# Patient Record
Sex: FEMALE | Race: WHITE | NOT HISPANIC OR LATINO | URBAN - METROPOLITAN AREA
[De-identification: names, ages, dates, MRNs, and addresses within clinical notes are randomized per-mention and may not be internally consistent; named-entity substitution may affect disease eponyms.]

---

## 2017-01-03 ENCOUNTER — INPATIENT (INPATIENT)
Facility: HOSPITAL | Age: 27
LOS: 1 days | Discharge: HOME | DRG: 897 | End: 2017-01-05
Attending: HOSPITALIST | Admitting: INTERNAL MEDICINE
Payer: COMMERCIAL

## 2017-01-03 VITALS
SYSTOLIC BLOOD PRESSURE: 156 MMHG | OXYGEN SATURATION: 100 % | HEIGHT: 67 IN | WEIGHT: 139.99 LBS | RESPIRATION RATE: 18 BRPM | DIASTOLIC BLOOD PRESSURE: 98 MMHG | TEMPERATURE: 98 F | HEART RATE: 111 BPM

## 2017-01-03 DIAGNOSIS — F10.239 ALCOHOL DEPENDENCE WITH WITHDRAWAL, UNSPECIFIED: ICD-10-CM

## 2017-01-03 LAB
ALBUMIN SERPL ELPH-MCNC: 4.8 G/DL — SIGNIFICANT CHANGE UP (ref 3.3–5)
ALP SERPL-CCNC: 87 U/L — SIGNIFICANT CHANGE UP (ref 40–120)
ALT FLD-CCNC: 88 U/L RC — HIGH (ref 10–45)
ANION GAP SERPL CALC-SCNC: 20 MMOL/L — HIGH (ref 5–17)
APPEARANCE UR: ABNORMAL
APTT BLD: 32.4 SEC — SIGNIFICANT CHANGE UP (ref 27.5–37.4)
AST SERPL-CCNC: 122 U/L — HIGH (ref 10–40)
BASOPHILS # BLD AUTO: 0 K/UL — SIGNIFICANT CHANGE UP (ref 0–0.2)
BASOPHILS NFR BLD AUTO: 0.7 % — SIGNIFICANT CHANGE UP (ref 0–2)
BILIRUB SERPL-MCNC: 0.5 MG/DL — SIGNIFICANT CHANGE UP (ref 0.2–1.2)
BILIRUB UR-MCNC: NEGATIVE — SIGNIFICANT CHANGE UP
BUN SERPL-MCNC: 7 MG/DL — SIGNIFICANT CHANGE UP (ref 7–23)
CALCIUM SERPL-MCNC: 9.6 MG/DL — SIGNIFICANT CHANGE UP (ref 8.4–10.5)
CHLORIDE SERPL-SCNC: 99 MMOL/L — SIGNIFICANT CHANGE UP (ref 96–108)
CO2 SERPL-SCNC: 21 MMOL/L — LOW (ref 22–31)
COLOR SPEC: YELLOW — SIGNIFICANT CHANGE UP
CREAT SERPL-MCNC: 0.67 MG/DL — SIGNIFICANT CHANGE UP (ref 0.5–1.3)
DIFF PNL FLD: ABNORMAL
EOSINOPHIL # BLD AUTO: 0.3 K/UL — SIGNIFICANT CHANGE UP (ref 0–0.5)
EOSINOPHIL NFR BLD AUTO: 4.7 % — SIGNIFICANT CHANGE UP (ref 0–6)
EPI CELLS # UR: SIGNIFICANT CHANGE UP /HPF
GAS PNL BLDV: SIGNIFICANT CHANGE UP
GLUCOSE SERPL-MCNC: 82 MG/DL — SIGNIFICANT CHANGE UP (ref 70–99)
GLUCOSE UR QL: NEGATIVE — SIGNIFICANT CHANGE UP
HCG UR QL: NEGATIVE — SIGNIFICANT CHANGE UP
HCT VFR BLD CALC: 38.2 % — SIGNIFICANT CHANGE UP (ref 34.5–45)
HGB BLD-MCNC: 13.4 G/DL — SIGNIFICANT CHANGE UP (ref 11.5–15.5)
INR BLD: 0.99 RATIO — SIGNIFICANT CHANGE UP (ref 0.88–1.16)
KETONES UR-MCNC: ABNORMAL
LEUKOCYTE ESTERASE UR-ACNC: NEGATIVE — SIGNIFICANT CHANGE UP
LYMPHOCYTES # BLD AUTO: 1 K/UL — SIGNIFICANT CHANGE UP (ref 1–3.3)
LYMPHOCYTES # BLD AUTO: 14.3 % — SIGNIFICANT CHANGE UP (ref 13–44)
MCHC RBC-ENTMCNC: 34.5 PG — HIGH (ref 27–34)
MCHC RBC-ENTMCNC: 35.1 GM/DL — SIGNIFICANT CHANGE UP (ref 32–36)
MCV RBC AUTO: 98.2 FL — SIGNIFICANT CHANGE UP (ref 80–100)
MONOCYTES # BLD AUTO: 0.7 K/UL — SIGNIFICANT CHANGE UP (ref 0–0.9)
MONOCYTES NFR BLD AUTO: 10.2 % — SIGNIFICANT CHANGE UP (ref 2–14)
NEUTROPHILS # BLD AUTO: 4.7 K/UL — SIGNIFICANT CHANGE UP (ref 1.8–7.4)
NEUTROPHILS NFR BLD AUTO: 70.1 % — SIGNIFICANT CHANGE UP (ref 43–77)
NITRITE UR-MCNC: NEGATIVE — SIGNIFICANT CHANGE UP
PH UR: 7 — SIGNIFICANT CHANGE UP (ref 4.8–8)
PLATELET # BLD AUTO: 272 K/UL — SIGNIFICANT CHANGE UP (ref 150–400)
POTASSIUM SERPL-MCNC: 3.8 MMOL/L — SIGNIFICANT CHANGE UP (ref 3.5–5.3)
POTASSIUM SERPL-SCNC: 3.8 MMOL/L — SIGNIFICANT CHANGE UP (ref 3.5–5.3)
PROT SERPL-MCNC: 7.6 G/DL — SIGNIFICANT CHANGE UP (ref 6–8.3)
PROT UR-MCNC: 30 MG/DL
PROTHROM AB SERPL-ACNC: 10.7 SEC — SIGNIFICANT CHANGE UP (ref 10–13.1)
RBC # BLD: 3.89 M/UL — SIGNIFICANT CHANGE UP (ref 3.8–5.2)
RBC # FLD: 11.5 % — SIGNIFICANT CHANGE UP (ref 10.3–14.5)
RBC CASTS # UR COMP ASSIST: ABNORMAL /HPF (ref 0–2)
SODIUM SERPL-SCNC: 140 MMOL/L — SIGNIFICANT CHANGE UP (ref 135–145)
SP GR SPEC: >1.03 — HIGH (ref 1.01–1.02)
UROBILINOGEN FLD QL: NEGATIVE — SIGNIFICANT CHANGE UP
WBC # BLD: 6.7 K/UL — SIGNIFICANT CHANGE UP (ref 3.8–10.5)
WBC # FLD AUTO: 6.7 K/UL — SIGNIFICANT CHANGE UP (ref 3.8–10.5)
WBC UR QL: SIGNIFICANT CHANGE UP /HPF (ref 0–5)

## 2017-01-03 PROCEDURE — 99223 1ST HOSP IP/OBS HIGH 75: CPT

## 2017-01-03 PROCEDURE — 93010 ELECTROCARDIOGRAM REPORT: CPT

## 2017-01-03 PROCEDURE — 70450 CT HEAD/BRAIN W/O DYE: CPT | Mod: 26

## 2017-01-03 PROCEDURE — 99285 EMERGENCY DEPT VISIT HI MDM: CPT | Mod: 25

## 2017-01-03 PROCEDURE — 70487 CT MAXILLOFACIAL W/DYE: CPT | Mod: 26

## 2017-01-03 RX ORDER — SODIUM CHLORIDE 9 MG/ML
2000 INJECTION INTRAMUSCULAR; INTRAVENOUS; SUBCUTANEOUS ONCE
Qty: 0 | Refills: 0 | Status: COMPLETED | OUTPATIENT
Start: 2017-01-03 | End: 2017-01-03

## 2017-01-03 RX ADMIN — SODIUM CHLORIDE 1000 MILLILITER(S): 9 INJECTION INTRAMUSCULAR; INTRAVENOUS; SUBCUTANEOUS at 18:41

## 2017-01-03 RX ADMIN — Medication 50 MILLIGRAM(S): at 22:38

## 2017-01-03 RX ADMIN — Medication 1 MILLIGRAM(S): at 18:40
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
## 2017-01-03 NOTE — ED PROVIDER NOTE - HEAD, MLM
Bilaterally periorbital ecchymosis with mild maxillary TTP. Head shape is symmetrical. Bilaterally periorbital ecchymosis with mild maxillary TTP. No facial crepitus or obvious deformity. Head shape is symmetrical.

## 2017-01-03 NOTE — ED PROVIDER NOTE - NEUROLOGICAL, MLM
Alert and oriented, no focal deficits, no motor or sensory deficits. + Resting tremors with hands extended.

## 2017-01-03 NOTE — ED PROVIDER NOTE - PROGRESS NOTE DETAILS
Patient with DECLAN, will admit to prevent seizures, CT Head / Neck unremarkable to need interventions.  - Tab Castillo MD

## 2017-01-03 NOTE — ED PROVIDER NOTE - INTERPRETATION
normal sinus rhythm, Normal axis, Normal NM interval and QRS complex. There are no acute ischemic ST or T-wave changes.

## 2017-01-03 NOTE — ED PROVIDER NOTE - PSYCHIATRIC, MLM
Alert and oriented to person, place, time/situation. Mildly anxious. no apparent risk to self or others.
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
## 2017-01-03 NOTE — ED PROVIDER NOTE - SKIN, MLM
Bilaterally periorbital ecchymosis, otherwise Skin normal color for race, warm, dry and intact. No evidence of rash.

## 2017-01-04 DIAGNOSIS — F32.9 MAJOR DEPRESSIVE DISORDER, SINGLE EPISODE, UNSPECIFIED: ICD-10-CM

## 2017-01-04 DIAGNOSIS — E87.2 ACIDOSIS: ICD-10-CM

## 2017-01-04 DIAGNOSIS — Z29.9 ENCOUNTER FOR PROPHYLACTIC MEASURES, UNSPECIFIED: ICD-10-CM

## 2017-01-04 DIAGNOSIS — R63.8 OTHER SYMPTOMS AND SIGNS CONCERNING FOOD AND FLUID INTAKE: ICD-10-CM

## 2017-01-04 DIAGNOSIS — F10.239 ALCOHOL DEPENDENCE WITH WITHDRAWAL, UNSPECIFIED: ICD-10-CM

## 2017-01-04 DIAGNOSIS — F19.10 OTHER PSYCHOACTIVE SUBSTANCE ABUSE, UNCOMPLICATED: ICD-10-CM

## 2017-01-04 DIAGNOSIS — Z72.0 TOBACCO USE: ICD-10-CM

## 2017-01-04 LAB
ANION GAP SERPL CALC-SCNC: 12 MMOL/L — SIGNIFICANT CHANGE UP (ref 5–17)
APPEARANCE UR: CLEAR — SIGNIFICANT CHANGE UP
BILIRUB UR-MCNC: NEGATIVE — SIGNIFICANT CHANGE UP
BUN SERPL-MCNC: 5 MG/DL — LOW (ref 7–23)
CALCIUM SERPL-MCNC: 8.1 MG/DL — LOW (ref 8.4–10.5)
CHLORIDE SERPL-SCNC: 104 MMOL/L — SIGNIFICANT CHANGE UP (ref 96–108)
CO2 SERPL-SCNC: 22 MMOL/L — SIGNIFICANT CHANGE UP (ref 22–31)
COLOR SPEC: YELLOW — SIGNIFICANT CHANGE UP
CREAT SERPL-MCNC: 0.53 MG/DL — SIGNIFICANT CHANGE UP (ref 0.5–1.3)
DIFF PNL FLD: ABNORMAL
ETHANOL SERPL-MCNC: SIGNIFICANT CHANGE UP MG/DL (ref 0–10)
FOLATE SERPL-MCNC: 19.2 NG/ML — SIGNIFICANT CHANGE UP (ref 4.8–24.2)
GLUCOSE SERPL-MCNC: 82 MG/DL — SIGNIFICANT CHANGE UP (ref 70–99)
GLUCOSE UR QL: NEGATIVE MG/DL — SIGNIFICANT CHANGE UP
HCT VFR BLD CALC: 39.9 % — SIGNIFICANT CHANGE UP (ref 34.5–45)
HGB BLD-MCNC: 13.1 G/DL — SIGNIFICANT CHANGE UP (ref 11.5–15.5)
KETONES UR-MCNC: ABNORMAL
LEUKOCYTE ESTERASE UR-ACNC: NEGATIVE — SIGNIFICANT CHANGE UP
MAGNESIUM SERPL-MCNC: 1.7 MG/DL — SIGNIFICANT CHANGE UP (ref 1.6–2.6)
MCHC RBC-ENTMCNC: 32.5 PG — SIGNIFICANT CHANGE UP (ref 27–34)
MCHC RBC-ENTMCNC: 32.8 GM/DL — SIGNIFICANT CHANGE UP (ref 32–36)
MCV RBC AUTO: 99 FL — SIGNIFICANT CHANGE UP (ref 80–100)
NITRITE UR-MCNC: NEGATIVE — SIGNIFICANT CHANGE UP
PH UR: 6.5 — SIGNIFICANT CHANGE UP (ref 5–8)
PHOSPHATE SERPL-MCNC: 2.1 MG/DL — LOW (ref 2.5–4.5)
PLATELET # BLD AUTO: 264 K/UL — SIGNIFICANT CHANGE UP (ref 150–400)
POTASSIUM SERPL-MCNC: 3.8 MMOL/L — SIGNIFICANT CHANGE UP (ref 3.5–5.3)
POTASSIUM SERPL-SCNC: 3.8 MMOL/L — SIGNIFICANT CHANGE UP (ref 3.5–5.3)
PROT UR-MCNC: NEGATIVE MG/DL — SIGNIFICANT CHANGE UP
RBC # BLD: 4.03 M/UL — SIGNIFICANT CHANGE UP (ref 3.8–5.2)
RBC # FLD: 11.8 % — SIGNIFICANT CHANGE UP (ref 10.3–14.5)
SODIUM SERPL-SCNC: 138 MMOL/L — SIGNIFICANT CHANGE UP (ref 135–145)
SP GR SPEC: 1.01 — LOW (ref 1.01–1.02)
T PALLIDUM AB TITR SER: NEGATIVE — SIGNIFICANT CHANGE UP
TSH SERPL-MCNC: 1.22 UU/ML — SIGNIFICANT CHANGE UP (ref 0.27–4.2)
UROBILINOGEN FLD QL: NEGATIVE MG/DL — SIGNIFICANT CHANGE UP
VIT B12 SERPL-MCNC: 606 PG/ML — SIGNIFICANT CHANGE UP (ref 243–894)
WBC # BLD: 6 K/UL — SIGNIFICANT CHANGE UP (ref 3.8–10.5)
WBC # FLD AUTO: 6 K/UL — SIGNIFICANT CHANGE UP (ref 3.8–10.5)

## 2017-01-04 PROCEDURE — 99233 SBSQ HOSP IP/OBS HIGH 50: CPT

## 2017-01-04 PROCEDURE — 99222 1ST HOSP IP/OBS MODERATE 55: CPT | Mod: GC

## 2017-01-04 RX ORDER — SODIUM CHLORIDE 9 MG/ML
1000 INJECTION INTRAMUSCULAR; INTRAVENOUS; SUBCUTANEOUS
Qty: 0 | Refills: 0 | Status: DISCONTINUED | OUTPATIENT
Start: 2017-01-04 | End: 2017-01-05

## 2017-01-04 RX ORDER — TRAZODONE HCL 50 MG
50 TABLET ORAL AT BEDTIME
Qty: 0 | Refills: 0 | Status: DISCONTINUED | OUTPATIENT
Start: 2017-01-04 | End: 2017-01-05

## 2017-01-04 RX ORDER — HEPARIN SODIUM 5000 [USP'U]/ML
5000 INJECTION INTRAVENOUS; SUBCUTANEOUS EVERY 12 HOURS
Qty: 0 | Refills: 0 | Status: DISCONTINUED | OUTPATIENT
Start: 2017-01-04 | End: 2017-01-05

## 2017-01-04 RX ORDER — SODIUM,POTASSIUM PHOSPHATES 278-250MG
1 POWDER IN PACKET (EA) ORAL
Qty: 0 | Refills: 0 | Status: DISCONTINUED | OUTPATIENT
Start: 2017-01-04 | End: 2017-01-05

## 2017-01-04 RX ORDER — FOLIC ACID 0.8 MG
1 TABLET ORAL DAILY
Qty: 0 | Refills: 0 | Status: DISCONTINUED | OUTPATIENT
Start: 2017-01-04 | End: 2017-01-05

## 2017-01-04 RX ORDER — NICOTINE POLACRILEX 2 MG
1 GUM BUCCAL DAILY
Qty: 0 | Refills: 0 | Status: DISCONTINUED | OUTPATIENT
Start: 2017-01-04 | End: 2017-01-05

## 2017-01-04 RX ORDER — SERTRALINE 25 MG/1
50 TABLET, FILM COATED ORAL DAILY
Qty: 0 | Refills: 0 | Status: DISCONTINUED | OUTPATIENT
Start: 2017-01-04 | End: 2017-01-05

## 2017-01-04 RX ORDER — THIAMINE MONONITRATE (VIT B1) 100 MG
100 TABLET ORAL DAILY
Qty: 0 | Refills: 0 | Status: DISCONTINUED | OUTPATIENT
Start: 2017-01-04 | End: 2017-01-05

## 2017-01-04 RX ADMIN — SODIUM CHLORIDE 150 MILLILITER(S): 9 INJECTION INTRAMUSCULAR; INTRAVENOUS; SUBCUTANEOUS at 12:43

## 2017-01-04 RX ADMIN — Medication 1 TABLET(S): at 11:57

## 2017-01-04 RX ADMIN — Medication 1 PATCH: at 11:58

## 2017-01-04 RX ADMIN — Medication 1 TABLET(S): at 21:30

## 2017-01-04 RX ADMIN — Medication 1 TABLET(S): at 11:58

## 2017-01-04 RX ADMIN — SERTRALINE 50 MILLIGRAM(S): 25 TABLET, FILM COATED ORAL at 12:02

## 2017-01-04 RX ADMIN — Medication 1 TABLET(S): at 19:15

## 2017-01-04 RX ADMIN — Medication 1 MILLIGRAM(S): at 11:57

## 2017-01-04 RX ADMIN — SODIUM CHLORIDE 150 MILLILITER(S): 9 INJECTION INTRAMUSCULAR; INTRAVENOUS; SUBCUTANEOUS at 04:11

## 2017-01-04 RX ADMIN — Medication 100 MILLIGRAM(S): at 11:57

## 2017-01-04 RX ADMIN — Medication 50 MILLIGRAM(S): at 21:30

## 2017-01-04 RX ADMIN — Medication 1 MILLIGRAM(S): at 21:20

## 2017-01-04 NOTE — ED ADULT NURSE REASSESSMENT NOTE - TREMOR
4=moderate with patient's arms extended

## 2017-01-04 NOTE — ED ADULT NURSE REASSESSMENT NOTE - NAUSEA/VOMITING
0=no nausea with no vomiting

## 2017-01-04 NOTE — ED ADULT NURSE REASSESSMENT NOTE - ORIENTATION
0=oriented and can do serial additions

## 2017-01-04 NOTE — H&P ADULT. - ASSESSMENT
This is a 26 year old woman with history of depression, anxiety, EtOH abuse and polysubstance abuse (cocaine, benzos) presenting with EtOH withdrawal symptoms
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
## 2017-01-04 NOTE — H&P ADULT. - PROBLEM SELECTOR PLAN 1
--initiate low risk CIWA protocol with symptoms triggered IV ativan  --currently 72 hours since last use, nearing end of window for concern  --patient endorses a desire to enter rehab, though currently prefers to go home first and arrange rehab from there. Addressed benefits of going straight to rehab from hospital  --continue IV hydration  --SW c/s

## 2017-01-04 NOTE — ED ADULT NURSE REASSESSMENT NOTE - NS ED NURSE REASSESS COMMENT FT1
Pts VS stable and she is in no acute distress. Pt denies pain just reports trouble sleeping in the halls, she was given warm blankets and a pillow
Pts VS stable and she is in no acute distress. Pt is resting comfortably
Pts VS stable and she is in no acute distress, Pt given food and she is tolerating PO

## 2017-01-04 NOTE — H&P ADULT. - FAMILY HISTORY
Mother  Still living? Unknown  Family history of MS (multiple sclerosis), Age at diagnosis: Age Unknown     Sibling  Still living? Unknown  Family history of MS (multiple sclerosis), Age at diagnosis: Age Unknown

## 2017-01-05 ENCOUNTER — TRANSCRIPTION ENCOUNTER (OUTPATIENT)
Age: 27
End: 2017-01-05

## 2017-01-05 VITALS
HEART RATE: 76 BPM | TEMPERATURE: 99 F | OXYGEN SATURATION: 98 % | SYSTOLIC BLOOD PRESSURE: 126 MMHG | DIASTOLIC BLOOD PRESSURE: 88 MMHG | RESPIRATION RATE: 16 BRPM

## 2017-01-05 LAB
ALBUMIN SERPL ELPH-MCNC: 3.5 G/DL — SIGNIFICANT CHANGE UP (ref 3.3–5)
ALP SERPL-CCNC: 61 U/L — SIGNIFICANT CHANGE UP (ref 40–120)
ALT FLD-CCNC: 70 U/L — HIGH (ref 10–45)
ANION GAP SERPL CALC-SCNC: 14 MMOL/L — SIGNIFICANT CHANGE UP (ref 5–17)
AST SERPL-CCNC: 87 U/L — HIGH (ref 10–40)
BILIRUB SERPL-MCNC: 0.4 MG/DL — SIGNIFICANT CHANGE UP (ref 0.2–1.2)
BUN SERPL-MCNC: 4 MG/DL — LOW (ref 7–23)
CALCIUM SERPL-MCNC: 8.2 MG/DL — LOW (ref 8.4–10.5)
CHLORIDE SERPL-SCNC: 105 MMOL/L — SIGNIFICANT CHANGE UP (ref 96–108)
CO2 SERPL-SCNC: 19 MMOL/L — LOW (ref 22–31)
CREAT SERPL-MCNC: 0.48 MG/DL — LOW (ref 0.5–1.3)
CULTURE RESULTS: SIGNIFICANT CHANGE UP
GLUCOSE SERPL-MCNC: 80 MG/DL — SIGNIFICANT CHANGE UP (ref 70–99)
HCT VFR BLD CALC: 34.6 % — SIGNIFICANT CHANGE UP (ref 34.5–45)
HGB BLD-MCNC: 11.2 G/DL — LOW (ref 11.5–15.5)
MAGNESIUM SERPL-MCNC: 1.6 MG/DL — SIGNIFICANT CHANGE UP (ref 1.6–2.6)
MCHC RBC-ENTMCNC: 31.8 PG — SIGNIFICANT CHANGE UP (ref 27–34)
MCHC RBC-ENTMCNC: 32.4 GM/DL — SIGNIFICANT CHANGE UP (ref 32–36)
MCV RBC AUTO: 98.3 FL — SIGNIFICANT CHANGE UP (ref 80–100)
PHOSPHATE SERPL-MCNC: 3.2 MG/DL — SIGNIFICANT CHANGE UP (ref 2.5–4.5)
PLATELET # BLD AUTO: 246 K/UL — SIGNIFICANT CHANGE UP (ref 150–400)
POTASSIUM SERPL-MCNC: 3.5 MMOL/L — SIGNIFICANT CHANGE UP (ref 3.5–5.3)
POTASSIUM SERPL-SCNC: 3.5 MMOL/L — SIGNIFICANT CHANGE UP (ref 3.5–5.3)
PROT SERPL-MCNC: 5.7 G/DL — LOW (ref 6–8.3)
RBC # BLD: 3.52 M/UL — LOW (ref 3.8–5.2)
RBC # FLD: 12.8 % — SIGNIFICANT CHANGE UP (ref 10.3–14.5)
SODIUM SERPL-SCNC: 138 MMOL/L — SIGNIFICANT CHANGE UP (ref 135–145)
SPECIMEN SOURCE: SIGNIFICANT CHANGE UP
WBC # BLD: 6.58 K/UL — SIGNIFICANT CHANGE UP (ref 3.8–10.5)
WBC # FLD AUTO: 6.58 K/UL — SIGNIFICANT CHANGE UP (ref 3.8–10.5)

## 2017-01-05 PROCEDURE — 83605 ASSAY OF LACTIC ACID: CPT

## 2017-01-05 PROCEDURE — 81025 URINE PREGNANCY TEST: CPT

## 2017-01-05 PROCEDURE — 85730 THROMBOPLASTIN TIME PARTIAL: CPT

## 2017-01-05 PROCEDURE — 80053 COMPREHEN METABOLIC PANEL: CPT

## 2017-01-05 PROCEDURE — 86780 TREPONEMA PALLIDUM: CPT

## 2017-01-05 PROCEDURE — 84132 ASSAY OF SERUM POTASSIUM: CPT

## 2017-01-05 PROCEDURE — 83735 ASSAY OF MAGNESIUM: CPT

## 2017-01-05 PROCEDURE — 70487 CT MAXILLOFACIAL W/DYE: CPT

## 2017-01-05 PROCEDURE — 99239 HOSP IP/OBS DSCHRG MGMT >30: CPT

## 2017-01-05 PROCEDURE — 96374 THER/PROPH/DIAG INJ IV PUSH: CPT | Mod: XU

## 2017-01-05 PROCEDURE — 85610 PROTHROMBIN TIME: CPT

## 2017-01-05 PROCEDURE — 99285 EMERGENCY DEPT VISIT HI MDM: CPT | Mod: 25

## 2017-01-05 PROCEDURE — 82607 VITAMIN B-12: CPT

## 2017-01-05 PROCEDURE — 70450 CT HEAD/BRAIN W/O DYE: CPT

## 2017-01-05 PROCEDURE — 84100 ASSAY OF PHOSPHORUS: CPT

## 2017-01-05 PROCEDURE — 81001 URINALYSIS AUTO W/SCOPE: CPT

## 2017-01-05 PROCEDURE — 99232 SBSQ HOSP IP/OBS MODERATE 35: CPT | Mod: GC

## 2017-01-05 PROCEDURE — 80307 DRUG TEST PRSMV CHEM ANLYZR: CPT

## 2017-01-05 PROCEDURE — 82803 BLOOD GASES ANY COMBINATION: CPT

## 2017-01-05 PROCEDURE — 82947 ASSAY GLUCOSE BLOOD QUANT: CPT

## 2017-01-05 PROCEDURE — 90686 IIV4 VACC NO PRSV 0.5 ML IM: CPT

## 2017-01-05 PROCEDURE — 82330 ASSAY OF CALCIUM: CPT

## 2017-01-05 PROCEDURE — 87086 URINE CULTURE/COLONY COUNT: CPT

## 2017-01-05 PROCEDURE — 85014 HEMATOCRIT: CPT

## 2017-01-05 PROCEDURE — 84295 ASSAY OF SERUM SODIUM: CPT

## 2017-01-05 PROCEDURE — 82746 ASSAY OF FOLIC ACID SERUM: CPT

## 2017-01-05 PROCEDURE — 93005 ELECTROCARDIOGRAM TRACING: CPT

## 2017-01-05 PROCEDURE — 82435 ASSAY OF BLOOD CHLORIDE: CPT

## 2017-01-05 PROCEDURE — 85027 COMPLETE CBC AUTOMATED: CPT

## 2017-01-05 PROCEDURE — 84443 ASSAY THYROID STIM HORMONE: CPT

## 2017-01-05 PROCEDURE — 80048 BASIC METABOLIC PNL TOTAL CA: CPT

## 2017-01-05 RX ORDER — FOLIC ACID 0.8 MG
1 TABLET ORAL
Qty: 0 | Refills: 0 | COMMUNITY
Start: 2017-01-05

## 2017-01-05 RX ORDER — TRAZODONE HCL 50 MG
1 TABLET ORAL
Qty: 30 | Refills: 0 | OUTPATIENT
Start: 2017-01-05 | End: 2017-02-04

## 2017-01-05 RX ORDER — MAGNESIUM SULFATE 500 MG/ML
2 VIAL (ML) INJECTION ONCE
Qty: 0 | Refills: 0 | Status: COMPLETED | OUTPATIENT
Start: 2017-01-05 | End: 2017-01-05

## 2017-01-05 RX ORDER — NICOTINE POLACRILEX 2 MG
1 GUM BUCCAL
Qty: 30 | Refills: 0 | OUTPATIENT
Start: 2017-01-05 | End: 2017-02-04

## 2017-01-05 RX ORDER — THIAMINE MONONITRATE (VIT B1) 100 MG
1 TABLET ORAL
Qty: 0 | Refills: 0 | COMMUNITY
Start: 2017-01-05

## 2017-01-05 RX ORDER — INFLUENZA VIRUS VACCINE 15; 15; 15; 15 UG/.5ML; UG/.5ML; UG/.5ML; UG/.5ML
0.5 SUSPENSION INTRAMUSCULAR ONCE
Qty: 0 | Refills: 0 | Status: COMPLETED | OUTPATIENT
Start: 2017-01-05 | End: 2017-01-05

## 2017-01-05 RX ADMIN — SODIUM CHLORIDE 150 MILLILITER(S): 9 INJECTION INTRAMUSCULAR; INTRAVENOUS; SUBCUTANEOUS at 06:57

## 2017-01-05 RX ADMIN — Medication 1 PATCH: at 12:17

## 2017-01-05 RX ADMIN — SODIUM CHLORIDE 150 MILLILITER(S): 9 INJECTION INTRAMUSCULAR; INTRAVENOUS; SUBCUTANEOUS at 08:03

## 2017-01-05 RX ADMIN — SERTRALINE 50 MILLIGRAM(S): 25 TABLET, FILM COATED ORAL at 13:52

## 2017-01-05 RX ADMIN — Medication 100 MILLIGRAM(S): at 12:12

## 2017-01-05 RX ADMIN — Medication 1 PATCH: at 12:14

## 2017-01-05 RX ADMIN — Medication 1 TABLET(S): at 16:35

## 2017-01-05 RX ADMIN — HEPARIN SODIUM 5000 UNIT(S): 5000 INJECTION INTRAVENOUS; SUBCUTANEOUS at 06:12

## 2017-01-05 RX ADMIN — INFLUENZA VIRUS VACCINE 0.5 MILLILITER(S): 15; 15; 15; 15 SUSPENSION INTRAMUSCULAR at 16:32

## 2017-01-05 RX ADMIN — Medication 1 MILLIGRAM(S): at 12:12

## 2017-01-05 RX ADMIN — Medication 1 TABLET(S): at 12:12

## 2017-01-05 RX ADMIN — Medication 1 TABLET(S): at 12:11

## 2017-01-05 RX ADMIN — Medication 1 TABLET(S): at 09:03

## 2017-01-05 RX ADMIN — Medication 50 GRAM(S): at 13:52

## 2017-01-05 NOTE — DISCHARGE NOTE ADULT - PLAN OF CARE
symptoms are resolving You were seen by social work and given a handout with inpatient and outpatient rehabilitation facilities.  Please make an attempt to call and set up an appointment with any of the appropriate facilities of your choice. Follow up with outpatient psychiatry clinic and outpatient medicine clinic within a week of being discharged from the hospital.   SEEK IMMEDIATE CARE IF  You have thoughts about hurting yourself or others.  You lose touch with reality (have psychotic symptoms).  You are taking medicine for depression and have a serious side effect
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
## 2017-01-05 NOTE — DISCHARGE NOTE ADULT - MEDICATION SUMMARY - MEDICATIONS TO TAKE
I will START or STAY ON the medications listed below when I get home from the hospital:    traZODone 50 mg oral tablet  -- 1 tab(s) by mouth once a day (at bedtime)  -- Indication: For Depression    Zoloft 50 mg oral tablet  -- 1 tab(s) by mouth once a day  -- Indication: For Depression    nicotine 7 mg/24 hr transdermal film, extended release  -- 1 patch by transdermal patch once a day  -- Indication: For smoking cessation     Multiple Vitamins oral tablet  -- 1 tab(s) by mouth once a day  -- Indication: For supplement     folic acid 1 mg oral tablet  -- 1 tab(s) by mouth once a day  -- Indication: For  Alcohol withdrawal supplement    thiamine 100 mg oral tablet  -- 1 tab(s) by mouth once a day  -- Indication: For Alcohol withdrawal supplement     Vitamin D3 2000 intl units oral tablet  -- 1 tab(s) by mouth once a day  -- Indication: For supplement

## 2017-01-05 NOTE — DISCHARGE NOTE ADULT - CARE PLAN
Principal Discharge DX:	Alcohol withdrawal syndrome, with unspecified complication  Goal:	symptoms are resolving  Instructions for follow-up, activity and diet:	You were seen by social work and given a handout with inpatient and outpatient rehabilitation facilities.  Please make an attempt to call and set up an appointment with any of the appropriate facilities of your choice.  Secondary Diagnosis:	Depression  Instructions for follow-up, activity and diet:	Follow up with outpatient psychiatry clinic and outpatient medicine clinic within a week of being discharged from the hospital.   SEEK IMMEDIATE CARE IF  You have thoughts about hurting yourself or others.  You lose touch with reality (have psychotic symptoms).  You are taking medicine for depression and have a serious side effect  Secondary Diagnosis:	Polysubstance abuse  Instructions for follow-up, activity and diet:	You were seen by social work and given a handout with inpatient and outpatient rehabilitation facilities.  Please make an attempt to call and set up an appointment with any of the appropriate facilities of your choice.

## 2017-01-05 NOTE — DISCHARGE NOTE ADULT - CARE PROVIDER_API CALL
Christiane Blake; MPH), Internal Medicine  [address] [place] [id number]  Phone: [phone]  Fax: [phone]    Abrahan Wilson (MD; [facility]), Psychiatry; Psychosomatic Medicine  [address] [place] [id number]  Phone: [phone]  Fax: [phone]

## 2017-01-05 NOTE — DISCHARGE NOTE ADULT - PATIENT PORTAL LINK FT
“You can access the FollowHealth Patient Portal, offered by [facility], by registering with the following website: [email or web address]”

## 2017-01-09 DIAGNOSIS — F41.8 OTHER SPECIFIED ANXIETY DISORDERS: ICD-10-CM

## 2017-01-09 DIAGNOSIS — F10.239 ALCOHOL DEPENDENCE WITH WITHDRAWAL, UNSPECIFIED: ICD-10-CM

## 2017-01-09 DIAGNOSIS — F19.10 OTHER PSYCHOACTIVE SUBSTANCE ABUSE, UNCOMPLICATED: ICD-10-CM

## 2017-03-03 ENCOUNTER — INPATIENT (INPATIENT)
Facility: HOSPITAL | Age: 27
LOS: 0 days | Discharge: HOME | DRG: 897 | End: 2017-03-04
Attending: INTERNAL MEDICINE | Admitting: INTERNAL MEDICINE
Payer: COMMERCIAL

## 2017-03-03 VITALS
RESPIRATION RATE: 22 BRPM | SYSTOLIC BLOOD PRESSURE: 143 MMHG | DIASTOLIC BLOOD PRESSURE: 107 MMHG | HEART RATE: 98 BPM | OXYGEN SATURATION: 99 % | TEMPERATURE: 99 F

## 2017-03-03 DIAGNOSIS — Z41.8 ENCOUNTER FOR OTHER PROCEDURES FOR PURPOSES OTHER THAN REMEDYING HEALTH STATE: ICD-10-CM

## 2017-03-03 DIAGNOSIS — F32.9 MAJOR DEPRESSIVE DISORDER, SINGLE EPISODE, UNSPECIFIED: ICD-10-CM

## 2017-03-03 DIAGNOSIS — F10.239 ALCOHOL DEPENDENCE WITH WITHDRAWAL, UNSPECIFIED: ICD-10-CM

## 2017-03-03 DIAGNOSIS — G47.00 INSOMNIA, UNSPECIFIED: ICD-10-CM

## 2017-03-03 PROBLEM — F41.9 ANXIETY DISORDER, UNSPECIFIED: Chronic | Status: ACTIVE | Noted: 2017-01-03

## 2017-03-03 LAB
ALBUMIN SERPL ELPH-MCNC: 4.8 G/DL — SIGNIFICANT CHANGE UP (ref 3.3–5)
ALP SERPL-CCNC: 81 U/L — SIGNIFICANT CHANGE UP (ref 40–120)
ALT FLD-CCNC: 108 U/L RC — HIGH (ref 10–45)
ANION GAP SERPL CALC-SCNC: 16 MMOL/L — SIGNIFICANT CHANGE UP (ref 5–17)
APPEARANCE UR: ABNORMAL
AST SERPL-CCNC: 148 U/L — HIGH (ref 10–40)
BASE EXCESS BLDV CALC-SCNC: 2.6 MMOL/L — HIGH (ref -2–2)
BASOPHILS # BLD AUTO: 0 K/UL — SIGNIFICANT CHANGE UP (ref 0–0.2)
BASOPHILS NFR BLD AUTO: 0.7 % — SIGNIFICANT CHANGE UP (ref 0–2)
BILIRUB SERPL-MCNC: 0.5 MG/DL — SIGNIFICANT CHANGE UP (ref 0.2–1.2)
BILIRUB UR-MCNC: NEGATIVE — SIGNIFICANT CHANGE UP
BUN SERPL-MCNC: 8 MG/DL — SIGNIFICANT CHANGE UP (ref 7–23)
CALCIUM SERPL-MCNC: 9.5 MG/DL — SIGNIFICANT CHANGE UP (ref 8.4–10.5)
CHLORIDE SERPL-SCNC: 97 MMOL/L — SIGNIFICANT CHANGE UP (ref 96–108)
CO2 BLDV-SCNC: 29 MMOL/L — SIGNIFICANT CHANGE UP (ref 22–30)
CO2 SERPL-SCNC: 24 MMOL/L — SIGNIFICANT CHANGE UP (ref 22–31)
COLOR SPEC: SIGNIFICANT CHANGE UP
CREAT SERPL-MCNC: 0.54 MG/DL — SIGNIFICANT CHANGE UP (ref 0.5–1.3)
DIFF PNL FLD: ABNORMAL
EOSINOPHIL # BLD AUTO: 0.1 K/UL — SIGNIFICANT CHANGE UP (ref 0–0.5)
EOSINOPHIL NFR BLD AUTO: 2.6 % — SIGNIFICANT CHANGE UP (ref 0–6)
ETHANOL SERPL-MCNC: 178 MG/DL — HIGH (ref 0–10)
GLUCOSE SERPL-MCNC: 86 MG/DL — SIGNIFICANT CHANGE UP (ref 70–99)
GLUCOSE UR QL: NEGATIVE — SIGNIFICANT CHANGE UP
HCO3 BLDV-SCNC: 28 MMOL/L — SIGNIFICANT CHANGE UP (ref 21–29)
HCT VFR BLD CALC: 38.8 % — SIGNIFICANT CHANGE UP (ref 34.5–45)
HGB BLD-MCNC: 13.4 G/DL — SIGNIFICANT CHANGE UP (ref 11.5–15.5)
KETONES UR-MCNC: NEGATIVE — SIGNIFICANT CHANGE UP
LEUKOCYTE ESTERASE UR-ACNC: NEGATIVE — SIGNIFICANT CHANGE UP
LYMPHOCYTES # BLD AUTO: 0.8 K/UL — LOW (ref 1–3.3)
LYMPHOCYTES # BLD AUTO: 16.6 % — SIGNIFICANT CHANGE UP (ref 13–44)
MCHC RBC-ENTMCNC: 33.1 PG — SIGNIFICANT CHANGE UP (ref 27–34)
MCHC RBC-ENTMCNC: 34.7 GM/DL — SIGNIFICANT CHANGE UP (ref 32–36)
MCV RBC AUTO: 95.6 FL — SIGNIFICANT CHANGE UP (ref 80–100)
MONOCYTES # BLD AUTO: 0.5 K/UL — SIGNIFICANT CHANGE UP (ref 0–0.9)
MONOCYTES NFR BLD AUTO: 9.3 % — SIGNIFICANT CHANGE UP (ref 2–14)
NEUTROPHILS # BLD AUTO: 3.5 K/UL — SIGNIFICANT CHANGE UP (ref 1.8–7.4)
NEUTROPHILS NFR BLD AUTO: 70.9 % — SIGNIFICANT CHANGE UP (ref 43–77)
NITRITE UR-MCNC: NEGATIVE — SIGNIFICANT CHANGE UP
PCO2 BLDV: 46 MMHG — SIGNIFICANT CHANGE UP (ref 35–50)
PH BLDV: 7.39 — SIGNIFICANT CHANGE UP (ref 7.35–7.45)
PH UR: 7 — SIGNIFICANT CHANGE UP (ref 4.8–8)
PLATELET # BLD AUTO: 225 K/UL — SIGNIFICANT CHANGE UP (ref 150–400)
PO2 BLDV: 30 MMHG — SIGNIFICANT CHANGE UP (ref 25–45)
POTASSIUM SERPL-MCNC: 5.2 MMOL/L — SIGNIFICANT CHANGE UP (ref 3.5–5.3)
POTASSIUM SERPL-SCNC: 5.2 MMOL/L — SIGNIFICANT CHANGE UP (ref 3.5–5.3)
PROT SERPL-MCNC: 7.8 G/DL — SIGNIFICANT CHANGE UP (ref 6–8.3)
PROT UR-MCNC: SIGNIFICANT CHANGE UP
RBC # BLD: 4.05 M/UL — SIGNIFICANT CHANGE UP (ref 3.8–5.2)
RBC # FLD: 12.2 % — SIGNIFICANT CHANGE UP (ref 10.3–14.5)
SALICYLATES SERPL-MCNC: <2 MG/DL — LOW (ref 15–30)
SAO2 % BLDV: 51 % — LOW (ref 67–88)
SODIUM SERPL-SCNC: 137 MMOL/L — SIGNIFICANT CHANGE UP (ref 135–145)
SP GR SPEC: 1.01 — LOW (ref 1.01–1.02)
UROBILINOGEN FLD QL: NEGATIVE — SIGNIFICANT CHANGE UP
WBC # BLD: 4.9 K/UL — SIGNIFICANT CHANGE UP (ref 3.8–10.5)
WBC # FLD AUTO: 4.9 K/UL — SIGNIFICANT CHANGE UP (ref 3.8–10.5)

## 2017-03-03 PROCEDURE — 99285 EMERGENCY DEPT VISIT HI MDM: CPT | Mod: 25

## 2017-03-03 PROCEDURE — 93010 ELECTROCARDIOGRAM REPORT: CPT

## 2017-03-03 PROCEDURE — 71020: CPT | Mod: 26

## 2017-03-03 PROCEDURE — 99223 1ST HOSP IP/OBS HIGH 75: CPT | Mod: GC

## 2017-03-03 RX ORDER — TRAZODONE HCL 50 MG
50 TABLET ORAL AT BEDTIME
Qty: 0 | Refills: 0 | Status: DISCONTINUED | OUTPATIENT
Start: 2017-03-03 | End: 2017-03-04

## 2017-03-03 RX ORDER — SODIUM CHLORIDE 9 MG/ML
3 INJECTION INTRAMUSCULAR; INTRAVENOUS; SUBCUTANEOUS ONCE
Qty: 0 | Refills: 0 | Status: COMPLETED | OUTPATIENT
Start: 2017-03-03 | End: 2017-03-03

## 2017-03-03 RX ORDER — SODIUM CHLORIDE 9 MG/ML
1000 INJECTION, SOLUTION INTRAVENOUS
Qty: 0 | Refills: 0 | Status: COMPLETED | OUTPATIENT
Start: 2017-03-03 | End: 2017-03-03

## 2017-03-03 RX ORDER — SERTRALINE 25 MG/1
100 TABLET, FILM COATED ORAL DAILY
Qty: 0 | Refills: 0 | Status: DISCONTINUED | OUTPATIENT
Start: 2017-03-03 | End: 2017-03-04

## 2017-03-03 RX ORDER — SERTRALINE 25 MG/1
1 TABLET, FILM COATED ORAL
Qty: 0 | Refills: 0 | COMMUNITY

## 2017-03-03 RX ORDER — NICOTINE POLACRILEX 2 MG
1 GUM BUCCAL DAILY
Qty: 0 | Refills: 0 | Status: DISCONTINUED | OUTPATIENT
Start: 2017-03-03 | End: 2017-03-04

## 2017-03-03 RX ORDER — CHOLECALCIFEROL (VITAMIN D3) 125 MCG
1 CAPSULE ORAL
Qty: 0 | Refills: 0 | COMMUNITY

## 2017-03-03 RX ORDER — SODIUM CHLORIDE 9 MG/ML
1000 INJECTION INTRAMUSCULAR; INTRAVENOUS; SUBCUTANEOUS ONCE
Qty: 0 | Refills: 0 | Status: COMPLETED | OUTPATIENT
Start: 2017-03-03 | End: 2017-03-03

## 2017-03-03 RX ADMIN — SODIUM CHLORIDE 1000 MILLILITER(S): 9 INJECTION INTRAMUSCULAR; INTRAVENOUS; SUBCUTANEOUS at 11:13

## 2017-03-03 RX ADMIN — Medication 1 MILLIGRAM(S): at 11:24

## 2017-03-03 RX ADMIN — Medication 50 MILLIGRAM(S): at 21:15

## 2017-03-03 RX ADMIN — Medication 2 MILLIGRAM(S): at 19:44

## 2017-03-03 RX ADMIN — Medication 2 MILLIGRAM(S): at 16:20

## 2017-03-03 RX ADMIN — SODIUM CHLORIDE 3 MILLILITER(S): 9 INJECTION INTRAMUSCULAR; INTRAVENOUS; SUBCUTANEOUS at 10:47

## 2017-03-03 RX ADMIN — SODIUM CHLORIDE 100 MILLILITER(S): 9 INJECTION, SOLUTION INTRAVENOUS at 21:15

## 2017-03-03 NOTE — ED PROVIDER NOTE - MEDICAL DECISION MAKING DETAILS
27yo F with PMH drug and alcohol abuse. Last admission to [facility] in january for heroine and alcohol intoxication. Her maternal grandfather  from alcohol abuse and her father is now in process of  mother due to drinking problem. She is an , but laid off from job because was unable to perform her tasks. Presented today with alcohol withdrawal after drinking all night until 4AM c/o shaking, nausea. No fever/chills, no hallucination. Feels very depressed but denies SI. No drugs since 2017. Will obtain blood work, IV hydration, psych consult, CIWA protocal , and admission to hospital. ZR
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
## 2017-03-03 NOTE — ED PROVIDER NOTE - PROGRESS NOTE DETAILS
psych called case discussed Pt was seen by psychiatrist. Rec trazodone 50mg at night PRN for insomnia and zoloft 50mg PO daily. Admission to medicine. ZR
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
## 2017-03-03 NOTE — ED PROVIDER NOTE - CARE PLAN
Principal Discharge DX:	Alcohol withdrawal Principal Discharge DX:	Alcohol withdrawal  Secondary Diagnosis:	Tachycardia

## 2017-03-03 NOTE — ED ADULT NURSE NOTE - OBJECTIVE STATEMENT
Patient  is  alert  and  oriented x3.  Color  is  good  and  skin warm to touch. She  is  c/o  hallucination and tremors.  She  appears  anxious.

## 2017-03-03 NOTE — ED ADULT NURSE REASSESSMENT NOTE - COMFORT CARE
plan of care explained/meal provided/wait time explained/side rails up/warm blanket provided/assisted to bathroom

## 2017-03-03 NOTE — H&P ADULT. - ASSESSMENT
Ms. Humphrey is a 25YO F PMHx of anxiety, depression, alcohol abuse, polysubstance abuse (cocaine, xanax) presents with alcohol withdrawal with last drink at 4AM.

## 2017-03-03 NOTE — H&P ADULT. - RS GEN PE MLT RESP DETAILS PC
airway patent/good air movement/clear to auscultation bilaterally/respirations non-labored/breath sounds equal/normal

## 2017-03-03 NOTE — H&P ADULT. - PROBLEM SELECTOR PLAN 1
GENEVIEVE with symptom trigger ativan   banana bag  multivitamin  c/s SW for substance abuse resources

## 2017-03-04 ENCOUNTER — TRANSCRIPTION ENCOUNTER (OUTPATIENT)
Age: 27
End: 2017-03-04

## 2017-03-04 VITALS
SYSTOLIC BLOOD PRESSURE: 140 MMHG | RESPIRATION RATE: 16 BRPM | DIASTOLIC BLOOD PRESSURE: 96 MMHG | TEMPERATURE: 98 F | OXYGEN SATURATION: 95 % | HEART RATE: 88 BPM

## 2017-03-04 LAB
ALBUMIN SERPL ELPH-MCNC: 4.1 G/DL — SIGNIFICANT CHANGE UP (ref 3.3–5)
ALP SERPL-CCNC: 68 U/L — SIGNIFICANT CHANGE UP (ref 40–120)
ALT FLD-CCNC: 79 U/L RC — HIGH (ref 10–45)
ANION GAP SERPL CALC-SCNC: 15 MMOL/L — SIGNIFICANT CHANGE UP (ref 5–17)
AST SERPL-CCNC: 102 U/L — HIGH (ref 10–40)
BILIRUB SERPL-MCNC: 1 MG/DL — SIGNIFICANT CHANGE UP (ref 0.2–1.2)
BUN SERPL-MCNC: 9 MG/DL — SIGNIFICANT CHANGE UP (ref 7–23)
CALCIUM SERPL-MCNC: 8.6 MG/DL — SIGNIFICANT CHANGE UP (ref 8.4–10.5)
CHLORIDE SERPL-SCNC: 101 MMOL/L — SIGNIFICANT CHANGE UP (ref 96–108)
CO2 SERPL-SCNC: 22 MMOL/L — SIGNIFICANT CHANGE UP (ref 22–31)
CREAT SERPL-MCNC: 0.56 MG/DL — SIGNIFICANT CHANGE UP (ref 0.5–1.3)
GLUCOSE SERPL-MCNC: 85 MG/DL — SIGNIFICANT CHANGE UP (ref 70–99)
HCT VFR BLD CALC: 35.5 % — SIGNIFICANT CHANGE UP (ref 34.5–45)
HGB BLD-MCNC: 12.2 G/DL — SIGNIFICANT CHANGE UP (ref 11.5–15.5)
MAGNESIUM SERPL-MCNC: 1.7 MG/DL — SIGNIFICANT CHANGE UP (ref 1.6–2.6)
MCHC RBC-ENTMCNC: 33.1 PG — SIGNIFICANT CHANGE UP (ref 27–34)
MCHC RBC-ENTMCNC: 34.3 GM/DL — SIGNIFICANT CHANGE UP (ref 32–36)
MCV RBC AUTO: 96.6 FL — SIGNIFICANT CHANGE UP (ref 80–100)
PHOSPHATE SERPL-MCNC: 4 MG/DL — SIGNIFICANT CHANGE UP (ref 2.5–4.5)
PLATELET # BLD AUTO: 204 K/UL — SIGNIFICANT CHANGE UP (ref 150–400)
POTASSIUM SERPL-MCNC: 3.7 MMOL/L — SIGNIFICANT CHANGE UP (ref 3.5–5.3)
POTASSIUM SERPL-SCNC: 3.7 MMOL/L — SIGNIFICANT CHANGE UP (ref 3.5–5.3)
PROT SERPL-MCNC: 6.4 G/DL — SIGNIFICANT CHANGE UP (ref 6–8.3)
RBC # BLD: 3.67 M/UL — LOW (ref 3.8–5.2)
RBC # FLD: 11.6 % — SIGNIFICANT CHANGE UP (ref 10.3–14.5)
SODIUM SERPL-SCNC: 138 MMOL/L — SIGNIFICANT CHANGE UP (ref 135–145)
WBC # BLD: 4.5 K/UL — SIGNIFICANT CHANGE UP (ref 3.8–10.5)
WBC # FLD AUTO: 4.5 K/UL — SIGNIFICANT CHANGE UP (ref 3.8–10.5)

## 2017-03-04 PROCEDURE — 99232 SBSQ HOSP IP/OBS MODERATE 35: CPT | Mod: GC

## 2017-03-04 PROCEDURE — 99239 HOSP IP/OBS DSCHRG MGMT >30: CPT

## 2017-03-04 RX ORDER — SERTRALINE 25 MG/1
1 TABLET, FILM COATED ORAL
Qty: 0 | Refills: 0 | COMMUNITY

## 2017-03-04 RX ORDER — TRAZODONE HCL 50 MG
1 TABLET ORAL
Qty: 0 | Refills: 0 | COMMUNITY
Start: 2017-03-04

## 2017-03-04 RX ORDER — TRAZODONE HCL 50 MG
1 TABLET ORAL
Qty: 14 | Refills: 0 | OUTPATIENT
Start: 2017-03-04 | End: 2017-03-18

## 2017-03-04 RX ORDER — SERTRALINE 25 MG/1
1 TABLET, FILM COATED ORAL
Qty: 14 | Refills: 0 | OUTPATIENT
Start: 2017-03-04 | End: 2017-03-18

## 2017-03-04 RX ADMIN — Medication 1 TABLET(S): at 12:50

## 2017-03-04 RX ADMIN — Medication 2 MILLIGRAM(S): at 12:49

## 2017-03-04 RX ADMIN — SERTRALINE 100 MILLIGRAM(S): 25 TABLET, FILM COATED ORAL at 12:50

## 2017-03-04 NOTE — DISCHARGE NOTE ADULT - HOSPITAL COURSE
Ms. Humphrey is a 27YO F PMHx of anxiety, depression, alcohol abuse, polysubstance abuse (cocaine, xanax) presents with alcohol withdrawal with last drink at 4AM. Patient was placed on CIWA with ativan symptom trigger. Psych was consulted and recommended zoloft and trazodone for sleep. Patient was monitored overnight and CIWA scores were 1. Patient was dc'd with prescriptions for zoloft and trazodone.

## 2017-03-04 NOTE — DISCHARGE NOTE ADULT - PLAN OF CARE
abstinence from alcohol You came in with alcohol withdrawal. You were placed on medications to prevent complications of alcohol withdrawal. You were given alcohol rehab resources. Please abstain from alcohol. Please take zoloft 100mg once a day. Please follow up with your PCP in 1 week. Please take trazodone 50mg once a day. Please follow up with your PCP in 1 week.

## 2017-03-04 NOTE — DISCHARGE NOTE ADULT - CARE PLAN
Principal Discharge DX:	Alcohol withdrawal  Goal:	abstinence from alcohol  Instructions for follow-up, activity and diet:	You came in with alcohol withdrawal. You were placed on medications to prevent complications of alcohol withdrawal. You were given alcohol rehab resources. Please abstain from alcohol.  Secondary Diagnosis:	Depression  Instructions for follow-up, activity and diet:	Please take zoloft 100mg once a day. Please follow up with your PCP in 1 week.  Secondary Diagnosis:	Insomnia  Instructions for follow-up, activity and diet:	Please take trazodone 50mg once a day. Please follow up with your PCP in 1 week.

## 2017-03-04 NOTE — DISCHARGE NOTE ADULT - MEDICATION SUMMARY - MEDICATIONS TO TAKE
I will START or STAY ON the medications listed below when I get home from the hospital:    sertraline 50 mg oral tablet  -- 1 tab(s) by mouth once a day  -- It is very important that you take or use this exactly as directed.  Do not skip doses or discontinue unless directed by your doctor.  May cause drowsiness.  Alcohol may intensify this effect.  Use care when operating dangerous machinery.  Obtain medical advice before taking any non-prescription drugs as some may affect the action of this medication.    -- Indication: For Depression    traZODone 50 mg oral tablet  -- 1 tab(s) by mouth once a day (at bedtime)  -- Indication: For Insomnia

## 2017-03-04 NOTE — DISCHARGE NOTE ADULT - PATIENT PORTAL LINK FT
“You can access the FollowHealth Patient Portal, offered by [facility], by registering with the following website: [email or web address]”

## 2017-03-07 LAB
AMPHETAMINES BLD QL SCN: NEGATIVE — SIGNIFICANT CHANGE UP
BARBITURATES SERPLBLD QL: NEGATIVE — SIGNIFICANT CHANGE UP
BENZODIAZAPINES, SERUM: NEGATIVE — SIGNIFICANT CHANGE UP
CANNABINOIDS SERPLBLD QL SCN: NEGATIVE — SIGNIFICANT CHANGE UP
COCAINE+BZE SERPLBLD QL SCN: NEGATIVE — SIGNIFICANT CHANGE UP
METHADONE SERPL-MCNC: NEGATIVE — SIGNIFICANT CHANGE UP
OPIATES SERPL QL: NEGATIVE — SIGNIFICANT CHANGE UP
PCP BLD QL SCN: NEGATIVE — SIGNIFICANT CHANGE UP

## 2017-03-12 PROCEDURE — 83735 ASSAY OF MAGNESIUM: CPT

## 2017-03-12 PROCEDURE — 81001 URINALYSIS AUTO W/SCOPE: CPT

## 2017-03-12 PROCEDURE — 99285 EMERGENCY DEPT VISIT HI MDM: CPT | Mod: 25

## 2017-03-12 PROCEDURE — 82803 BLOOD GASES ANY COMBINATION: CPT

## 2017-03-12 PROCEDURE — 85027 COMPLETE CBC AUTOMATED: CPT

## 2017-03-12 PROCEDURE — 84100 ASSAY OF PHOSPHORUS: CPT

## 2017-03-12 PROCEDURE — 96374 THER/PROPH/DIAG INJ IV PUSH: CPT

## 2017-03-12 PROCEDURE — 80307 DRUG TEST PRSMV CHEM ANLYZR: CPT

## 2017-03-12 PROCEDURE — 93005 ELECTROCARDIOGRAM TRACING: CPT

## 2017-03-12 PROCEDURE — 80053 COMPREHEN METABOLIC PANEL: CPT

## 2017-03-12 PROCEDURE — 71046 X-RAY EXAM CHEST 2 VIEWS: CPT

## 2017-08-21 ENCOUNTER — INPATIENT (INPATIENT)
Facility: HOSPITAL | Age: 27
LOS: 2 days | Discharge: HOME | DRG: 897 | End: 2017-08-24
Attending: HOSPITALIST | Admitting: INTERNAL MEDICINE
Payer: COMMERCIAL

## 2017-08-21 VITALS
DIASTOLIC BLOOD PRESSURE: 99 MMHG | SYSTOLIC BLOOD PRESSURE: 146 MMHG | RESPIRATION RATE: 20 BRPM | TEMPERATURE: 99 F | OXYGEN SATURATION: 98 % | HEART RATE: 114 BPM

## 2017-08-21 DIAGNOSIS — F10.10 ALCOHOL ABUSE, UNCOMPLICATED: ICD-10-CM

## 2017-08-21 DIAGNOSIS — F10.230 ALCOHOL DEPENDENCE WITH WITHDRAWAL, UNCOMPLICATED: ICD-10-CM

## 2017-08-21 DIAGNOSIS — F34.1 DYSTHYMIC DISORDER: ICD-10-CM

## 2017-08-21 DIAGNOSIS — F17.200 NICOTINE DEPENDENCE, UNSPECIFIED, UNCOMPLICATED: ICD-10-CM

## 2017-08-21 DIAGNOSIS — F33.9 MAJOR DEPRESSIVE DISORDER, RECURRENT, UNSPECIFIED: ICD-10-CM

## 2017-08-21 LAB
ALBUMIN SERPL ELPH-MCNC: 5.2 G/DL — HIGH (ref 3.3–5)
ALP SERPL-CCNC: 126 U/L — HIGH (ref 40–120)
ALT FLD-CCNC: 228 U/L RC — HIGH (ref 10–45)
ANION GAP SERPL CALC-SCNC: 19 MMOL/L — HIGH (ref 5–17)
APPEARANCE UR: CLEAR — SIGNIFICANT CHANGE UP
APTT BLD: 30.9 SEC — SIGNIFICANT CHANGE UP (ref 27.5–37.4)
AST SERPL-CCNC: 337 U/L — HIGH (ref 10–40)
BACTERIA # UR AUTO: ABNORMAL /HPF
BASOPHILS # BLD AUTO: 0.1 K/UL — SIGNIFICANT CHANGE UP (ref 0–0.2)
BASOPHILS NFR BLD AUTO: 1.6 % — SIGNIFICANT CHANGE UP (ref 0–2)
BILIRUB SERPL-MCNC: 0.5 MG/DL — SIGNIFICANT CHANGE UP (ref 0.2–1.2)
BILIRUB UR-MCNC: NEGATIVE — SIGNIFICANT CHANGE UP
BUN SERPL-MCNC: 4 MG/DL — LOW (ref 7–23)
CALCIUM SERPL-MCNC: 9.7 MG/DL — SIGNIFICANT CHANGE UP (ref 8.4–10.5)
CHLORIDE SERPL-SCNC: 100 MMOL/L — SIGNIFICANT CHANGE UP (ref 96–108)
CO2 SERPL-SCNC: 21 MMOL/L — LOW (ref 22–31)
COLOR SPEC: SIGNIFICANT CHANGE UP
CREAT SERPL-MCNC: 0.58 MG/DL — SIGNIFICANT CHANGE UP (ref 0.5–1.3)
DIFF PNL FLD: ABNORMAL
EOSINOPHIL # BLD AUTO: 0.3 K/UL — SIGNIFICANT CHANGE UP (ref 0–0.5)
EOSINOPHIL NFR BLD AUTO: 5.2 % — SIGNIFICANT CHANGE UP (ref 0–6)
EPI CELLS # UR: SIGNIFICANT CHANGE UP /HPF
ETHANOL SERPL-MCNC: 274 MG/DL — HIGH (ref 0–10)
GLUCOSE SERPL-MCNC: 106 MG/DL — HIGH (ref 70–99)
GLUCOSE UR QL: NEGATIVE — SIGNIFICANT CHANGE UP
HCT VFR BLD CALC: 42.1 % — SIGNIFICANT CHANGE UP (ref 34.5–45)
HGB BLD-MCNC: 14.6 G/DL — SIGNIFICANT CHANGE UP (ref 11.5–15.5)
INR BLD: 0.92 RATIO — SIGNIFICANT CHANGE UP (ref 0.88–1.16)
KETONES UR-MCNC: NEGATIVE — SIGNIFICANT CHANGE UP
LEUKOCYTE ESTERASE UR-ACNC: NEGATIVE — SIGNIFICANT CHANGE UP
LYMPHOCYTES # BLD AUTO: 1.6 K/UL — SIGNIFICANT CHANGE UP (ref 1–3.3)
LYMPHOCYTES # BLD AUTO: 28.5 % — SIGNIFICANT CHANGE UP (ref 13–44)
MAGNESIUM SERPL-MCNC: 2 MG/DL — SIGNIFICANT CHANGE UP (ref 1.6–2.6)
MCHC RBC-ENTMCNC: 34.6 GM/DL — SIGNIFICANT CHANGE UP (ref 32–36)
MCHC RBC-ENTMCNC: 34.6 PG — HIGH (ref 27–34)
MCV RBC AUTO: 100 FL — SIGNIFICANT CHANGE UP (ref 80–100)
MONOCYTES # BLD AUTO: 0.7 K/UL — SIGNIFICANT CHANGE UP (ref 0–0.9)
MONOCYTES NFR BLD AUTO: 11.5 % — SIGNIFICANT CHANGE UP (ref 2–14)
NEUTROPHILS # BLD AUTO: 3.1 K/UL — SIGNIFICANT CHANGE UP (ref 1.8–7.4)
NEUTROPHILS NFR BLD AUTO: 53.2 % — SIGNIFICANT CHANGE UP (ref 43–77)
NITRITE UR-MCNC: NEGATIVE — SIGNIFICANT CHANGE UP
PH UR: 6 — SIGNIFICANT CHANGE UP (ref 5–8)
PHOSPHATE SERPL-MCNC: 3.5 MG/DL — SIGNIFICANT CHANGE UP (ref 2.5–4.5)
PLATELET # BLD AUTO: 250 K/UL — SIGNIFICANT CHANGE UP (ref 150–400)
POTASSIUM SERPL-MCNC: 4.2 MMOL/L — SIGNIFICANT CHANGE UP (ref 3.5–5.3)
POTASSIUM SERPL-SCNC: 4.2 MMOL/L — SIGNIFICANT CHANGE UP (ref 3.5–5.3)
PROT SERPL-MCNC: 8.4 G/DL — HIGH (ref 6–8.3)
PROT UR-MCNC: NEGATIVE — SIGNIFICANT CHANGE UP
PROTHROM AB SERPL-ACNC: 9.9 SEC — SIGNIFICANT CHANGE UP (ref 9.8–12.7)
RBC # BLD: 4.2 M/UL — SIGNIFICANT CHANGE UP (ref 3.8–5.2)
RBC # FLD: 10.7 % — SIGNIFICANT CHANGE UP (ref 10.3–14.5)
RBC CASTS # UR COMP ASSIST: SIGNIFICANT CHANGE UP /HPF (ref 0–2)
SODIUM SERPL-SCNC: 140 MMOL/L — SIGNIFICANT CHANGE UP (ref 135–145)
SP GR SPEC: 1.01 — LOW (ref 1.01–1.02)
UROBILINOGEN FLD QL: NEGATIVE — SIGNIFICANT CHANGE UP
WBC # BLD: 5.8 K/UL — SIGNIFICANT CHANGE UP (ref 3.8–10.5)
WBC # FLD AUTO: 5.8 K/UL — SIGNIFICANT CHANGE UP (ref 3.8–10.5)
WBC UR QL: SIGNIFICANT CHANGE UP /HPF (ref 0–5)

## 2017-08-21 PROCEDURE — 99285 EMERGENCY DEPT VISIT HI MDM: CPT | Mod: 25

## 2017-08-21 PROCEDURE — 93010 ELECTROCARDIOGRAM REPORT: CPT

## 2017-08-21 PROCEDURE — 99222 1ST HOSP IP/OBS MODERATE 55: CPT

## 2017-08-21 RX ORDER — SODIUM CHLORIDE 9 MG/ML
1000 INJECTION INTRAMUSCULAR; INTRAVENOUS; SUBCUTANEOUS ONCE
Qty: 0 | Refills: 0 | Status: COMPLETED | OUTPATIENT
Start: 2017-08-21 | End: 2017-08-21

## 2017-08-21 RX ORDER — THIAMINE MONONITRATE (VIT B1) 100 MG
100 TABLET ORAL ONCE
Qty: 0 | Refills: 0 | Status: COMPLETED | OUTPATIENT
Start: 2017-08-21 | End: 2017-08-21

## 2017-08-21 RX ADMIN — Medication 1 TABLET(S): at 21:51

## 2017-08-21 RX ADMIN — SODIUM CHLORIDE 1000 MILLILITER(S): 9 INJECTION INTRAMUSCULAR; INTRAVENOUS; SUBCUTANEOUS at 21:52

## 2017-08-21 RX ADMIN — Medication 100 MILLIGRAM(S): at 23:56

## 2017-08-21 RX ADMIN — Medication 1 MILLIGRAM(S): at 21:51

## 2017-08-21 NOTE — ED PROVIDER NOTE - OBJECTIVE STATEMENT
25YO F PMHx of anxiety, depression, alcohol abuse, polysubstance abuse (cocaine, xanax) presents with alcohol withdrawal. Pt has recent hx of withdrawl, last was in march. 27YO F PMHx of anxiety, depression, alcohol abuse, polysubstance abuse (cocaine, xanax) presents with alcohol withdrawal. Pt has recent hx of withdrawal, last was in march. Seen by psych during last admission, started on trazadone and zoloft, states that she took it for a month and then when meds finished she did not refill it. Pt presents asking for help for detox, complaining of nausea, tremors and poor sleep for past 2-3 days. Likely drink 8hrs ago, beer and wine.
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
## 2017-08-21 NOTE — ED BEHAVIORAL HEALTH ASSESSMENT NOTE - DIFFERENTIAL
Major Depressive Disorder, dysthymia, Substance-induced depression, bipolar disorder, Alcohol dependence, alcohol withdrawal, tobacco use  disorder

## 2017-08-21 NOTE — ED PROVIDER NOTE - PHYSICAL EXAMINATION
Gen: Well appearing, anxious, crying during exam, AOx3  Head: NCAT  HEENT: PERRL, MMM, normal conjunctiva  Lung: CTAB, no rales, rhonchi or wheezing  CV: tachy, S1/S2, no murmurs, rubs or gallops  Abd: soft, NTND, no rebound or guarding  MSK: No CVA tenderness. No edema, no visible deformities  Neuro: No focal neurologic deficits. CN II-XII intact. Normal strength and sensation x 4. Tremors in extremities and tongue fasciculations.   Skin: Warm and dry, no evidence of rash  Psych: Anxious and depressed affect

## 2017-08-21 NOTE — ED BEHAVIORAL HEALTH ASSESSMENT NOTE - SUBSTANCE ISSUES AND PLAN (INCLUDE STANDING AND PRN MEDICATION)
CIWA Monitoring with Ativan taper CIWA Monitoring with Ativan taper, MVI, folate 1mg PO Q day, thiamine 100mg PO q day; SW assistance with finding outpatient substance resources that accept her insurance would be helpful

## 2017-08-21 NOTE — ED PROVIDER NOTE - NS ED ROS FT
ROS: denies HA, weakness, dizziness, fevers/chills, vomiting, chest pain, SOB, diaphoresis, abdominal pain, back/neck pain, dysuria/hematuria, or rash  +depression, tremors, nausea, crying
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
## 2017-08-21 NOTE — ED BEHAVIORAL HEALTH ASSESSMENT NOTE - PSYCHIATRIC ISSUES AND PLAN (INCLUDE STANDING AND PRN MEDICATION)
Haldol 5mg/Ativan 2mg/Benadryl 50mg PO/IM/IV Q 6 hours PRN agitation not due to withdrawal Increasing Zoloft to 75mg, Haldol 5mg/Ativan 2mg/Benadryl 50mg PO/IM/IV Q 6 hours PRN agitation not due to withdrawal

## 2017-08-21 NOTE — ED ADULT TRIAGE NOTE - CHIEF COMPLAINT QUOTE
pt c/o "withdrawing from alcohol- feeling shaky/headache. last drank earlier today and am a daily drinker" + tremors noted in triage

## 2017-08-21 NOTE — ED ADULT NURSE NOTE - OBJECTIVE STATEMENT
28 y/o female pmhx depression and polysubstance abuse states she came in today for "alcohol withdrawal." Pt presents with anxiety, chest pain non-radiating and SOB. Pt states last drink was "beer, 8 hours ago." Upon assessment, Pt. A&Ox3, airway patent and intact, ABD soft nontender, complaint of n/v this morning x1 episode. Denies blood in urine and/or stool. Skin intact. Peripheral pulses strong and normal baseline sensation present x4, mild tremors present. Safety and comfort measures maintained.
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
## 2017-08-22 DIAGNOSIS — G47.00 INSOMNIA, UNSPECIFIED: ICD-10-CM

## 2017-08-22 DIAGNOSIS — Z29.9 ENCOUNTER FOR PROPHYLACTIC MEASURES, UNSPECIFIED: ICD-10-CM

## 2017-08-22 DIAGNOSIS — F32.9 MAJOR DEPRESSIVE DISORDER, SINGLE EPISODE, UNSPECIFIED: ICD-10-CM

## 2017-08-22 DIAGNOSIS — F33.1 MAJOR DEPRESSIVE DISORDER, RECURRENT, MODERATE: ICD-10-CM

## 2017-08-22 DIAGNOSIS — F10.230 ALCOHOL DEPENDENCE WITH WITHDRAWAL, UNCOMPLICATED: ICD-10-CM

## 2017-08-22 DIAGNOSIS — F10.239 ALCOHOL DEPENDENCE WITH WITHDRAWAL, UNSPECIFIED: ICD-10-CM

## 2017-08-22 DIAGNOSIS — R74.8 ABNORMAL LEVELS OF OTHER SERUM ENZYMES: ICD-10-CM

## 2017-08-22 LAB
ALBUMIN SERPL ELPH-MCNC: 4.4 G/DL — SIGNIFICANT CHANGE UP (ref 3.3–5)
ALP SERPL-CCNC: 102 U/L — SIGNIFICANT CHANGE UP (ref 40–120)
ALT FLD-CCNC: 184 U/L — HIGH (ref 10–45)
ANION GAP SERPL CALC-SCNC: 18 MMOL/L — HIGH (ref 5–17)
AST SERPL-CCNC: 272 U/L — HIGH (ref 10–40)
BILIRUB SERPL-MCNC: 0.6 MG/DL — SIGNIFICANT CHANGE UP (ref 0.2–1.2)
BUN SERPL-MCNC: 4 MG/DL — LOW (ref 7–23)
CALCIUM SERPL-MCNC: 8.8 MG/DL — SIGNIFICANT CHANGE UP (ref 8.4–10.5)
CHLORIDE SERPL-SCNC: 100 MMOL/L — SIGNIFICANT CHANGE UP (ref 96–108)
CO2 SERPL-SCNC: 21 MMOL/L — LOW (ref 22–31)
CREAT SERPL-MCNC: 0.52 MG/DL — SIGNIFICANT CHANGE UP (ref 0.5–1.3)
GLUCOSE SERPL-MCNC: 80 MG/DL — SIGNIFICANT CHANGE UP (ref 70–99)
HCT VFR BLD CALC: 35.8 % — SIGNIFICANT CHANGE UP (ref 34.5–45)
HGB BLD-MCNC: 12.3 G/DL — SIGNIFICANT CHANGE UP (ref 11.5–15.5)
MAGNESIUM SERPL-MCNC: 1.8 MG/DL — SIGNIFICANT CHANGE UP (ref 1.6–2.6)
MCHC RBC-ENTMCNC: 32.9 PG — SIGNIFICANT CHANGE UP (ref 27–34)
MCHC RBC-ENTMCNC: 34.4 GM/DL — SIGNIFICANT CHANGE UP (ref 32–36)
MCV RBC AUTO: 95.7 FL — SIGNIFICANT CHANGE UP (ref 80–100)
PHOSPHATE SERPL-MCNC: 3.5 MG/DL — SIGNIFICANT CHANGE UP (ref 2.5–4.5)
PLATELET # BLD AUTO: 239 K/UL — SIGNIFICANT CHANGE UP (ref 150–400)
POTASSIUM SERPL-MCNC: 4 MMOL/L — SIGNIFICANT CHANGE UP (ref 3.5–5.3)
POTASSIUM SERPL-SCNC: 4 MMOL/L — SIGNIFICANT CHANGE UP (ref 3.5–5.3)
PROT SERPL-MCNC: 7.3 G/DL — SIGNIFICANT CHANGE UP (ref 6–8.3)
RBC # BLD: 3.74 M/UL — LOW (ref 3.8–5.2)
RBC # FLD: 12.3 % — SIGNIFICANT CHANGE UP (ref 10.3–14.5)
SODIUM SERPL-SCNC: 139 MMOL/L — SIGNIFICANT CHANGE UP (ref 135–145)
WBC # BLD: 5.11 K/UL — SIGNIFICANT CHANGE UP (ref 3.8–10.5)
WBC # FLD AUTO: 5.11 K/UL — SIGNIFICANT CHANGE UP (ref 3.8–10.5)

## 2017-08-22 PROCEDURE — 99223 1ST HOSP IP/OBS HIGH 75: CPT | Mod: AI

## 2017-08-22 PROCEDURE — 76700 US EXAM ABDOM COMPLETE: CPT | Mod: 26

## 2017-08-22 PROCEDURE — 99232 SBSQ HOSP IP/OBS MODERATE 35: CPT

## 2017-08-22 PROCEDURE — 12345: CPT | Mod: NC

## 2017-08-22 RX ORDER — THIAMINE MONONITRATE (VIT B1) 100 MG
100 TABLET ORAL DAILY
Qty: 0 | Refills: 0 | Status: DISCONTINUED | OUTPATIENT
Start: 2017-08-22 | End: 2017-08-24

## 2017-08-22 RX ORDER — FOLIC ACID 0.8 MG
1 TABLET ORAL DAILY
Qty: 0 | Refills: 0 | Status: DISCONTINUED | OUTPATIENT
Start: 2017-08-22 | End: 2017-08-24

## 2017-08-22 RX ORDER — ENOXAPARIN SODIUM 100 MG/ML
40 INJECTION SUBCUTANEOUS DAILY
Qty: 0 | Refills: 0 | Status: DISCONTINUED | OUTPATIENT
Start: 2017-08-22 | End: 2017-08-24

## 2017-08-22 RX ORDER — SERTRALINE 25 MG/1
75 TABLET, FILM COATED ORAL DAILY
Qty: 0 | Refills: 0 | Status: DISCONTINUED | OUTPATIENT
Start: 2017-08-22 | End: 2017-08-24

## 2017-08-22 RX ORDER — TRAZODONE HCL 50 MG
50 TABLET ORAL AT BEDTIME
Qty: 0 | Refills: 0 | Status: DISCONTINUED | OUTPATIENT
Start: 2017-08-22 | End: 2017-08-24

## 2017-08-22 RX ORDER — NICOTINE POLACRILEX 2 MG
1 GUM BUCCAL DAILY
Qty: 0 | Refills: 0 | Status: DISCONTINUED | OUTPATIENT
Start: 2017-08-22 | End: 2017-08-24

## 2017-08-22 RX ADMIN — Medication 100 MILLIGRAM(S): at 11:24

## 2017-08-22 RX ADMIN — Medication 2 MILLIGRAM(S): at 22:21

## 2017-08-22 RX ADMIN — Medication 1 PATCH: at 11:24

## 2017-08-22 RX ADMIN — Medication 2 MILLIGRAM(S): at 16:12

## 2017-08-22 RX ADMIN — Medication 2 MILLIGRAM(S): at 06:30

## 2017-08-22 RX ADMIN — SERTRALINE 75 MILLIGRAM(S): 25 TABLET, FILM COATED ORAL at 11:24

## 2017-08-22 RX ADMIN — Medication 50 MILLIGRAM(S): at 23:54

## 2017-08-22 RX ADMIN — Medication 1 MILLIGRAM(S): at 11:24

## 2017-08-22 NOTE — H&P ADULT - NSHPSOCIALHISTORY_GEN_ALL_CORE
Social History:    Marital Status:  (   )    ( x  ) Single    (   )    (  )   Occupation:   Lives with: (  ) alone  (  ) children   (  ) spouse   ( x ) parents  (  ) other    Substance Use (street drugs): ( x )  used in past   (  ) other:  Tobacco Usage:  (   ) never smoked   (   ) former smoker   ( x  ) current smoker  (     ) pack year  (        ) last cigarette date  Alcohol Usage: See HPI
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
## 2017-08-22 NOTE — PROGRESS NOTE BEHAVIORAL HEALTH - NSBHFUPINTERVALCCFT_PSY_A_CORE
Pt seen, chart reviewed. She reports some abdominal cramps, but no nausea or vomiting. Reports headache. But no hallucinations, palpitations, anxiety. Received Ativan PRN some hours ago. Feels overall comfortable. Not interested in inpt rehab. Is looking for outpt psychiatrist who takes her insurance ([employer]), lives in [place] and has no objection to follow at [facility].

## 2017-08-22 NOTE — H&P ADULT - ASSESSMENT
27 F w/ pmh anxiety, depression, alcohol abuse, polysubstance abuse (cocaine, xanax) p/w  alcohol withdrawal , Blood alcohol level 274, elevated liver tests.

## 2017-08-22 NOTE — PROGRESS NOTE ADULT - PROBLEM SELECTOR PLAN 1
CIWA 2-5 will treat symptom triggered prn doses, has only required 2 doses since 5am  - will give extra 2mg dose now as feeling anxious  - ativan 2mg PRN for CIWA > 8 or >2 increase in CIWA  - if any worsening in ativan, requirements overnight can change to ativan taper but at this point is fairly stable  -c/w thiamine and folate  -fall risk  -electrolytes wnl
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
## 2017-08-22 NOTE — PROGRESS NOTE ADULT - SUBJECTIVE AND OBJECTIVE BOX
Patient is a 27y old  Female who presents with a chief complaint of alcohol withdrawal (22 Aug 2017 00:55)      SUBJECTIVE / OVERNIGHT EVENTS: No o/v events. Admits to some anxiety and tremors. Denies hallucinations, agitation. Feels well otherwise. trying to detox off alcohol. Denies hx of seizure, intubations, DT's, MICU stay. Usually stays in hospital 2-3 days for detox. Wants trazadone for sleep as uses it at home    MEDICATIONS  (STANDING):  nicotine - 21 mG/24Hr(s) Patch 1 patch Transdermal daily  thiamine 100 milliGRAM(s) Oral daily  folic acid 1 milliGRAM(s) Oral daily  sertraline 75 milliGRAM(s) Oral daily    MEDICATIONS  (PRN):  LORazepam     Tablet 2 milliGRAM(s) Oral every 2 hours PRN Symptom-triggered 2 point increase in CIWA-Ar  LORazepam   Injectable 2 milliGRAM(s) IV Push every 1 hour PRN Symptom-triggered: each CIWA -Ar score 8 or GREATER  traZODone 50 milliGRAM(s) Oral at bedtime PRN insomnia      Vital Signs Last 24 Hrs  T(C): 37.8 (22 Aug 2017 13:50), Max: 37.8 (22 Aug 2017 13:50)  T(F): 100.1 (22 Aug 2017 13:50), Max: 100.1 (22 Aug 2017 13:50)  HR: 98 (22 Aug 2017 13:50) (93 - 114)  BP: 127/75 (22 Aug 2017 13:50) (107/72 - 146/99)  BP(mean): --  RR: 18 (22 Aug 2017 13:50) (18 - 20)  SpO2: 98% (22 Aug 2017 13:50) (95% - 99%)  CAPILLARY BLOOD GLUCOSE        I&O's Summary      PHYSICAL EXAM:  GENERAL: NAD, well-developed  HEAD:  Atraumatic, Normocephalic  EYES: EOMI, PERRLA, conjunctiva and sclera clear, minimal tounge fasiculations  NECK: Supple, No JVD  CHEST/LUNG: Clear to auscultation bilaterally; No wheeze  HEART: Regular rate and rhythm; No murmurs, rubs, or gallops  ABDOMEN: Soft, Nontender, Nondistended; Bowel sounds present  EXTREMITIES:  mild b/l UE tremors,   PSYCH: AAOx3  NEUROLOGY: non-focal  SKIN: No rashes or lesions    LABS:                        12.3   5.11  )-----------( 239      ( 22 Aug 2017 07:47 )             35.8         139  |  100  |  4<L>  ----------------------------<  80  4.0   |  21<L>  |  0.52    Ca    8.8      22 Aug 2017 07:47  Phos  3.5       Mg     1.8         TPro  7.3  /  Alb  4.4  /  TBili  0.6  /  DBili  x   /  AST  272<H>  /  ALT  184<H>  /  AlkPhos  102      PT/INR - ( 21 Aug 2017 21:24 )   PT: 9.9 sec;   INR: 0.92 ratio         PTT - ( 21 Aug 2017 21:24 )  PTT:30.9 sec      Urinalysis Basic - ( 21 Aug 2017 23:01 )    Color: x / Appearance: Clear / S.008 / pH: x  Gluc: x / Ketone: Negative  / Bili: Negative / Urobili: Negative   Blood: x / Protein: Negative / Nitrite: Negative   Leuk Esterase: Negative / RBC: 0-2 /HPF / WBC 3-5 /HPF   Sq Epi: x / Non Sq Epi: OCC /HPF / Bacteria: Few /HPF        RADIOLOGY & ADDITIONAL TESTS:    RUQ sono:   IMPRESSION:     Prominent liver. Hepatic steatosis. Otherwise, normal examination    Imaging Personally Reviewed:    Consultant(s) Notes Reviewed:  psych    Care Discussed with Consultants/Other Providers:

## 2017-08-22 NOTE — H&P ADULT - PROBLEM SELECTOR PLAN 2
likely 2/2 to etoh , no other signs of chronic liver dz,  nl Bili   - f/u US abdomen   - monitor liver tests

## 2017-08-22 NOTE — H&P ADULT - NSHPREVIEWOFSYSTEMS_GEN_ALL_CORE
CONSTITUTIONAL: No weakness, fevers or chills  EYES/ENT: No visual changes;  No dysphagia  NECK: No pain or stiffness  RESPIRATORY: No cough, wheezing, hemoptysis; No shortness of breath  CARDIOVASCULAR: No chest pain or palpitations; No lower extremity edema  EXTREMITIES: no le edema, cyanosis, clubbing  GASTROINTESTINAL: No abdominal or epigastric pain. No nausea, vomiting, or hematemesis; No diarrhea or constipation. No melena or hematochezia.  BACK: No back pain  GENITOURINARY: No dysuria, frequency or hematuria  NEUROLOGICAL: No numbness or weakness  MSK: no joint swelling or pain  SKIN: No itching, burning, rashes, or lesions   PSYCH: no agitation  All other review of systems is negative unless indicated above.

## 2017-08-22 NOTE — PROGRESS NOTE ADULT - ASSESSMENT
27 F w/ pmh anxiety, depression, alcohol abuse, polysubstance abuse (cocaine, xanax) p/w  alcohol withdrawal , Blood alcohol level 274, elevated liver tests with hepatic steatosis

## 2017-08-22 NOTE — H&P ADULT - PROBLEM SELECTOR PLAN 3
active depressive symptoms , case discussed with psychiatry no active suicidal ideation recommended increase in zoloft   - Psychiatry recommendations appreciated   - zoloft 75 mg daily

## 2017-08-22 NOTE — PROGRESS NOTE ADULT - PROBLEM SELECTOR PLAN 3
active depressive symptoms, but no SI/HI  -per psych zoloft increased to 75mg daily  - Psychiatry recommendations appreciated,

## 2017-08-22 NOTE — H&P ADULT - HISTORY OF PRESENT ILLNESS
Patient is a 27 year old female w/ pmh anxiety, depression, alcohol abuse, polysubstance abuse (cocaine, xanax) presents with alcohol withdrawal.   Patient reports last drink was about 8 hours prior to admission, she drinks beer and wine , about 12 pack daily for about three months , Has had w/d hallucinations in past , no seizures. Never required ICU level care.  She currently reports nausea, abdominal pain in the epigastric area non radiating, no relieving or exacerbating factors , b/l  tremors, No hallucinations.   She was last admitted in March 2017 and treated with zoloft and trazadone with a good response , but relapsed once her medication ran out. She reports feeling depressed with poor sleep and appetites, psychomotor retardation, passive suicidal ideation, no active suicidal ideation. She underwent detox twice in past with temporary response. She is currently interested in detox.

## 2017-08-22 NOTE — H&P ADULT - FAMILY HISTORY
Sibling  Still living? Unknown  Family history of MS (multiple sclerosis), Age at diagnosis: Age Unknown

## 2017-08-22 NOTE — H&P ADULT - NSHPLABSRESULTS_GEN_ALL_CORE
Labs personally reviewed:                          14.6   5.8   )-----------( 250      ( 21 Aug 2017 21:24 )             42.1         140  |  100  |  4<L>  ----------------------------<  106<H>  4.2   |  21<L>  |  0.58    Ca    9.7      21 Aug 2017 21:24  Phos  3.5       Mg     2.0         TPro  8.4<H>  /  Alb  5.2<H>  /  TBili  0.5  /  DBili  x   /  AST  337<H>  /  ALT  228<H>  /  AlkPhos  126<H>          LIVER FUNCTIONS - ( 21 Aug 2017 21:24 )  Alb: 5.2 g/dL / Pro: 8.4 g/dL / ALK PHOS: 126 U/L / ALT: 228 U/L RC / AST: 337 U/L / GGT: x           PT/INR - ( 21 Aug 2017 21:24 )   PT: 9.9 sec;   INR: 0.92 ratio         PTT - ( 21 Aug 2017 21:24 )  PTT:30.9 sec  Urinalysis Basic - ( 21 Aug 2017 23:01 )    Color: x / Appearance: Clear / S.008 / pH: x  Gluc: x / Ketone: Negative  / Bili: Negative / Urobili: Negative   Blood: x / Protein: Negative / Nitrite: Negative   Leuk Esterase: Negative / RBC: 0-2 /HPF / WBC 3-5 /HPF   Sq Epi: x / Non Sq Epi: OCC /HPF / Bacteria: Few /HPF      CAPILLARY BLOOD GLUCOSE          Imaging:    EKG personally reviewed: nsr, no acute st changes

## 2017-08-22 NOTE — H&P ADULT - PROBLEM SELECTOR PLAN 1
Current CIWA <7 , will treat symptom triggered if requires multiple PRN dosing can change to ativan taper  - ativan 2mg PRN for CIWA > 8   - f/u serum chemistry and mag   - thiamine and folate

## 2017-08-22 NOTE — PROGRESS NOTE BEHAVIORAL HEALTH - NSBHCHARTREVIEWVS_PSY_A_CORE FT
Vital Signs Last 24 Hrs  T(C): 36.7 (22 Aug 2017 07:39), Max: 37.2 (21 Aug 2017 19:52)  T(F): 98.1 (22 Aug 2017 07:39), Max: 99 (21 Aug 2017 19:52)  HR: 93 (22 Aug 2017 07:39) (93 - 114)  BP: 131/92 (22 Aug 2017 07:39) (107/72 - 146/99)  BP(mean): --  RR: 18 (22 Aug 2017 07:39) (18 - 20)  SpO2: 99% (22 Aug 2017 07:39) (95% - 99%)

## 2017-08-22 NOTE — H&P ADULT - NSHPPHYSICALEXAM_GEN_ALL_CORE
Vital Signs Last 24 Hrs  T(C): 37.1 (22 Aug 2017 00:25), Max: 37.2 (21 Aug 2017 19:52)  T(F): 98.7 (22 Aug 2017 00:25), Max: 99 (21 Aug 2017 19:52)  HR: 97 (22 Aug 2017 00:25) (97 - 114)  BP: 107/72 (22 Aug 2017 00:25) (107/72 - 146/99)  BP(mean): --  RR: 18 (22 Aug 2017 00:25) (18 - 20)  SpO2: 95% (22 Aug 2017 00:25) (95% - 98%)    GENERAL: No acute distress, well-developed  HEAD:  Atraumatic, Normocephalic  ENT: EOMI, PERRLA, conjunctiva and sclera clear,  moist mucosa no pharyngeal erythema or exudates   NECK: supple , no JVD   CHEST/LUNG: Clear to auscultation bilaterally; No wheeze, equal breath sounds bilaterally   BACK: No spinal tenderness,  No CVA tenderness   HEART: Regular rate and rhythm; No murmurs, rubs, or gallops  ABDOMEN: Soft, Nontender, Nondistended; Bowel sounds present  EXTREMITIES:  No clubbing, cyanosis, or edema  MSK: No joint swelling or effusions, ROM intact   PSYCH: depressed behavior/affect  NEUROLOGY: AAOx3,  mild tremors b/l   SKIN: Normal color, No rashes or lesions

## 2017-08-23 LAB
ALBUMIN SERPL ELPH-MCNC: 4.3 G/DL — SIGNIFICANT CHANGE UP (ref 3.3–5)
ALP SERPL-CCNC: 109 U/L — SIGNIFICANT CHANGE UP (ref 40–120)
ALT FLD-CCNC: 164 U/L — HIGH (ref 10–45)
ANION GAP SERPL CALC-SCNC: 18 MMOL/L — HIGH (ref 5–17)
AST SERPL-CCNC: 220 U/L — HIGH (ref 10–40)
BILIRUB SERPL-MCNC: 1.2 MG/DL — SIGNIFICANT CHANGE UP (ref 0.2–1.2)
BUN SERPL-MCNC: 5 MG/DL — LOW (ref 7–23)
CALCIUM SERPL-MCNC: 8.9 MG/DL — SIGNIFICANT CHANGE UP (ref 8.4–10.5)
CHLORIDE SERPL-SCNC: 97 MMOL/L — SIGNIFICANT CHANGE UP (ref 96–108)
CO2 SERPL-SCNC: 21 MMOL/L — LOW (ref 22–31)
CREAT SERPL-MCNC: 0.51 MG/DL — SIGNIFICANT CHANGE UP (ref 0.5–1.3)
GLUCOSE SERPL-MCNC: 71 MG/DL — SIGNIFICANT CHANGE UP (ref 70–99)
MAGNESIUM SERPL-MCNC: 1.9 MG/DL — SIGNIFICANT CHANGE UP (ref 1.6–2.6)
PHOSPHATE SERPL-MCNC: 4.1 MG/DL — SIGNIFICANT CHANGE UP (ref 2.5–4.5)
POTASSIUM SERPL-MCNC: 3.7 MMOL/L — SIGNIFICANT CHANGE UP (ref 3.5–5.3)
POTASSIUM SERPL-SCNC: 3.7 MMOL/L — SIGNIFICANT CHANGE UP (ref 3.5–5.3)
PROT SERPL-MCNC: 7.3 G/DL — SIGNIFICANT CHANGE UP (ref 6–8.3)
SODIUM SERPL-SCNC: 136 MMOL/L — SIGNIFICANT CHANGE UP (ref 135–145)

## 2017-08-23 PROCEDURE — 99233 SBSQ HOSP IP/OBS HIGH 50: CPT

## 2017-08-23 RX ADMIN — SERTRALINE 75 MILLIGRAM(S): 25 TABLET, FILM COATED ORAL at 11:54

## 2017-08-23 RX ADMIN — Medication 2 MILLIGRAM(S): at 13:24

## 2017-08-23 RX ADMIN — Medication 1 PATCH: at 06:20

## 2017-08-23 RX ADMIN — Medication 2 MILLIGRAM(S): at 20:54

## 2017-08-23 RX ADMIN — Medication 50 MILLIGRAM(S): at 22:41

## 2017-08-23 RX ADMIN — Medication 2 MILLIGRAM(S): at 06:26

## 2017-08-23 RX ADMIN — Medication 1 MILLIGRAM(S): at 11:54

## 2017-08-23 RX ADMIN — Medication 1 PATCH: at 06:26

## 2017-08-23 RX ADMIN — Medication 100 MILLIGRAM(S): at 13:24

## 2017-08-23 RX ADMIN — ENOXAPARIN SODIUM 40 MILLIGRAM(S): 100 INJECTION SUBCUTANEOUS at 11:54

## 2017-08-23 NOTE — PROGRESS NOTE ADULT - SUBJECTIVE AND OBJECTIVE BOX
Patient is a 27y old  Female who presents with a chief complaint of alcohol withdrawal (22 Aug 2017 00:55)      SUBJECTIVE / OVERNIGHT EVENTS: Trazadone helped her sleep well. Used ativan 2mg x 2 overnight with CIWA 2-5. Feel less anxious overall but not ready to go home as has been 48 hours since last drink. Requesting for naltrexone to be started.     MEDICATIONS  (STANDING):  nicotine - 21 mG/24Hr(s) Patch 1 patch Transdermal daily  thiamine 100 milliGRAM(s) Oral daily  folic acid 1 milliGRAM(s) Oral daily  sertraline 75 milliGRAM(s) Oral daily  enoxaparin Injectable 40 milliGRAM(s) SubCutaneous daily    MEDICATIONS  (PRN):  LORazepam     Tablet 2 milliGRAM(s) Oral every 2 hours PRN Symptom-triggered 2 point increase in CIWA-Ar  LORazepam   Injectable 2 milliGRAM(s) IV Push every 1 hour PRN Symptom-triggered: each CIWA -Ar score 8 or GREATER  traZODone 50 milliGRAM(s) Oral at bedtime PRN insomnia      Vital Signs Last 24 Hrs  T(C): 36.4 (23 Aug 2017 10:44), Max: 37.8 (22 Aug 2017 13:50)  T(F): 97.5 (23 Aug 2017 10:44), Max: 100.1 (22 Aug 2017 13:50)  HR: 106 (23 Aug 2017 10:44) (92 - 106)  BP: 115/78 (23 Aug 2017 10:44) (110/78 - 138/96)  BP(mean): --  RR: 18 (23 Aug 2017 10:44) (18 - 18)  SpO2: 98% (23 Aug 2017 10:44) (98% - 100%)  CAPILLARY BLOOD GLUCOSE        I&O's Summary    22 Aug 2017 07:01  -  23 Aug 2017 07:00  --------------------------------------------------------  IN: 240 mL / OUT: 0 mL / NET: 240 mL    23 Aug 2017 07:01  -  23 Aug 2017 12:08  --------------------------------------------------------  IN: 120 mL / OUT: 1 mL / NET: 119 mL        PHYSICAL EXAM:  GENERAL: NAD, well-developed  HEAD:  Atraumatic, Normocephalic  EYES: EOMI, PERRLA, conjunctiva and sclera clear. no tongue fasiculations  NECK: Supple, No JVD  CHEST/LUNG: Clear to auscultation bilaterally; No wheeze  HEART: Regular rate and rhythm; No murmurs, rubs, or gallops  ABDOMEN: Soft, Nontender, Nondistended; Bowel sounds present  EXTREMITIES:  Much improved b/l UE tremor   PSYCH: AAOx3  NEUROLOGY: non-focal  SKIN: No rashes or lesions    LABS:                        12.3   5.11  )-----------( 239      ( 22 Aug 2017 07:47 )             35.8     08-23    136  |  97  |  5<L>  ----------------------------<  71  3.7   |  21<L>  |  0.51    Ca    8.9      23 Aug 2017 08:46  Phos  4.1     08-23  Mg     1.9     08-23    TPro  7.3  /  Alb  4.3  /  TBili  1.2  /  DBili  x   /  AST  220<H>  /  ALT  164<H>  /  AlkPhos  109  08-23 (downtrending)    PT/INR - ( 21 Aug 2017 21:24 )   PT: 9.9 sec;   INR: 0.92 ratio         PTT - ( 21 Aug 2017 21:24 )  PTT:30.9 sec      Urinalysis Basic - ( 21 Aug 2017 23:01 )    Color: x / Appearance: Clear / S.008 / pH: x  Gluc: x / Ketone: Negative  / Bili: Negative / Urobili: Negative   Blood: x / Protein: Negative / Nitrite: Negative   Leuk Esterase: Negative / RBC: 0-2 /HPF / WBC 3-5 /HPF   Sq Epi: x / Non Sq Epi: OCC /HPF / Bacteria: Few /HPF        Consultant(s) Notes Reviewed:  Psych    Care Discussed with Consultants/Other Providers:

## 2017-08-23 NOTE — PROVIDER CONTACT NOTE (OTHER) - ACTION/TREATMENT ORDERED:
2 CIWA order. 1 general CIWA, 1 high CIWA. no intervention at time. per provider, replace nicotine patch with a new patch. Pt needs continuous patch. no orders at time

## 2017-08-23 NOTE — PROVIDER CONTACT NOTE (OTHER) - BACKGROUND
pt admitted for ETOH abuse.  hx of insomnia and depression. pt admitted for ETOH abuse.  hx of insomnia and depression. hx of smoking.

## 2017-08-23 NOTE — PROVIDER CONTACT NOTE (OTHER) - ASSESSMENT
pt axox4. HR 99 apically. pt states she is moderately anxious. RN gave ativan po per orders as 2 point increase.

## 2017-08-23 NOTE — PROGRESS NOTE ADULT - PROBLEM SELECTOR PLAN 1
feels better overall, CIWA 2-5, requiring 2 doses of ativan overnight  - c/w symptom trigger ativan 2mg PRN for CIWA > 8 or >2 increase in CIWA  -will discuss with psych if can start naltrexone  -c/w thiamine and folate  -fall risk  -electrolytes wnl

## 2017-08-24 ENCOUNTER — TRANSCRIPTION ENCOUNTER (OUTPATIENT)
Age: 27
End: 2017-08-24

## 2017-08-24 VITALS
HEART RATE: 105 BPM | OXYGEN SATURATION: 97 % | DIASTOLIC BLOOD PRESSURE: 91 MMHG | SYSTOLIC BLOOD PRESSURE: 124 MMHG | RESPIRATION RATE: 18 BRPM | TEMPERATURE: 98 F

## 2017-08-24 PROCEDURE — 99285 EMERGENCY DEPT VISIT HI MDM: CPT | Mod: 25

## 2017-08-24 PROCEDURE — 93005 ELECTROCARDIOGRAM TRACING: CPT

## 2017-08-24 PROCEDURE — 80307 DRUG TEST PRSMV CHEM ANLYZR: CPT

## 2017-08-24 PROCEDURE — 80053 COMPREHEN METABOLIC PANEL: CPT

## 2017-08-24 PROCEDURE — 85610 PROTHROMBIN TIME: CPT

## 2017-08-24 PROCEDURE — 76700 US EXAM ABDOM COMPLETE: CPT

## 2017-08-24 PROCEDURE — 83735 ASSAY OF MAGNESIUM: CPT

## 2017-08-24 PROCEDURE — 85027 COMPLETE CBC AUTOMATED: CPT

## 2017-08-24 PROCEDURE — 96374 THER/PROPH/DIAG INJ IV PUSH: CPT

## 2017-08-24 PROCEDURE — 99239 HOSP IP/OBS DSCHRG MGMT >30: CPT

## 2017-08-24 PROCEDURE — 85730 THROMBOPLASTIN TIME PARTIAL: CPT

## 2017-08-24 PROCEDURE — 81001 URINALYSIS AUTO W/SCOPE: CPT

## 2017-08-24 PROCEDURE — 84100 ASSAY OF PHOSPHORUS: CPT

## 2017-08-24 RX ORDER — TRAZODONE HCL 50 MG
1 TABLET ORAL
Qty: 15 | Refills: 0 | OUTPATIENT
Start: 2017-08-24

## 2017-08-24 RX ORDER — NICOTINE POLACRILEX 2 MG
1 GUM BUCCAL
Qty: 30 | Refills: 0 | OUTPATIENT
Start: 2017-08-24 | End: 2017-09-23

## 2017-08-24 RX ORDER — FOLIC ACID 0.8 MG
1 TABLET ORAL
Qty: 0 | Refills: 0 | COMMUNITY
Start: 2017-08-24

## 2017-08-24 RX ORDER — TRAZODONE HCL 50 MG
1 TABLET ORAL
Qty: 0 | Refills: 0 | COMMUNITY
Start: 2017-08-24

## 2017-08-24 RX ORDER — SERTRALINE 25 MG/1
3 TABLET, FILM COATED ORAL
Qty: 0 | Refills: 0 | COMMUNITY
Start: 2017-08-24

## 2017-08-24 RX ORDER — SERTRALINE 25 MG/1
1 TABLET, FILM COATED ORAL
Qty: 0 | Refills: 0 | COMMUNITY

## 2017-08-24 RX ORDER — NICOTINE POLACRILEX 2 MG
1 GUM BUCCAL
Qty: 0 | Refills: 0 | COMMUNITY
Start: 2017-08-24

## 2017-08-24 RX ORDER — THIAMINE MONONITRATE (VIT B1) 100 MG
1 TABLET ORAL
Qty: 0 | Refills: 0 | COMMUNITY
Start: 2017-08-24

## 2017-08-24 RX ORDER — SERTRALINE 25 MG/1
3 TABLET, FILM COATED ORAL
Qty: 90 | Refills: 0 | OUTPATIENT
Start: 2017-08-24 | End: 2017-09-23

## 2017-08-24 RX ADMIN — Medication 2 MILLIGRAM(S): at 13:11

## 2017-08-24 RX ADMIN — Medication 2 MILLIGRAM(S): at 00:19

## 2017-08-24 RX ADMIN — Medication 1 PATCH: at 06:00

## 2017-08-24 RX ADMIN — Medication 1 MILLIGRAM(S): at 11:59

## 2017-08-24 RX ADMIN — SERTRALINE 75 MILLIGRAM(S): 25 TABLET, FILM COATED ORAL at 11:58

## 2017-08-24 RX ADMIN — ENOXAPARIN SODIUM 40 MILLIGRAM(S): 100 INJECTION SUBCUTANEOUS at 11:59

## 2017-08-24 RX ADMIN — Medication 100 MILLIGRAM(S): at 11:58

## 2017-08-24 NOTE — PROGRESS NOTE ADULT - ATTENDING COMMENTS
Discharge home today with mariela f/u at [facility] if patient wishes  discharge time 40 minutes

## 2017-08-24 NOTE — CHART NOTE - NSCHARTNOTEFT_GEN_A_CORE
Asked by attending MD to discharge pt home; discussed discharge medication and follow up; discussed with ; f/u outpatient psych and outpatient alcohol rehab;  Isabel Lima(NP)  [address], [phone] /[phone]

## 2017-08-24 NOTE — PROGRESS NOTE ADULT - PROBLEM SELECTOR PLAN 5
c/w home med trazadone 50mg qhs prn

## 2017-08-24 NOTE — DISCHARGE NOTE ADULT - CARE PLAN
Principal Discharge DX:	Alcohol abuse  Secondary Diagnosis:	Depression  Secondary Diagnosis:	Elevated liver enzymes  Secondary Diagnosis:	Insomnia, unspecified type Principal Discharge DX:	Alcohol withdrawal  Goal:	improved symptoms  Instructions for follow-up, activity and diet:	outpatient alcohol rehab  Secondary Diagnosis:	Depression  Instructions for follow-up, activity and diet:	continue current medication  follow up with outpatient psychiatry  Secondary Diagnosis:	Elevated liver enzymes  Instructions for follow-up, activity and diet:	improved  Secondary Diagnosis:	Insomnia, unspecified type  Instructions for follow-up, activity and diet:	continue medication as prescribed  Secondary Diagnosis:	Tobacco use disorder  Instructions for follow-up, activity and diet:	continue nicotine patch as prescribed

## 2017-08-24 NOTE — PROGRESS NOTE ADULT - SUBJECTIVE AND OBJECTIVE BOX
Patient is a 27y old  Female who presents with a chief complaint of alcohol withdrawal (22 Aug 2017 00:55)      SUBJECTIVE / OVERNIGHT EVENTS:  Felt anxious last night but improved with trazadone. This morning no anxiety want to go home. Denies tremors and hallucitations. Wants to go home with methylnaltrexone.     MEDICATIONS  (STANDING):  nicotine - 21 mG/24Hr(s) Patch 1 patch Transdermal daily  thiamine 100 milliGRAM(s) Oral daily  folic acid 1 milliGRAM(s) Oral daily  sertraline 75 milliGRAM(s) Oral daily  enoxaparin Injectable 40 milliGRAM(s) SubCutaneous daily    MEDICATIONS  (PRN):  LORazepam     Tablet 2 milliGRAM(s) Oral every 2 hours PRN Symptom-triggered 2 point increase in CIWA-Ar  LORazepam   Injectable 2 milliGRAM(s) IV Push every 1 hour PRN Symptom-triggered: each CIWA -Ar score 8 or GREATER  traZODone 50 milliGRAM(s) Oral at bedtime PRN insomnia      Vital Signs Last 24 Hrs  T(C): 36.7 (24 Aug 2017 09:49), Max: 36.9 (23 Aug 2017 17:30)  T(F): 98.1 (24 Aug 2017 09:49), Max: 98.5 (23 Aug 2017 17:30)  HR: 102 (24 Aug 2017 09:49) (74 - 102)  BP: 124/86 (24 Aug 2017 09:49) (119/88 - 136/97)  BP(mean): --  RR: 18 (24 Aug 2017 05:51) (18 - 18)  SpO2: 98% (24 Aug 2017 05:51) (97% - 99%)  CAPILLARY BLOOD GLUCOSE        I&O's Summary    23 Aug 2017 07:01  -  24 Aug 2017 07:00  --------------------------------------------------------  IN: 1600 mL / OUT: 1 mL / NET: 1599 mL        PHYSICAL EXAM:  GENERAL: NAD, well-developed  HEAD:  Atraumatic, Normocephalic  EYES: EOMI, PERRLA, conjunctiva and sclera clear  NECK: Supple, No JVD  CHEST/LUNG: Clear to auscultation bilaterally; No wheeze  HEART: Regular rate and rhythm; No murmurs, rubs, or gallops  ABDOMEN: Soft, Nontender, Nondistended; Bowel sounds present  EXTREMITIES:  2+ Peripheral Pulses, No clubbing, cyanosis, or edema. No tremor  PSYCH: AAOx3  NEUROLOGY: non-focal  SKIN: No rashes or lesions    LABS:    08-23    136  |  97  |  5<L>  ----------------------------<  71  3.7   |  21<L>  |  0.51    Ca    8.9      23 Aug 2017 08:46  Phos  4.1     08-23  Mg     1.9     08-23    TPro  7.3  /  Alb  4.3  /  TBili  1.2  /  DBili  x   /  AST  220<H>  /  ALT  164<H>  /  AlkPhos  109  08-23              RADIOLOGY & ADDITIONAL TESTS:    Imaging Personally Reviewed:    Consultant(s) Notes Reviewed:      Care Discussed with Consultants/Other Providers: Patient is a 27y old  Female who presents with a chief complaint of alcohol withdrawal (22 Aug 2017 00:55)      SUBJECTIVE / OVERNIGHT EVENTS:  Felt anxious last night but improved with trazadone. This morning no anxiety want to go home. Denies tremors and hallucitations. Wants to go home with naltrexone.     MEDICATIONS  (STANDING):  nicotine - 21 mG/24Hr(s) Patch 1 patch Transdermal daily  thiamine 100 milliGRAM(s) Oral daily  folic acid 1 milliGRAM(s) Oral daily  sertraline 75 milliGRAM(s) Oral daily  enoxaparin Injectable 40 milliGRAM(s) SubCutaneous daily    MEDICATIONS  (PRN):  LORazepam     Tablet 2 milliGRAM(s) Oral every 2 hours PRN Symptom-triggered 2 point increase in CIWA-Ar  LORazepam   Injectable 2 milliGRAM(s) IV Push every 1 hour PRN Symptom-triggered: each CIWA -Ar score 8 or GREATER  traZODone 50 milliGRAM(s) Oral at bedtime PRN insomnia      Vital Signs Last 24 Hrs  T(C): 36.7 (24 Aug 2017 09:49), Max: 36.9 (23 Aug 2017 17:30)  T(F): 98.1 (24 Aug 2017 09:49), Max: 98.5 (23 Aug 2017 17:30)  HR: 102 (24 Aug 2017 09:49) (74 - 102)  BP: 124/86 (24 Aug 2017 09:49) (119/88 - 136/97)  BP(mean): --  RR: 18 (24 Aug 2017 05:51) (18 - 18)  SpO2: 98% (24 Aug 2017 05:51) (97% - 99%)  CAPILLARY BLOOD GLUCOSE        I&O's Summary    23 Aug 2017 07:01  -  24 Aug 2017 07:00  --------------------------------------------------------  IN: 1600 mL / OUT: 1 mL / NET: 1599 mL        PHYSICAL EXAM:  GENERAL: NAD, well-developed  HEAD:  Atraumatic, Normocephalic  EYES: EOMI, PERRLA, conjunctiva and sclera clear  NECK: Supple, No JVD  CHEST/LUNG: Clear to auscultation bilaterally; No wheeze  HEART: Regular rate and rhythm; No murmurs, rubs, or gallops  ABDOMEN: Soft, Nontender, Nondistended; Bowel sounds present  EXTREMITIES:  2+ Peripheral Pulses, No clubbing, cyanosis, or edema. No tremor  PSYCH: AAOx3  NEUROLOGY: non-focal  SKIN: No rashes or lesions    LABS:    08-23    136  |  97  |  5<L>  ----------------------------<  71  3.7   |  21<L>  |  0.51    Ca    8.9      23 Aug 2017 08:46  Phos  4.1     08-23  Mg     1.9     08-23    TPro  7.3  /  Alb  4.3  /  TBili  1.2  /  DBili  x   /  AST  220<H>  /  ALT  164<H>  /  AlkPhos  109  08-23                Care Discussed with Consultants/Other Providers: Dr Vilchis psych, ok to discharge given minimal ativan requirements, can c/w zoloft 75mg and trazadone 50mg qhs prn, cannot start naltrexone now, must follow up as outpatient first.

## 2017-08-24 NOTE — DISCHARGE NOTE ADULT - MEDICATION SUMMARY - MEDICATIONS TO CHANGE
I will SWITCH the dose or number of times a day I take the medications listed below when I get home from the hospital:    sertraline 50 mg oral tablet  -- 1 tab(s) by mouth once a day  -- It is very important that you take or use this exactly as directed.  Do not skip doses or discontinue unless directed by your doctor.  May cause drowsiness.  Alcohol may intensify this effect.  Use care when operating dangerous machinery.  Obtain medical advice before taking any non-prescription drugs as some may affect the action of this medication.    sertraline 50 mg oral tablet  -- 1 tab(s) by mouth once a day

## 2017-08-24 NOTE — DISCHARGE NOTE ADULT - MEDICATION SUMMARY - MEDICATIONS TO TAKE
I will START or STAY ON the medications listed below when I get home from the hospital:    traZODone 50 mg oral tablet  -- 1 tab(s) by mouth once a day (at bedtime), As needed, insomnia  -- Indication: For Insomnia, unspecified type    sertraline 25 mg oral tablet  -- 3 tab(s) by mouth once a day  -- Indication: For Depression    nicotine 21 mg/24 hr transdermal film, extended release  -- 1 patch by transdermal patch once a day  -- Indication: For Tobacco use disorder    multivitamin  -- 1 tab(s) by mouth once a day  -- Indication: For vitamin    folic acid 1 mg oral tablet  -- 1 tab(s) by mouth once a day  -- Indication: For vitamin    thiamine 100 mg oral tablet  -- 1 tab(s) by mouth once a day  -- Indication: For vitamin

## 2017-08-24 NOTE — PROGRESS NOTE ADULT - PROBLEM SELECTOR PLAN 1
improved, CIWA  0-3, most recently 0-1, has not required ativan since midnight  - c/w symptom trigger ativan 2mg PRN for CIWA > 8 or >2 increase in CIWA  -will discuss with psych if can start naltrexone on discharge, awaiting recs  -c/w thiamine and folate  -fall risk  -electrolytes wnl improved, CIWA  0-3, most recently 0-1, has not required ativan since midnight  - c/w symptom trigger ativan 2mg PRN for CIWA > 8 or >2 increase in CIWA  -per discussion with Psych, Dr Vilchis can discharge today as minimal ativan requirements with CIWA 0-1, cannot give naltrexone on discharge, must follow up as outpatinet   c/w thiamine and folate  -fall risk  -electrolytes wnl

## 2017-08-24 NOTE — DISCHARGE NOTE ADULT - PATIENT PORTAL LINK FT
“You can access the [facility] Patient Portal, offered by [facility], by registering with the following website: [email or web address]”

## 2017-08-24 NOTE — DISCHARGE NOTE ADULT - HOSPITAL COURSE
to be completed by md per HPI: "Patient is a 27 year old female w/ pmh anxiety, depression, alcohol abuse, polysubstance abuse (cocaine, xanax) presents with alcohol withdrawal. Patient reports last drink was about 8 hours prior to admission, she drinks beer and wine , about 12 pack daily for about three months , Has had w/d hallucinations in past , no seizures. Never required ICU level care.  She currently reports nausea, abdominal pain in the epigastric area non radiating, no relievingor eacerbating factors , b/l  tremors, No hallucinations. She was last admitted in March 2017 and treated with zoloft and trazadone with a good response , but relapsed once her medication ran out. She reports feeling depressed with poor sleep and appetites, psychomotor retardation, passive suicidal ideation, no active suicidal ideation. She underwent detox twice in past with temporary response. She is currently interested in detox. "  Hospital Course:  Patient was admitted for alcohol withdrawal Psychiatry was consulted and rec, ativan prn, and increasing her zoloft and restarting her trazadone for sleep. She was monitored on CIWA protocol and given ativan prn. Her CIWA score remained low and she became asymptomatic asking to go home after several days.  She remained HD stable. LFT were elevated on admission but downtrended likely due to her alcohol intake. She used nicotine patch while in hospital to curb her smoking. Her asked for naltrexone on discharge but her psych this must be started as outpatient. She did not want to go to rehab and will follow up psych as outpatient    Discharge Diagnosis:  Alcohol Withdrawal  Polysubstance abuse  Depression  Elevated liver enzymes  Insomnia  Tobacco use disorder

## 2017-08-24 NOTE — DISCHARGE NOTE ADULT - PLAN OF CARE
improved symptoms continue current medication  follow up with outpatient psychiatry outpatient alcohol rehab improved continue medication as prescribed continue nicotine patch as prescribed

## 2018-05-20 ENCOUNTER — INPATIENT (INPATIENT)
Facility: HOSPITAL | Age: 28
LOS: 3 days | Discharge: HOME | DRG: 896 | End: 2018-05-24
Attending: HOSPITALIST | Admitting: HOSPITALIST
Payer: MEDICAID

## 2018-05-20 VITALS
DIASTOLIC BLOOD PRESSURE: 107 MMHG | SYSTOLIC BLOOD PRESSURE: 142 MMHG | HEART RATE: 105 BPM | TEMPERATURE: 98 F | OXYGEN SATURATION: 98 % | RESPIRATION RATE: 20 BRPM

## 2018-05-20 DIAGNOSIS — F10.239 ALCOHOL DEPENDENCE WITH WITHDRAWAL, UNSPECIFIED: ICD-10-CM

## 2018-05-20 DIAGNOSIS — Z29.9 ENCOUNTER FOR PROPHYLACTIC MEASURES, UNSPECIFIED: ICD-10-CM

## 2018-05-20 DIAGNOSIS — F32.9 MAJOR DEPRESSIVE DISORDER, SINGLE EPISODE, UNSPECIFIED: ICD-10-CM

## 2018-05-20 DIAGNOSIS — F10.10 ALCOHOL ABUSE, UNCOMPLICATED: ICD-10-CM

## 2018-05-20 DIAGNOSIS — K92.0 HEMATEMESIS: ICD-10-CM

## 2018-05-20 LAB
ALBUMIN SERPL ELPH-MCNC: 5.4 G/DL — HIGH (ref 3.3–5)
ALP SERPL-CCNC: 84 U/L — SIGNIFICANT CHANGE UP (ref 40–120)
ALT FLD-CCNC: 47 U/L — HIGH (ref 10–45)
ANION GAP SERPL CALC-SCNC: 21 MMOL/L — HIGH (ref 5–17)
APAP SERPL-MCNC: <15 UG/ML — SIGNIFICANT CHANGE UP (ref 10–30)
APTT BLD: 29.6 SEC — SIGNIFICANT CHANGE UP (ref 27.5–37.4)
AST SERPL-CCNC: 77 U/L — HIGH (ref 10–40)
BASOPHILS # BLD AUTO: 0 K/UL — SIGNIFICANT CHANGE UP (ref 0–0.2)
BASOPHILS NFR BLD AUTO: 0.3 % — SIGNIFICANT CHANGE UP (ref 0–2)
BILIRUB SERPL-MCNC: 0.3 MG/DL — SIGNIFICANT CHANGE UP (ref 0.2–1.2)
BLD GP AB SCN SERPL QL: NEGATIVE — SIGNIFICANT CHANGE UP
BUN SERPL-MCNC: 8 MG/DL — SIGNIFICANT CHANGE UP (ref 7–23)
CALCIUM SERPL-MCNC: 9.2 MG/DL — SIGNIFICANT CHANGE UP (ref 8.4–10.5)
CHLORIDE SERPL-SCNC: 98 MMOL/L — SIGNIFICANT CHANGE UP (ref 96–108)
CO2 SERPL-SCNC: 23 MMOL/L — SIGNIFICANT CHANGE UP (ref 22–31)
CREAT SERPL-MCNC: 0.55 MG/DL — SIGNIFICANT CHANGE UP (ref 0.5–1.3)
EOSINOPHIL # BLD AUTO: 0 K/UL — SIGNIFICANT CHANGE UP (ref 0–0.5)
EOSINOPHIL NFR BLD AUTO: 0.8 % — SIGNIFICANT CHANGE UP (ref 0–6)
ETHANOL SERPL-MCNC: 259 MG/DL — HIGH (ref 0–10)
GLUCOSE SERPL-MCNC: 104 MG/DL — HIGH (ref 70–99)
HCG SERPL QL: NEGATIVE — SIGNIFICANT CHANGE UP
HCT VFR BLD CALC: 42.8 % — SIGNIFICANT CHANGE UP (ref 34.5–45)
HGB BLD-MCNC: 14.9 G/DL — SIGNIFICANT CHANGE UP (ref 11.5–15.5)
HIV 1 & 2 AB SERPL IA.RAPID: SIGNIFICANT CHANGE UP
INR BLD: 1.1 RATIO — SIGNIFICANT CHANGE UP (ref 0.88–1.16)
LIDOCAIN IGE QN: 61 U/L — HIGH (ref 7–60)
LYMPHOCYTES # BLD AUTO: 1.2 K/UL — SIGNIFICANT CHANGE UP (ref 1–3.3)
LYMPHOCYTES # BLD AUTO: 21.3 % — SIGNIFICANT CHANGE UP (ref 13–44)
MAGNESIUM SERPL-MCNC: 1.9 MG/DL — SIGNIFICANT CHANGE UP (ref 1.6–2.6)
MCHC RBC-ENTMCNC: 33.8 PG — SIGNIFICANT CHANGE UP (ref 27–34)
MCHC RBC-ENTMCNC: 34.7 GM/DL — SIGNIFICANT CHANGE UP (ref 32–36)
MCV RBC AUTO: 97.2 FL — SIGNIFICANT CHANGE UP (ref 80–100)
MONOCYTES # BLD AUTO: 0.6 K/UL — SIGNIFICANT CHANGE UP (ref 0–0.9)
MONOCYTES NFR BLD AUTO: 10.8 % — SIGNIFICANT CHANGE UP (ref 2–14)
NEUTROPHILS # BLD AUTO: 3.7 K/UL — SIGNIFICANT CHANGE UP (ref 1.8–7.4)
NEUTROPHILS NFR BLD AUTO: 66.9 % — SIGNIFICANT CHANGE UP (ref 43–77)
PHOSPHATE SERPL-MCNC: 3 MG/DL — SIGNIFICANT CHANGE UP (ref 2.5–4.5)
PLATELET # BLD AUTO: 275 K/UL — SIGNIFICANT CHANGE UP (ref 150–400)
POTASSIUM SERPL-MCNC: 3.9 MMOL/L — SIGNIFICANT CHANGE UP (ref 3.5–5.3)
POTASSIUM SERPL-SCNC: 3.9 MMOL/L — SIGNIFICANT CHANGE UP (ref 3.5–5.3)
PROT SERPL-MCNC: 8.6 G/DL — HIGH (ref 6–8.3)
PROTHROM AB SERPL-ACNC: 12 SEC — SIGNIFICANT CHANGE UP (ref 9.8–12.7)
RBC # BLD: 4.4 M/UL — SIGNIFICANT CHANGE UP (ref 3.8–5.2)
RBC # FLD: 11.5 % — SIGNIFICANT CHANGE UP (ref 10.3–14.5)
RH IG SCN BLD-IMP: POSITIVE — SIGNIFICANT CHANGE UP
RH IG SCN BLD-IMP: POSITIVE — SIGNIFICANT CHANGE UP
SALICYLATES SERPL-MCNC: <2 MG/DL — LOW (ref 15–30)
SODIUM SERPL-SCNC: 142 MMOL/L — SIGNIFICANT CHANGE UP (ref 135–145)
WBC # BLD: 5.6 K/UL — SIGNIFICANT CHANGE UP (ref 3.8–10.5)
WBC # FLD AUTO: 5.6 K/UL — SIGNIFICANT CHANGE UP (ref 3.8–10.5)

## 2018-05-20 PROCEDURE — 99284 EMERGENCY DEPT VISIT MOD MDM: CPT

## 2018-05-20 PROCEDURE — 99223 1ST HOSP IP/OBS HIGH 75: CPT | Mod: AI,GC

## 2018-05-20 PROCEDURE — 71046 X-RAY EXAM CHEST 2 VIEWS: CPT | Mod: 26

## 2018-05-20 RX ORDER — NICOTINE POLACRILEX 2 MG
1 GUM BUCCAL DAILY
Qty: 0 | Refills: 0 | Status: DISCONTINUED | OUTPATIENT
Start: 2018-05-20 | End: 2018-05-24

## 2018-05-20 RX ORDER — ONDANSETRON 8 MG/1
4 TABLET, FILM COATED ORAL EVERY 6 HOURS
Qty: 0 | Refills: 0 | Status: DISCONTINUED | OUTPATIENT
Start: 2018-05-20 | End: 2018-05-24

## 2018-05-20 RX ORDER — TRAZODONE HCL 50 MG
50 TABLET ORAL AT BEDTIME
Qty: 0 | Refills: 0 | Status: DISCONTINUED | OUTPATIENT
Start: 2018-05-20 | End: 2018-05-24

## 2018-05-20 RX ORDER — SERTRALINE 25 MG/1
1 TABLET, FILM COATED ORAL
Qty: 0 | Refills: 0 | COMMUNITY

## 2018-05-20 RX ORDER — SODIUM CHLORIDE 9 MG/ML
1000 INJECTION INTRAMUSCULAR; INTRAVENOUS; SUBCUTANEOUS ONCE
Qty: 0 | Refills: 0 | Status: COMPLETED | OUTPATIENT
Start: 2018-05-20 | End: 2018-05-20

## 2018-05-20 RX ORDER — SODIUM CHLORIDE 9 MG/ML
1000 INJECTION, SOLUTION INTRAVENOUS
Qty: 0 | Refills: 0 | Status: COMPLETED | OUTPATIENT
Start: 2018-05-20 | End: 2018-05-20

## 2018-05-20 RX ORDER — FOLIC ACID 0.8 MG
1 TABLET ORAL DAILY
Qty: 0 | Refills: 0 | Status: DISCONTINUED | OUTPATIENT
Start: 2018-05-20 | End: 2018-05-24

## 2018-05-20 RX ORDER — PANTOPRAZOLE SODIUM 20 MG/1
40 TABLET, DELAYED RELEASE ORAL
Qty: 0 | Refills: 0 | Status: DISCONTINUED | OUTPATIENT
Start: 2018-05-20 | End: 2018-05-24

## 2018-05-20 RX ORDER — THIAMINE MONONITRATE (VIT B1) 100 MG
500 TABLET ORAL DAILY
Qty: 0 | Refills: 0 | Status: COMPLETED | OUTPATIENT
Start: 2018-05-21 | End: 2018-05-23

## 2018-05-20 RX ADMIN — PANTOPRAZOLE SODIUM 40 MILLIGRAM(S): 20 TABLET, DELAYED RELEASE ORAL at 21:21

## 2018-05-20 RX ADMIN — SODIUM CHLORIDE 2000 MILLILITER(S): 9 INJECTION INTRAMUSCULAR; INTRAVENOUS; SUBCUTANEOUS at 17:53

## 2018-05-20 RX ADMIN — Medication 2 MILLIGRAM(S): at 21:21

## 2018-05-20 RX ADMIN — Medication 50 MILLIGRAM(S): at 23:01

## 2018-05-20 RX ADMIN — SODIUM CHLORIDE 100 MILLILITER(S): 9 INJECTION, SOLUTION INTRAVENOUS at 23:01

## 2018-05-20 RX ADMIN — Medication 1 PATCH: at 19:01

## 2018-05-20 RX ADMIN — Medication 2 MILLIGRAM(S): at 17:53

## 2018-05-20 NOTE — H&P ADULT - NSHPPHYSICALEXAM_GEN_ALL_CORE
T(C): 37.1 (05-20-18 @ 19:40), Max: 37.1 (05-20-18 @ 19:40)  HR: 94 (05-20-18 @ 19:40) (94 - 110)  BP: 151/75 (05-20-18 @ 19:40) (137/104 - 151/75)  RR: 20 (05-20-18 @ 19:40) (20 - 20)  SpO2: 98% (05-20-18 @ 19:40) (96% - 98%)  Wt(kg): --  GENERAL: NAD, well-developed  HEAD:  Atraumatic, Normocephalic  EYES: EOMI, PERRLA, conjunctiva and sclera clear  NECK: Supple, No JVD  CHEST/LUNG: Clear to auscultation bilaterally; No wheeze  HEART: Regular rate and rhythm; No murmurs, rubs, or gallops  ABDOMEN: Soft, Nontender, Nondistended; Bowel sounds present  EXTREMITIES:  2+ Peripheral Pulses, No clubbing, cyanosis, or edema  PSYCH: AAOx3, non-tremulous.  NEUROLOGY: non-focal  SKIN: No rashes or lesions

## 2018-05-20 NOTE — H&P ADULT - PROBLEM SELECTOR PLAN 2
- Chronic hx of alcohol abuse. Currently daily drinker. Alcohol on admission 259 likely remain intoxicated overnight.  - [facility] symptomatic protocol ordred to prevent withdraw sx.   - Tachycardia resolved after 1L bolus. C/w banana bag 1L at 100cc/h overnight.  - Replete thiamine, folate and MV.  - SW consulted for detox resources.

## 2018-05-20 NOTE — H&P ADULT - PROBLEM SELECTOR PLAN 1
- Patient reported vomiting blood. Likely from upper GI source.   - Currently HD stable no acute distress. Will check CBC q12h.  - Make sure patient has two large bore IV.  - No hx of cirrhosis. Low likelihood variceal bleeding.   - Pantoprazole 40mg IVP BID ordered.  - Consult GI in the am.

## 2018-05-20 NOTE — H&P ADULT - PROBLEM SELECTOR PLAN 3
- Hx of depression, currently mood stable without SI/HI. Not on medication and not f/u with psychiatry yet after previous admission.  - Will restart trazodone as patient tolerated well with sx control.   - Monitor QTc in the am (now at 480 on admission).  - Will consult psychiatry in the am for future f/u plan.

## 2018-05-20 NOTE — H&P ADULT - ATTENDING COMMENTS
Attending addendum:  Patient seen and examined, I agree with the above assessment and plan with any changes indicated below.     Maggy Humphrey is a 27 year old female with a history of depression, polysubstance abuse, current alcohol use who presents for evaluation of bloody emesis. Reports drinking ~20 beers/day along with occasional alcohol use, denies any other drugs. Today, had two episodes of bloody emesis reporting about half a cup full, no further emesis since arrival to hospital. Has not had similar issue in the past, drank listerene prior to coming to the hospital. Vitals notable for tachycardia however exam otherwise unremarkable. Labs with stable CBC, normal platelets, BUN 8 with normal BMP, albumin 5.4. Last known liver US 1 year ago with steatosis, no cirrhosis. Admitted for hematemesis and alcohol withdrawal.    Hematemesis unlikely variceal bleed given lab markers without evidence of cirrhosis (normal platelets, albumin), 3 year history of alcohol abuse, normal BUN, and recent US without cirrhosis. Likely gastritis vs flavia magdaleno vs ulcer. Will continue with PPI, check CBC at midnight, and monitor overnight. GI consult in AM or soon if any further hematemesis or vital sign instability. Treatment for alcohol withdrawal with prn Ativan dosing. Check urine tox. Social work consult.    Remainder of plan as above.

## 2018-05-20 NOTE — ED ADULT NURSE REASSESSMENT NOTE - NS ED NURSE REASSESS COMMENT FT1
Pt AAOx4, NAD, resp nonlabored, resting comfortably in bed. Pt reports some improvement in anxiety, now 5/10 chest pressure, nausea. No SOB. Pt denies headache, dizziness, chest pain, palpitations, abd pain, v/d, fevers, chills at this time. Pt awaiting transport to floor. Safety maintained. Pt AAOx4, NAD, resp nonlabored, resting comfortably in bed. Pt reports some improvement in anxiety, now 5/10 chest pressure, nausea. No SOB. Pt denies headache, dizziness, chest pain, palpitations, abd pain, v/d, fevers, chills at this time. No SI/HI/AVH. Pt is calm/cooperative at this time. Pt awaiting transport to floor. Safety maintained.

## 2018-05-20 NOTE — ED PROVIDER NOTE - ATTENDING CONTRIBUTION TO CARE
Private Physician Pinky, N [phone]  27y female pmh etoh, alcohol withdrawal in past with tactile hallucinations, admit 8/2017, Pt employed as [employer], dm,HLd,HTN, Last [place] drug cocaine 2y ago. PT had large amount two days ago none yesterday but drank bottle of Listerene. Vomited today. no coffee grounds. No fever chills. Multiple vomiting. PE NCAT chest clear anterior & posterior abd soft +bs, no mass guarding, neuro gcs 15 speech fluent power 55. all extr Mild tremor, +brisk reflexes  Carlos Alberto Cloud MD, Facep

## 2018-05-20 NOTE — ED PROVIDER NOTE - OBJECTIVE STATEMENT
26 y/o female hx of EtOH abuse presents with hematemesis. Patient states that she drank 20 beers a day, did this on Friday, but yesterday she drank 1/2 bottle of large Listerine. Today, few episodes of emesis of bright red blood, no clots. No abd pain, f/c, CP, SOB, N/V/D. Feels anxious. Denies other drugs, used to do cocaine, but no longer. Interested in quitting drinking. No hx of DT or seizures, but has hallucinated in the past.

## 2018-05-20 NOTE — ED ADULT NURSE NOTE - OBJECTIVE STATEMENT
29 y/o 28 y/o F, PMH depression, anxiety, presents to ED stating "I'm going to withdraw from alcohol." Pt has withdrawn in the past and reports her sx on day 2 have been visual hallucinations, tactile sensations, severe anxiety and insomnia, pt has never had seizures or DTs. Pt last withdrew in August here, the time prior to that she went to a Rehab program but did not complete it. Pt reports this is the first time she wants to quit drinking. Pt last drank 20 beers Friday and 1/1 a family sized bottle of Listerine yesterday, all day. Pt woke up this morning vomiting blood, with diarrhea, anxiety with 10/10 chest pressure and SOB. Pt last used cocaine 2 years ago, smokes 1 pack of cigarettes/day. 26 y/o F, PMH depression, anxiety, presents to ED stating "I'm going to withdraw from alcohol." Pt has withdrawn in the past and reports her sx on day 2 have been visual hallucinations, tactile sensations, severe anxiety and insomnia, pt has never had seizures or DTs. Pt last withdrew in August here, the time prior to that she went to a Rehab program but did not complete it. Pt reports this is the first time she wants to quit drinking. Pt last drank 20 beers Friday and 1/2 a family sized bottle of Listerine yesterday, all day. Pt woke up this morning vomiting blood, with diarrhea, anxiety with 10/10 chest pressure and SOB. Pt last used cocaine 2 years ago, smokes 1 pack of cigarettes/day. Pt currently denies SI/HI/AVH, tactile sensations. Safety maintained.

## 2018-05-20 NOTE — H&P ADULT - HISTORY OF PRESENT ILLNESS
26yo F w/ hx of depression, anxiety, substance abuse(xanax, cocaine), alcohol abuse/withdraw (no ICU, seizure hx) presented with hematemesis. Patient reported daily 20 beer intake. Last drink Friday. However, she had half a bottle of large Listerine yesterday. This morning, patient vomited bright red blood x 2 and presented to ER. Patient otherwise denied sob, cp, abd pain, n/v/d/c, f/c, dysuria or hallucination. With regard to alcohol history, patient has had recurrent hospital admission for withdraw. Underwent detox x 2 and wanting quitting alcohol again at time of interview. No hx of GIB and only steatosis on 08/2017 US.    In the ER, patient was noted to have mild tachy 100s, HD stable and no resp distress with CIWA 5. Given 1L bolus and ativan 2mg x 2.

## 2018-05-20 NOTE — ED PROVIDER NOTE - MEDICAL DECISION MAKING DETAILS
Yakov Cotto (Resident): 26 y/o EtOH abuse presents with hematemesis after drinking Listerine yesterday. Looks well, few episodes of vomit - tachy either 2/2 to the impending EtOH withdrawal - abd soft at this time, no pain, low suspicion for actively bleeding ulcer, likely a gastritis - will check labs, tox, CIWA, and admit

## 2018-05-20 NOTE — H&P ADULT - NSHPREVIEWOFSYSTEMS_GEN_ALL_CORE
REVIEW OF SYSTEMS:    CONSTITUTIONAL: Feels week  EYES/ENT: No visual changes;  No vertigo or throat pain   NECK: No pain or stiffness  RESPIRATORY: No cough, wheezing, hemoptysis; No shortness of breath  CARDIOVASCULAR: No chest pain or palpitations  GASTROINTESTINAL: Vomiting blood x 2 today.  GENITOURINARY: No dysuria, frequency or hematuria  NEUROLOGICAL: No numbness or weakness  SKIN: No itching, burning, rashes, or lesions   PSYCH: Feels depressed, no HI/SI.

## 2018-05-20 NOTE — H&P ADULT - NSHPLABSRESULTS_GEN_ALL_CORE
(05-20 @ 17:27)                      14.9  5.6 )-----------( 275                 42.8    Neutrophils = 3.7 (66.9%)  Lymphocytes = 1.2 (21.3%)  Eosinophils = 0.0 (0.8%)  Basophils = 0.0 (0.3%)  Monocytes = 0.6 (10.8%)  Bands = --%    05-20    142  |  98  |  8   ----------------------------<  104<H>  3.9   |  23  |  0.55    Ca    9.2      20 May 2018 17:27  Phos  3.0     05-20  Mg     1.9     05-20    TPro  8.6<H>  /  Alb  5.4<H>  /  TBili  0.3  /  DBili  x   /  AST  77<H>  /  ALT  47<H>  /  AlkPhos  84  05-20    ( 20 May 2018 17:27 )   PT: 12.0 sec;   INR: 1.10 ratio;    PTT:29.6 sec  Tox:   (05-20 @ 17:27)  Acetaminophen Level, Serum: <15 [10 - 30]  Barbiturates Measurement: --  Benzodiazepines: --  Ethanol, Whole Blood: --  Salicylate Level, Serum: <2.0 [15.0 - 30.0]    EKG: NSR  CXR: clear

## 2018-05-20 NOTE — H&P ADULT - ASSESSMENT
28yo F w/ hx of depression, anxiety, substance abuse(xanax, cocaine), alcohol abuse/withdraw (no ICU, seizure hx) presented with hematemesis, concerning for alcohol intoxication and upper GIB.

## 2018-05-20 NOTE — H&P ADULT - NSHPSOCIALHISTORY_GEN_ALL_CORE
Drinks 20 beers a day. Hx of cocaine, xanax abuse. Currently smoking 1 pack/day x 10 years. Currently use marijuana occasionally.

## 2018-05-21 LAB
ALBUMIN SERPL ELPH-MCNC: 4.1 G/DL — SIGNIFICANT CHANGE UP (ref 3.3–5)
ALP SERPL-CCNC: 71 U/L — SIGNIFICANT CHANGE UP (ref 40–120)
ALT FLD-CCNC: 37 U/L — SIGNIFICANT CHANGE UP (ref 10–45)
ANION GAP SERPL CALC-SCNC: 18 MMOL/L — HIGH (ref 5–17)
AST SERPL-CCNC: 60 U/L — HIGH (ref 10–40)
BASOPHILS # BLD AUTO: 0 K/UL — SIGNIFICANT CHANGE UP (ref 0–0.2)
BASOPHILS # BLD AUTO: 0 K/UL — SIGNIFICANT CHANGE UP (ref 0–0.2)
BASOPHILS NFR BLD AUTO: 0.5 % — SIGNIFICANT CHANGE UP (ref 0–2)
BASOPHILS NFR BLD AUTO: 0.9 % — SIGNIFICANT CHANGE UP (ref 0–2)
BILIRUB SERPL-MCNC: 0.9 MG/DL — SIGNIFICANT CHANGE UP (ref 0.2–1.2)
BUN SERPL-MCNC: 7 MG/DL — SIGNIFICANT CHANGE UP (ref 7–23)
CALCIUM SERPL-MCNC: 8.3 MG/DL — LOW (ref 8.4–10.5)
CHLORIDE SERPL-SCNC: 98 MMOL/L — SIGNIFICANT CHANGE UP (ref 96–108)
CO2 SERPL-SCNC: 22 MMOL/L — SIGNIFICANT CHANGE UP (ref 22–31)
CREAT SERPL-MCNC: 0.58 MG/DL — SIGNIFICANT CHANGE UP (ref 0.5–1.3)
EOSINOPHIL # BLD AUTO: 0 K/UL — SIGNIFICANT CHANGE UP (ref 0–0.5)
EOSINOPHIL # BLD AUTO: 0.1 K/UL — SIGNIFICANT CHANGE UP (ref 0–0.5)
EOSINOPHIL NFR BLD AUTO: 0.5 % — SIGNIFICANT CHANGE UP (ref 0–6)
EOSINOPHIL NFR BLD AUTO: 1 % — SIGNIFICANT CHANGE UP (ref 0–6)
GLUCOSE SERPL-MCNC: 65 MG/DL — LOW (ref 70–99)
HCT VFR BLD CALC: 36.3 % — SIGNIFICANT CHANGE UP (ref 34.5–45)
HCT VFR BLD CALC: 37.1 % — SIGNIFICANT CHANGE UP (ref 34.5–45)
HCT VFR BLD CALC: 37.4 % — SIGNIFICANT CHANGE UP (ref 34.5–45)
HGB BLD-MCNC: 12.4 G/DL — SIGNIFICANT CHANGE UP (ref 11.5–15.5)
HGB BLD-MCNC: 12.4 G/DL — SIGNIFICANT CHANGE UP (ref 11.5–15.5)
HGB BLD-MCNC: 13.1 G/DL — SIGNIFICANT CHANGE UP (ref 11.5–15.5)
LYMPHOCYTES # BLD AUTO: 1 K/UL — SIGNIFICANT CHANGE UP (ref 1–3.3)
LYMPHOCYTES # BLD AUTO: 1.6 K/UL — SIGNIFICANT CHANGE UP (ref 1–3.3)
LYMPHOCYTES # BLD AUTO: 19.6 % — SIGNIFICANT CHANGE UP (ref 13–44)
LYMPHOCYTES # BLD AUTO: 28.2 % — SIGNIFICANT CHANGE UP (ref 13–44)
MAGNESIUM SERPL-MCNC: 1.6 MG/DL — SIGNIFICANT CHANGE UP (ref 1.6–2.6)
MCHC RBC-ENTMCNC: 32.3 PG — SIGNIFICANT CHANGE UP (ref 27–34)
MCHC RBC-ENTMCNC: 33.1 GM/DL — SIGNIFICANT CHANGE UP (ref 32–36)
MCHC RBC-ENTMCNC: 33.1 PG — SIGNIFICANT CHANGE UP (ref 27–34)
MCHC RBC-ENTMCNC: 34 GM/DL — SIGNIFICANT CHANGE UP (ref 32–36)
MCHC RBC-ENTMCNC: 34.6 PG — HIGH (ref 27–34)
MCHC RBC-ENTMCNC: 35.3 GM/DL — SIGNIFICANT CHANGE UP (ref 32–36)
MCV RBC AUTO: 97.2 FL — SIGNIFICANT CHANGE UP (ref 80–100)
MCV RBC AUTO: 97.7 FL — SIGNIFICANT CHANGE UP (ref 80–100)
MCV RBC AUTO: 97.9 FL — SIGNIFICANT CHANGE UP (ref 80–100)
MONOCYTES # BLD AUTO: 0.4 K/UL — SIGNIFICANT CHANGE UP (ref 0–0.9)
MONOCYTES # BLD AUTO: 0.4 K/UL — SIGNIFICANT CHANGE UP (ref 0–0.9)
MONOCYTES NFR BLD AUTO: 7.4 % — SIGNIFICANT CHANGE UP (ref 2–14)
MONOCYTES NFR BLD AUTO: 7.9 % — SIGNIFICANT CHANGE UP (ref 2–14)
NEUTROPHILS # BLD AUTO: 3.6 K/UL — SIGNIFICANT CHANGE UP (ref 1.8–7.4)
NEUTROPHILS # BLD AUTO: 3.8 K/UL — SIGNIFICANT CHANGE UP (ref 1.8–7.4)
NEUTROPHILS NFR BLD AUTO: 62.9 % — SIGNIFICANT CHANGE UP (ref 43–77)
NEUTROPHILS NFR BLD AUTO: 71.1 % — SIGNIFICANT CHANGE UP (ref 43–77)
PHOSPHATE SERPL-MCNC: 2.7 MG/DL — SIGNIFICANT CHANGE UP (ref 2.5–4.5)
PLATELET # BLD AUTO: 188 K/UL — SIGNIFICANT CHANGE UP (ref 150–400)
PLATELET # BLD AUTO: 211 K/UL — SIGNIFICANT CHANGE UP (ref 150–400)
PLATELET # BLD AUTO: 224 K/UL — SIGNIFICANT CHANGE UP (ref 150–400)
POTASSIUM SERPL-MCNC: 3.4 MMOL/L — LOW (ref 3.5–5.3)
POTASSIUM SERPL-SCNC: 3.4 MMOL/L — LOW (ref 3.5–5.3)
PROT SERPL-MCNC: 6.6 G/DL — SIGNIFICANT CHANGE UP (ref 6–8.3)
RBC # BLD: 3.73 M/UL — LOW (ref 3.8–5.2)
RBC # BLD: 3.78 M/UL — LOW (ref 3.8–5.2)
RBC # BLD: 3.83 M/UL — SIGNIFICANT CHANGE UP (ref 3.8–5.2)
RBC # FLD: 11.2 % — SIGNIFICANT CHANGE UP (ref 10.3–14.5)
RBC # FLD: 11.4 % — SIGNIFICANT CHANGE UP (ref 10.3–14.5)
RBC # FLD: 11.5 % — SIGNIFICANT CHANGE UP (ref 10.3–14.5)
SODIUM SERPL-SCNC: 138 MMOL/L — SIGNIFICANT CHANGE UP (ref 135–145)
WBC # BLD: 5.3 K/UL — SIGNIFICANT CHANGE UP (ref 3.8–10.5)
WBC # BLD: 5.6 K/UL — SIGNIFICANT CHANGE UP (ref 3.8–10.5)
WBC # BLD: 5.7 K/UL — SIGNIFICANT CHANGE UP (ref 3.8–10.5)
WBC # FLD AUTO: 5.3 K/UL — SIGNIFICANT CHANGE UP (ref 3.8–10.5)
WBC # FLD AUTO: 5.6 K/UL — SIGNIFICANT CHANGE UP (ref 3.8–10.5)
WBC # FLD AUTO: 5.7 K/UL — SIGNIFICANT CHANGE UP (ref 3.8–10.5)

## 2018-05-21 PROCEDURE — 99233 SBSQ HOSP IP/OBS HIGH 50: CPT | Mod: GC

## 2018-05-21 PROCEDURE — 99222 1ST HOSP IP/OBS MODERATE 55: CPT

## 2018-05-21 RX ORDER — BENZOCAINE AND MENTHOL 5; 1 G/100ML; G/100ML
1 LIQUID ORAL
Qty: 0 | Refills: 0 | Status: DISCONTINUED | OUTPATIENT
Start: 2018-05-21 | End: 2018-05-24

## 2018-05-21 RX ORDER — POTASSIUM CHLORIDE 20 MEQ
40 PACKET (EA) ORAL ONCE
Qty: 0 | Refills: 0 | Status: COMPLETED | OUTPATIENT
Start: 2018-05-21 | End: 2018-05-21

## 2018-05-21 RX ADMIN — Medication 50 MILLIGRAM(S): at 20:09

## 2018-05-21 RX ADMIN — Medication 4 MILLIGRAM(S): at 15:50

## 2018-05-21 RX ADMIN — Medication 1 PATCH: at 17:15

## 2018-05-21 RX ADMIN — Medication 2 MILLIGRAM(S): at 06:38

## 2018-05-21 RX ADMIN — Medication 2 MILLIGRAM(S): at 08:27

## 2018-05-21 RX ADMIN — Medication 4 MILLIGRAM(S): at 17:27

## 2018-05-21 RX ADMIN — Medication 1 TABLET(S): at 12:05

## 2018-05-21 RX ADMIN — Medication 105 MILLIGRAM(S): at 12:43

## 2018-05-21 RX ADMIN — PANTOPRAZOLE SODIUM 40 MILLIGRAM(S): 20 TABLET, DELAYED RELEASE ORAL at 06:38

## 2018-05-21 RX ADMIN — Medication 50 MILLIGRAM(S): at 22:08

## 2018-05-21 RX ADMIN — ONDANSETRON 4 MILLIGRAM(S): 8 TABLET, FILM COATED ORAL at 22:07

## 2018-05-21 RX ADMIN — Medication 2 MILLIGRAM(S): at 20:10

## 2018-05-21 RX ADMIN — Medication 40 MILLIEQUIVALENT(S): at 17:31

## 2018-05-21 RX ADMIN — ONDANSETRON 4 MILLIGRAM(S): 8 TABLET, FILM COATED ORAL at 08:26

## 2018-05-21 RX ADMIN — PANTOPRAZOLE SODIUM 40 MILLIGRAM(S): 20 TABLET, DELAYED RELEASE ORAL at 17:30

## 2018-05-21 RX ADMIN — Medication 50 MILLIGRAM(S): at 23:45

## 2018-05-21 RX ADMIN — Medication 1 MILLIGRAM(S): at 12:05

## 2018-05-21 RX ADMIN — Medication 2 MILLIGRAM(S): at 15:14

## 2018-05-21 RX ADMIN — Medication 2 MILLIGRAM(S): at 12:05

## 2018-05-21 NOTE — CONSULT NOTE ADULT - SUBJECTIVE AND OBJECTIVE BOX
Chief Complaint:  Patient is a 27y old  Female with hematemesis      HPI:  This is a 27 year old female with a history of ethanol abuse, anxiety who presents with 2 days of hematemesis of bright red blood. Happened 5 times on Sat and last time was Sunday morning. This was preceeded by numerous episodes of dry heaving. She also endorses some epigastric abdominal pain and intermittent dysphagia to solids and liquids (no ondynophagie) and unquantified unintentional weight loss. Her last stool this am had some black mixed with red blood. She denies NSAID or antiplatelet/blood thinner use, or PPI use. She drinks ~20 beers per day and wine.   The patient has required several pushes of ativan while hospitalized.   She has never had an EGD or GI bleed previously.   SHe has been tachycardic while inpatient.  Her Hgb was ~14 on admission which was then ~12 which has been stable on serial CBCs.    Allergies:  No Known Allergies      Home Medications:    Hospital Medications:  benzocaine 15 mG/menthol 3.6 mG Lozenge 1 Lozenge Oral four times a day PRN  folic acid 1 milliGRAM(s) Oral daily  LORazepam     Tablet 2 milliGRAM(s) Oral every 2 hours PRN  LORazepam   Injectable 2 milliGRAM(s) IV Push every 1 hour PRN  multivitamin 1 Tablet(s) Oral daily  nicotine - 21 mG/24Hr(s) Patch 1 patch Transdermal daily  ondansetron Injectable 4 milliGRAM(s) IV Push every 6 hours PRN  pantoprazole  Injectable 40 milliGRAM(s) IV Push two times a day  thiamine IVPB 500 milliGRAM(s) IV Intermittent daily  traZODone 50 milliGRAM(s) Oral at bedtime      PMHX/PSHX:  Alcohol abuse  Anxiety  Depression  No significant past surgical history      Family history:  Family history of MS (multiple sclerosis) (Sibling)      Social History:     ROS:       Eyes:  Good vision, no reported pain  ENT:  No sore throat, pain, runny nose, dysphagia  CV:  No pain, palpitations, hypo/hypertension  Resp:  No dyspnea, cough, tachypnea, wheezing  GI:  See HPI  :  No pain, bleeding, incontinence, nocturia  Muscle:  No pain, weakness  Neuro:  No weakness, tingling, memory problems  Psych:  No fatigue, insomnia, mood problems, depression  Endocrine:  No polyuria, polydipsia, cold/heat intolerance  Heme:  No petechiae, ecchymosis, easy bruisability  Skin:  No rash, edema      PHYSICAL EXAM:     GENERAL:  Appears stated age, well-groomed, well-nourished, no distress, well appearing  HEENT:  NC/AT,  conjunctivae clear and pink,  no JVD  CHEST:  Full & symmetric excursion, no increased effort, breath sounds clear  HEART:  Regular rhythm, S1, S2, no murmur/rub/S3/S4, no abdominal bruit, no edema  ABDOMEN:  Soft, mildly diffusely-tender, non-distended, normoactive bowel sounds,  no masses , no hepatosplenomegaly  EXTREMITIES:  no cyanosis,clubbing or edema  SKIN:  No rash/erythema/ecchymoses/petechiae/wounds/abscess/warm/dry, no telangectasia  NEURO:  Alert, oriented    Vital Signs:  Vital Signs Last 24 Hrs  T(C): 37.2 (21 May 2018 14:42), Max: 37.2 (21 May 2018 14:42)  T(F): 99 (21 May 2018 14:42), Max: 99 (21 May 2018 14:42)  HR: 65 (21 May 2018 11:45) (65 - 110)  BP: 148/100 (21 May 2018 11:45) (118/79 - 151/75)  BP(mean): --  RR: 18 (21 May 2018 14:42) (18 - 20)  SpO2: 100% (21 May 2018 11:45) (96% - 100%)  Daily Height in cm: 170.18 (20 May 2018 22:13)    Daily     LABS:                        12.4   5.7   )-----------( 211      ( 21 May 2018 07:17 )             37.4     05-21    138  |  98  |  7   ----------------------------<  65<L>  3.4<L>   |  22  |  0.58    Ca    8.3<L>      21 May 2018 07:17  Phos  2.7     05-21  Mg     1.6     05-21    TPro  6.6  /  Alb  4.1  /  TBili  0.9  /  DBili  x   /  AST  60<H>  /  ALT  37  /  AlkPhos  71  05-21    LIVER FUNCTIONS - ( 21 May 2018 07:17 )  Alb: 4.1 g/dL / Pro: 6.6 g/dL / ALK PHOS: 71 U/L / ALT: 37 U/L / AST: 60 U/L / GGT: x           PT/INR - ( 20 May 2018 17:27 )   PT: 12.0 sec;   INR: 1.10 ratio         PTT - ( 20 May 2018 17:27 )  PTT:29.6 sec          Lipase serum61

## 2018-05-21 NOTE — PROGRESS NOTE ADULT - PROBLEM SELECTOR PLAN 1
- Patient reported vomiting blood. Likely from upper GI source.   - Currently HD stable no acute distress. Will check CBC q12h.  - Make sure patient has two large bore IV.  - No hx of cirrhosis. Low likelihood variceal bleeding.   - Pantoprazole 40mg IVP BID ordered.  - Consult GI in the am. - Patient reported vomiting blood. Likely from upper GI source.   - Hemodynamically stable, hgb stable.   - Pantoprazole 40mg IVP BID ordered.  - GI consulted.

## 2018-05-21 NOTE — PROGRESS NOTE ADULT - PROBLEM SELECTOR PLAN 3
- Hx of depression, currently mood stable without SI/HI. Not on medication and not f/u with psychiatry yet after previous admission.  - Will restart trazodone as patient tolerated well with sx control.   - Monitor QTc in the am (now at 480 on admission).  - Will consult psychiatry in the am for future f/u plan. - Hx of depression, currently mood stable without SI/HI.   - Psych follow up as an outpatient.   - Holding Trazadone while on ciwa protocol. - Hx of depression, currently mood stable without SI/HI.   - Psych follow up as an outpatient.   -  on last ekg.   - c/w Trazadone

## 2018-05-21 NOTE — CONSULT NOTE ADULT - ASSESSMENT
Impression:    1. Upper GI bleeding: Ddx includes Kasey Diaz tear, Esophagitis, cannot exclude peptic ulcer disease, no clinical signs to suggest portal hypertension/varices.    2. Acute alcoholic hepatitis/ethanol abuse/withdrawal: DF<32    3. Dysphagia: ddx includes EoE, dysmotility ([place])    Recommendation:  -PPI Drip  -check another hgb this evening to ensure it is stable, if downtrending, would lean towards performing endoscopy tomorrow  -no steroids indicated given low DF

## 2018-05-21 NOTE — PROGRESS NOTE ADULT - SUBJECTIVE AND OBJECTIVE BOX
Sidney Garcia  PGY-1 Resident Physician   Pager [phone] or [phone] from 7am to 7pm, after 7pm    Patient is a 27y old  Female who presents with a chief complaint of alcohol withdrawal (20 May 2018 20:37)    SUBJECTIVE / OVERNIGHT EVENTS:  No acute overnight events. Patient not sleeping well due to anxiety. Patient denied nausea, vomiting, headaches, dizziness, lightheadedness chest pain, abdominal pain.     MEDICATIONS  (STANDING):  folic acid 1 milliGRAM(s) Oral daily  multivitamin 1 Tablet(s) Oral daily  nicotine - 21 mG/24Hr(s) Patch 1 patch Transdermal daily  pantoprazole  Injectable 40 milliGRAM(s) IV Push two times a day  thiamine IVPB 500 milliGRAM(s) IV Intermittent daily  traZODone 50 milliGRAM(s) Oral at bedtime    MEDICATIONS  (PRN):  LORazepam     Tablet 2 milliGRAM(s) Oral every 2 hours PRN Symptom-triggered 2 point increase in CIWA-Ar  LORazepam   Injectable 2 milliGRAM(s) IV Push every 1 hour PRN Symptom-triggered: each CIWA -Ar score 8 or GREATER  ondansetron Injectable 4 milliGRAM(s) IV Push every 6 hours PRN Nausea    Allergies    No Known Allergies    Intolerances        Vital Signs Last 24 Hrs  T(C): 37 (21 May 2018 08:17), Max: 37.1 (20 May 2018 19:40)  T(F): 98.6 (21 May 2018 08:17), Max: 98.8 (20 May 2018 19:40)  HR: 101 (21 May 2018 08:17) (94 - 110)  BP: 135/91 (21 May 2018 08:17) (118/79 - 151/75)  BP(mean): --  RR: 18 (21 May 2018 08:17) (18 - 20)  SpO2: 99% (21 May 2018 08:17) (96% - 99%)  Daily Height in cm: 170.18 (20 May 2018 22:13)    Daily   CAPILLARY BLOOD GLUCOSE        I&O's Summary    20 May 2018 07:01  -  21 May 2018 07:00  --------------------------------------------------------  IN: 1000 mL / OUT: 1 mL / NET: 999 mL        PHYSICAL EXAM:  GENERAL: NAD, well-developed  HEAD:  Atraumatic, Normocephalic  EYES: EOMI, PERRLA, conjunctiva and sclera clear  NECK: Supple, No JVD  CHEST/LUNG: Clear to auscultation bilaterally; No wheeze  HEART: Regular rate and rhythm; Normal S1 S2, No murmurs, rubs, or gallops  ABDOMEN: Soft, Nontender, Nondistended; Bowel sounds present  EXTREMITIES:  2+ Peripheral Pulses, No clubbing, cyanosis, or edema  PSYCH: AAOx3  NEUROLOGY: non-focal  SKIN: No rashes or lesions    LABS:                        12.4   5.7   )-----------( 211      ( 21 May 2018 07:17 )             37.4     Hgb Trend: 12.4<--, 12.4<--, 14.9<--  05-21    138  |  98  |  7   ----------------------------<  65<L>  3.4<L>   |  22  |  0.58    Ca    8.3<L>      21 May 2018 07:17  Phos  2.7     05-21  Mg     1.6     05-21    TPro  6.6  /  Alb  4.1  /  TBili  0.9  /  DBili  x   /  AST  60<H>  /  ALT  37  /  AlkPhos  71  05-21    Creatinine Trend: 0.58<--, 0.55<--  LIVER FUNCTIONS - ( 21 May 2018 07:17 )  Alb: 4.1 g/dL / Pro: 6.6 g/dL / ALK PHOS: 71 U/L / ALT: 37 U/L / AST: 60 U/L / GGT: x           PT/INR - ( 20 May 2018 17:27 )   PT: 12.0 sec;   INR: 1.10 ratio         PTT - ( 20 May 2018 17:27 )  PTT:29.6 sec          RADIOLOGY & ADDITIONAL TESTS:    Imaging Personally Reviewed:    Consultant(s) Notes Reviewed:      Care Discussed with Consultants/Other Providers: Sidney Garcia  PGY-1 Resident Physician   Pager [phone] or [phone] from 7am to 7pm, after 7pm    Patient is a 27y old  Female who presents with a chief complaint of alcohol withdrawal (20 May 2018 20:37)    SUBJECTIVE / OVERNIGHT EVENTS:  No acute overnight events. Patient not sleeping well due to anxiety. Patient denied nausea, vomiting, headaches, dizziness, lightheadedness chest pain, abdominal pain.     MEDICATIONS  (STANDING):  folic acid 1 milliGRAM(s) Oral daily  multivitamin 1 Tablet(s) Oral daily  nicotine - 21 mG/24Hr(s) Patch 1 patch Transdermal daily  pantoprazole  Injectable 40 milliGRAM(s) IV Push two times a day  thiamine IVPB 500 milliGRAM(s) IV Intermittent daily  traZODone 50 milliGRAM(s) Oral at bedtime    MEDICATIONS  (PRN):  LORazepam     Tablet 2 milliGRAM(s) Oral every 2 hours PRN Symptom-triggered 2 point increase in CIWA-Ar  LORazepam   Injectable 2 milliGRAM(s) IV Push every 1 hour PRN Symptom-triggered: each CIWA -Ar score 8 or GREATER  ondansetron Injectable 4 milliGRAM(s) IV Push every 6 hours PRN Nausea    Allergies  No Known Allergies  Intolerances    Vital Signs Last 24 Hrs  T(C): 37 (21 May 2018 08:17), Max: 37.1 (20 May 2018 19:40)  T(F): 98.6 (21 May 2018 08:17), Max: 98.8 (20 May 2018 19:40)  HR: 101 (21 May 2018 08:17) (94 - 110)  BP: 135/91 (21 May 2018 08:17) (118/79 - 151/75)  RR: 18 (21 May 2018 08:17) (18 - 20)  SpO2: 99% (21 May 2018 08:17) (96% - 99%)  Daily Height in cm: 170.18 (20 May 2018 22:13)      I&O's Summary    20 May 2018 07:01  -  21 May 2018 07:00  --------------------------------------------------------  IN: 1000 mL / OUT: 1 mL / NET: 999 mL    PHYSICAL EXAM:  GENERAL: NAD, well-developed  HEAD:  Atraumatic, Normocephalic  EYES: EOMI, PERRLA, conjunctiva and sclera clear  NECK: Supple, No JVD  CHEST/LUNG: Clear to auscultation bilaterally; No wheeze  HEART: Regular rate and rhythm; Normal S1 S2, No murmurs, rubs, or gallops  ABDOMEN: Soft, Nontender, Nondistended; Bowel sounds present  EXTREMITIES:  2+ Peripheral Pulses, No clubbing, cyanosis, or edema  PSYCH: AAOx3  NEUROLOGY: non-focal  SKIN: No rashes or lesions    LABS:                        12.4   5.7   )-----------( 211      ( 21 May 2018 07:17 )             37.4     Hgb Trend: 12.4<--, 12.4<--, 14.9<--  05-21    138  |  98  |  7   ----------------------------<  65<L>  3.4<L>   |  22  |  0.58    Ca    8.3<L>      21 May 2018 07:17  Phos  2.7     05-21  Mg     1.6     05-21    TPro  6.6  /  Alb  4.1  /  TBili  0.9  /  DBili  x   /  AST  60<H>  /  ALT  37  /  AlkPhos  71  05-21    Creatinine Trend: 0.58<--, 0.55<--  LIVER FUNCTIONS - ( 21 May 2018 07:17 )  Alb: 4.1 g/dL / Pro: 6.6 g/dL / ALK PHOS: 71 U/L / ALT: 37 U/L / AST: 60 U/L / GGT: x           PT/INR - ( 20 May 2018 17:27 )   PT: 12.0 sec;   INR: 1.10 ratio    PTT - ( 20 May 2018 17:27 )  PTT:29.6 sec    RADIOLOGY & ADDITIONAL TESTS:    Imaging Personally Reviewed:    Consultant(s) Notes Reviewed:      Care Discussed with Consultants/Other Providers:

## 2018-05-21 NOTE — PROGRESS NOTE ADULT - PROBLEM SELECTOR PLAN 4
- DVT ppx with SCDs only. No chemo AC as patient bleeding risk.  - NPO except ice and meds. - DVT ppx with SCDs only. No chemo AC as patient bleeding risk.  - DIET- Clear liquids

## 2018-05-21 NOTE — CONSULT NOTE ADULT - ATTENDING COMMENTS
28 yo F pmh etoh abuse presents with hematemesis in setting of vomiting and acute etoh hepatitis.  Last episode of hematemesis 36+ hours ago with last episode of vomiting 24+ hours ago.  She has required benzo during this stay for CIWA+ withdrawal.  Ideally will await until withdrawal has stabalized, however if recurrent bleeding occurs or H&H not stable, will need EGD tomm.    Plan:  - CBC q 12 --> Pending this trend, will decide on EGD for tomm  - clear liquid diet  - IV PPI  - Monitor for withdrawal  - MVI, Folate, Thiamine  - No role for steroids in patient with mild etoh hep  - Agree with referral to etoh cessation

## 2018-05-21 NOTE — PROGRESS NOTE ADULT - PROBLEM SELECTOR PLAN 2
- Chronic hx of alcohol abuse. Currently daily drinker. Alcohol on admission 259 likely remain intoxicated overnight.  - [facility] symptomatic protocol ordred to prevent withdraw sx.   - Tachycardia resolved after 1L bolus. C/w banana bag 1L at 100cc/h overnight.  - Replete thiamine, folate and MV.  - SW consulted for detox resources.

## 2018-05-22 LAB
ANION GAP SERPL CALC-SCNC: 19 MMOL/L — HIGH (ref 5–17)
BASOPHILS # BLD AUTO: 0 K/UL — SIGNIFICANT CHANGE UP (ref 0–0.2)
BASOPHILS NFR BLD AUTO: 0.6 % — SIGNIFICANT CHANGE UP (ref 0–2)
BUN SERPL-MCNC: 5 MG/DL — LOW (ref 7–23)
CALCIUM SERPL-MCNC: 9.2 MG/DL — SIGNIFICANT CHANGE UP (ref 8.4–10.5)
CHLORIDE SERPL-SCNC: 98 MMOL/L — SIGNIFICANT CHANGE UP (ref 96–108)
CO2 SERPL-SCNC: 21 MMOL/L — LOW (ref 22–31)
CREAT SERPL-MCNC: 0.56 MG/DL — SIGNIFICANT CHANGE UP (ref 0.5–1.3)
EOSINOPHIL # BLD AUTO: 0.3 K/UL — SIGNIFICANT CHANGE UP (ref 0–0.5)
EOSINOPHIL NFR BLD AUTO: 5 % — SIGNIFICANT CHANGE UP (ref 0–6)
GLUCOSE SERPL-MCNC: 80 MG/DL — SIGNIFICANT CHANGE UP (ref 70–99)
HCT VFR BLD CALC: 38.2 % — SIGNIFICANT CHANGE UP (ref 34.5–45)
HGB BLD-MCNC: 13.1 G/DL — SIGNIFICANT CHANGE UP (ref 11.5–15.5)
LYMPHOCYTES # BLD AUTO: 0.9 K/UL — LOW (ref 1–3.3)
LYMPHOCYTES # BLD AUTO: 16.7 % — SIGNIFICANT CHANGE UP (ref 13–44)
MAGNESIUM SERPL-MCNC: 1.7 MG/DL — SIGNIFICANT CHANGE UP (ref 1.6–2.6)
MCHC RBC-ENTMCNC: 33.8 PG — SIGNIFICANT CHANGE UP (ref 27–34)
MCHC RBC-ENTMCNC: 34.3 GM/DL — SIGNIFICANT CHANGE UP (ref 32–36)
MCV RBC AUTO: 98.4 FL — SIGNIFICANT CHANGE UP (ref 80–100)
MONOCYTES # BLD AUTO: 0.4 K/UL — SIGNIFICANT CHANGE UP (ref 0–0.9)
MONOCYTES NFR BLD AUTO: 6.6 % — SIGNIFICANT CHANGE UP (ref 2–14)
NEUTROPHILS # BLD AUTO: 3.8 K/UL — SIGNIFICANT CHANGE UP (ref 1.8–7.4)
NEUTROPHILS NFR BLD AUTO: 71.1 % — SIGNIFICANT CHANGE UP (ref 43–77)
PHOSPHATE SERPL-MCNC: 2.8 MG/DL — SIGNIFICANT CHANGE UP (ref 2.5–4.5)
PLATELET # BLD AUTO: 164 K/UL — SIGNIFICANT CHANGE UP (ref 150–400)
POTASSIUM SERPL-MCNC: 3.5 MMOL/L — SIGNIFICANT CHANGE UP (ref 3.5–5.3)
POTASSIUM SERPL-SCNC: 3.5 MMOL/L — SIGNIFICANT CHANGE UP (ref 3.5–5.3)
RBC # BLD: 3.88 M/UL — SIGNIFICANT CHANGE UP (ref 3.8–5.2)
RBC # FLD: 11.3 % — SIGNIFICANT CHANGE UP (ref 10.3–14.5)
SODIUM SERPL-SCNC: 138 MMOL/L — SIGNIFICANT CHANGE UP (ref 135–145)
WBC # BLD: 5.3 K/UL — SIGNIFICANT CHANGE UP (ref 3.8–10.5)
WBC # FLD AUTO: 5.3 K/UL — SIGNIFICANT CHANGE UP (ref 3.8–10.5)

## 2018-05-22 PROCEDURE — 99232 SBSQ HOSP IP/OBS MODERATE 35: CPT

## 2018-05-22 PROCEDURE — 99233 SBSQ HOSP IP/OBS HIGH 50: CPT | Mod: GC

## 2018-05-22 RX ADMIN — Medication 2 MILLIGRAM(S): at 15:16

## 2018-05-22 RX ADMIN — Medication 1 PATCH: at 11:53

## 2018-05-22 RX ADMIN — Medication 50 MILLIGRAM(S): at 18:16

## 2018-05-22 RX ADMIN — Medication 105 MILLIGRAM(S): at 11:57

## 2018-05-22 RX ADMIN — Medication 2 MILLIGRAM(S): at 11:54

## 2018-05-22 RX ADMIN — Medication 2 MILLIGRAM(S): at 22:28

## 2018-05-22 RX ADMIN — Medication 1 MILLIGRAM(S): at 21:07

## 2018-05-22 RX ADMIN — PANTOPRAZOLE SODIUM 40 MILLIGRAM(S): 20 TABLET, DELAYED RELEASE ORAL at 06:47

## 2018-05-22 RX ADMIN — Medication 50 MILLIGRAM(S): at 06:47

## 2018-05-22 RX ADMIN — Medication 50 MILLIGRAM(S): at 22:28

## 2018-05-22 RX ADMIN — PANTOPRAZOLE SODIUM 40 MILLIGRAM(S): 20 TABLET, DELAYED RELEASE ORAL at 16:46

## 2018-05-22 NOTE — PROGRESS NOTE ADULT - PROBLEM SELECTOR PLAN 1
- Patient reported vomiting blood. Likely from upper GI source.   - Hemodynamically stable, hgb stable.   - Pantoprazole 40mg IVP BID ordered.  - GI recs appreciated. Trend CBC for possible scope.

## 2018-05-22 NOTE — PROGRESS NOTE ADULT - ASSESSMENT
26yo F w/ hx of depression, anxiety, substance abuse(xanax, cocaine), alcohol abuse/withdraw (no ICU, seizure hx) presented with hematemesis, concerning for alcohol intoxication and upper GIB.

## 2018-05-22 NOTE — PROGRESS NOTE ADULT - PROBLEM SELECTOR PLAN 2
- Chronic hx of alcohol abuse. Currently daily drinker. Alcohol on admission 259 likely remain intoxicated overnight.  - [facility] symptomatic protocol ordred to prevent withdraw sx.   - Tachycardia resolved after 1L bolus. C/w banana bag 1L at 100cc/h overnight.  - Replete thiamine, folate and MV.  - SW consulted for detox resources.  - Now on a librium taper, tolerating well.

## 2018-05-22 NOTE — PROGRESS NOTE ADULT - SUBJECTIVE AND OBJECTIVE BOX
Patient is a 27y old  Female who presents with a chief complaint of alcohol withdrawal (20 May 2018 20:37)      SUBJECTIVE / OVERNIGHT EVENTS:  Last BM yesterday. No hematemesis.    MEDICATIONS  (STANDING):  chlordiazePOXIDE 50 milliGRAM(s) Oral every 12 hours  chlordiazePOXIDE   Oral   folic acid 1 milliGRAM(s) Oral daily  multivitamin 1 Tablet(s) Oral daily  nicotine - 21 mG/24Hr(s) Patch 1 patch Transdermal daily  pantoprazole  Injectable 40 milliGRAM(s) IV Push two times a day  thiamine IVPB 500 milliGRAM(s) IV Intermittent daily  traZODone 50 milliGRAM(s) Oral at bedtime    MEDICATIONS  (PRN):  benzocaine 15 mG/menthol 3.6 mG Lozenge 1 Lozenge Oral four times a day PRN Sore Throat  LORazepam     Tablet 2 milliGRAM(s) Oral every 2 hours PRN Symptom-triggered 2 point increase in CIWA-Ar for CIWA < 7  LORazepam     Tablet 2 milliGRAM(s) Oral every 2 hours PRN Symptom-triggered 2 point increase in CIWA-Ar  LORazepam   Injectable 2 milliGRAM(s) IV Push every 1 hour PRN Symptom-triggered: each CIWA -Ar score 8 or GREATER  ondansetron Injectable 4 milliGRAM(s) IV Push every 6 hours PRN Nausea              PHYSICAL EXAM:  GENERAL: NAD, well-developed  HEAD:  Atraumatic, Normocephalic  EYES: EOMI, PERRLA, conjunctiva and sclera anicteric  NECK: Supple, No JVD  CHEST/LUNG: Clear to auscultation bilaterally; No wheeze  HEART: Regular rate and rhythm; No murmurs, rubs, or gallops  ABDOMEN: Soft, Nontender, Nondistended; Bowel sounds present, no hepatosplenomegaly, no rebound or guarding  EXTREMITIES:  2+ Peripheral Pulses, No clubbing, cyanosis, or edema  PSYCH: AAOx3  NEUROLOGY: non-focal, no asterixis, sleepy  SKIN: No rashes or lesion    LABS:                        13.1   5.3   )-----------( 188      ( 21 May 2018 16:45 )             37.1     05-21    138  |  98  |  7   ----------------------------<  65<L>  3.4<L>   |  22  |  0.58    Ca    8.3<L>      21 May 2018 07:17  Phos  2.7     05-21  Mg     1.6     05-21    TPro  6.6  /  Alb  4.1  /  TBili  0.9  /  DBili  x   /  AST  60<H>  /  ALT  37  /  AlkPhos  71  05-21    LIVER FUNCTIONS - ( 21 May 2018 07:17 )  Alb: 4.1 g/dL / Pro: 6.6 g/dL / ALK PHOS: 71 U/L / ALT: 37 U/L / AST: 60 U/L / GGT: x           PT/INR - ( 20 May 2018 17:27 )   PT: 12.0 sec;   INR: 1.10 ratio         PTT - ( 20 May 2018 17:27 )  PTT:29.6 sec          RADIOLOGY & ADDITIONAL TESTS: Patient is a 27y old  Female who presents with a chief complaint of alcohol withdrawal (20 May 2018 20:37)      SUBJECTIVE / OVERNIGHT EVENTS:  Last BM yesterday.   No hematemesis.    MEDICATIONS  (STANDING):  chlordiazePOXIDE 50 milliGRAM(s) Oral every 12 hours  chlordiazePOXIDE   Oral   folic acid 1 milliGRAM(s) Oral daily  multivitamin 1 Tablet(s) Oral daily  nicotine - 21 mG/24Hr(s) Patch 1 patch Transdermal daily  pantoprazole  Injectable 40 milliGRAM(s) IV Push two times a day  thiamine IVPB 500 milliGRAM(s) IV Intermittent daily  traZODone 50 milliGRAM(s) Oral at bedtime    MEDICATIONS  (PRN):  benzocaine 15 mG/menthol 3.6 mG Lozenge 1 Lozenge Oral four times a day PRN Sore Throat  LORazepam     Tablet 2 milliGRAM(s) Oral every 2 hours PRN Symptom-triggered 2 point increase in CIWA-Ar for CIWA < 7  LORazepam     Tablet 2 milliGRAM(s) Oral every 2 hours PRN Symptom-triggered 2 point increase in CIWA-Ar  LORazepam   Injectable 2 milliGRAM(s) IV Push every 1 hour PRN Symptom-triggered: each CIWA -Ar score 8 or GREATER  ondansetron Injectable 4 milliGRAM(s) IV Push every 6 hours PRN Nausea              PHYSICAL EXAM:  GENERAL: NAD, well-developed  HEAD:  Atraumatic, Normocephalic  EYES: EOMI, PERRLA, conjunctiva and sclera anicteric  NECK: Supple, No JVD  CHEST/LUNG: Clear to auscultation bilaterally; No wheeze  HEART: Regular rate and rhythm; No murmurs, rubs, or gallops  ABDOMEN: Soft, Nontender, Nondistended; Bowel sounds present, no hepatosplenomegaly, no rebound or guarding  EXTREMITIES:  2+ Peripheral Pulses, No clubbing, cyanosis, or edema  PSYCH: AAOx3  NEUROLOGY: non-focal, no asterixis, sleepy  SKIN: No rashes or lesion    LABS:                        13.1   5.3   )-----------( 188      ( 21 May 2018 16:45 )             37.1     05-21    138  |  98  |  7   ----------------------------<  65<L>  3.4<L>   |  22  |  0.58    Ca    8.3<L>      21 May 2018 07:17  Phos  2.7     05-21  Mg     1.6     05-21    TPro  6.6  /  Alb  4.1  /  TBili  0.9  /  DBili  x   /  AST  60<H>  /  ALT  37  /  AlkPhos  71  05-21    LIVER FUNCTIONS - ( 21 May 2018 07:17 )  Alb: 4.1 g/dL / Pro: 6.6 g/dL / ALK PHOS: 71 U/L / ALT: 37 U/L / AST: 60 U/L / GGT: x           PT/INR - ( 20 May 2018 17:27 )   PT: 12.0 sec;   INR: 1.10 ratio         PTT - ( 20 May 2018 17:27 )  PTT:29.6 sec          RADIOLOGY & ADDITIONAL TESTS:

## 2018-05-22 NOTE — PROGRESS NOTE ADULT - PROBLEM SELECTOR PLAN 3
- Hx of depression, currently mood stable without SI/HI.   - Psych follow up as an outpatient.   -  on last ekg.   - c/w Trazadone

## 2018-05-22 NOTE — PROGRESS NOTE ADULT - SUBJECTIVE AND OBJECTIVE BOX
Sidney Garcia  PGY-1 Resident Physician   Pager [phone] or [phone] from 7am to 7pm, after 7pm    Patient is a 27y old  Female who presents with a chief complaint of alcohol withdrawal (20 May 2018 20:37)      SUBJECTIVE / OVERNIGHT EVENTS:    MEDICATIONS  (STANDING):  chlordiazePOXIDE 50 milliGRAM(s) Oral every 12 hours  chlordiazePOXIDE   Oral   folic acid 1 milliGRAM(s) Oral daily  multivitamin 1 Tablet(s) Oral daily  nicotine - 21 mG/24Hr(s) Patch 1 patch Transdermal daily  pantoprazole  Injectable 40 milliGRAM(s) IV Push two times a day  thiamine IVPB 500 milliGRAM(s) IV Intermittent daily  traZODone 50 milliGRAM(s) Oral at bedtime    MEDICATIONS  (PRN):  benzocaine 15 mG/menthol 3.6 mG Lozenge 1 Lozenge Oral four times a day PRN Sore Throat  LORazepam     Tablet 2 milliGRAM(s) Oral every 2 hours PRN Symptom-triggered 2 point increase in CIWA-Ar for CIWA < 7  LORazepam     Tablet 2 milliGRAM(s) Oral every 2 hours PRN Symptom-triggered 2 point increase in CIWA-Ar  LORazepam   Injectable 2 milliGRAM(s) IV Push every 1 hour PRN Symptom-triggered: each CIWA -Ar score 8 or GREATER  ondansetron Injectable 4 milliGRAM(s) IV Push every 6 hours PRN Nausea    Allergies    No Known Allergies    Intolerances        Vital Signs Last 24 Hrs  T(C): 36.6 (22 May 2018 07:00), Max: 37.7 (21 May 2018 16:25)  T(F): 97.8 (22 May 2018 07:00), Max: 99.9 (21 May 2018 16:25)  HR: 92 (22 May 2018 07:00) (65 - 128)  BP: 118/87 (22 May 2018 07:00) (115/83 - 152/104)  BP(mean): --  RR: 18 (22 May 2018 07:00) (18 - 20)  SpO2: 97% (22 May 2018 07:00) (96% - 100%)  Daily     Daily   CAPILLARY BLOOD GLUCOSE        I&O's Summary    21 May 2018 07:01  -  22 May 2018 07:00  --------------------------------------------------------  IN: 480 mL / OUT: 0 mL / NET: 480 mL        PHYSICAL EXAM:  GENERAL: NAD, well-developed  HEAD:  Atraumatic, Normocephalic  EYES: EOMI, PERRLA, conjunctiva and sclera clear  NECK: Supple, No JVD  CHEST/LUNG: Clear to auscultation bilaterally; No wheeze  HEART: Regular rate and rhythm; Normal S1 S2, No murmurs, rubs, or gallops  ABDOMEN: Soft, Nontender, Nondistended; Bowel sounds present  EXTREMITIES:  2+ Peripheral Pulses, No clubbing, cyanosis, or edema  PSYCH: AAOx3  NEUROLOGY: non-focal  SKIN: No rashes or lesions    LABS:                        13.1   5.3   )-----------( 188      ( 21 May 2018 16:45 )             37.1     Hgb Trend: 13.1<--, 12.4<--, 12.4<--, 14.9<--  05-21    138  |  98  |  7   ----------------------------<  65<L>  3.4<L>   |  22  |  0.58    Ca    8.3<L>      21 May 2018 07:17  Phos  2.7     05-21  Mg     1.6     05-21    TPro  6.6  /  Alb  4.1  /  TBili  0.9  /  DBili  x   /  AST  60<H>  /  ALT  37  /  AlkPhos  71  05-21    Creatinine Trend: 0.58<--, 0.55<--  LIVER FUNCTIONS - ( 21 May 2018 07:17 )  Alb: 4.1 g/dL / Pro: 6.6 g/dL / ALK PHOS: 71 U/L / ALT: 37 U/L / AST: 60 U/L / GGT: x           PT/INR - ( 20 May 2018 17:27 )   PT: 12.0 sec;   INR: 1.10 ratio         PTT - ( 20 May 2018 17:27 )  PTT:29.6 sec      RADIOLOGY & ADDITIONAL TESTS:    Imaging Personally Reviewed:    Consultant(s) Notes Reviewed:      Care Discussed with Consultants/Other Providers: Sidney Garcia  PGY-1 Resident Physician   Pager [phone] or [phone] from 7am to 7pm, after 7pm    Patient is a 27y old  Female who presents with a chief complaint of alcohol withdrawal (20 May 2018 20:37)    SUBJECTIVE / OVERNIGHT EVENTS:  No acute overnight events. Patient with low CIWA score, on librium taper. No bowel movements overnight. Denied any chest pain, abdominal pain, nausea, vomiting, diarrhea, constipation, headaches, dizziness.     MEDICATIONS  (STANDING):  chlordiazePOXIDE 50 milliGRAM(s) Oral every 12 hours  chlordiazePOXIDE   Oral   folic acid 1 milliGRAM(s) Oral daily  multivitamin 1 Tablet(s) Oral daily  nicotine - 21 mG/24Hr(s) Patch 1 patch Transdermal daily  pantoprazole  Injectable 40 milliGRAM(s) IV Push two times a day  thiamine IVPB 500 milliGRAM(s) IV Intermittent daily  traZODone 50 milliGRAM(s) Oral at bedtime    MEDICATIONS  (PRN):  benzocaine 15 mG/menthol 3.6 mG Lozenge 1 Lozenge Oral four times a day PRN Sore Throat  LORazepam     Tablet 2 milliGRAM(s) Oral every 2 hours PRN Symptom-triggered 2 point increase in CIWA-Ar for CIWA < 7  LORazepam     Tablet 2 milliGRAM(s) Oral every 2 hours PRN Symptom-triggered 2 point increase in CIWA-Ar  LORazepam   Injectable 2 milliGRAM(s) IV Push every 1 hour PRN Symptom-triggered: each CIWA -Ar score 8 or GREATER  ondansetron Injectable 4 milliGRAM(s) IV Push every 6 hours PRN Nausea    Allergies    No Known Allergies    Intolerances        Vital Signs Last 24 Hrs  T(C): 36.6 (22 May 2018 07:00), Max: 37.7 (21 May 2018 16:25)  T(F): 97.8 (22 May 2018 07:00), Max: 99.9 (21 May 2018 16:25)  HR: 92 (22 May 2018 07:00) (65 - 128)  BP: 118/87 (22 May 2018 07:00) (115/83 - 152/104)  BP(mean): --  RR: 18 (22 May 2018 07:00) (18 - 20)  SpO2: 97% (22 May 2018 07:00) (96% - 100%)  Daily     Daily   CAPILLARY BLOOD GLUCOSE        I&O's Summary    21 May 2018 07:01  -  22 May 2018 07:00  --------------------------------------------------------  IN: 480 mL / OUT: 0 mL / NET: 480 mL        PHYSICAL EXAM:  GENERAL: NAD, well-developed  HEAD:  Atraumatic, Normocephalic  EYES: EOMI, PERRLA, conjunctiva and sclera clear  NECK: Supple, No JVD  CHEST/LUNG: Clear to auscultation bilaterally; No wheeze  HEART: Regular rate and rhythm; Normal S1 S2, No murmurs, rubs, or gallops  ABDOMEN: Soft, Nontender, Nondistended; Bowel sounds present  EXTREMITIES:  2+ Peripheral Pulses, No clubbing, cyanosis, or edema  PSYCH: AAOx3  NEUROLOGY: non-focal  SKIN: No rashes or lesions    LABS:                        13.1   5.3   )-----------( 188      ( 21 May 2018 16:45 )             37.1     Hgb Trend: 13.1<--, 12.4<--, 12.4<--, 14.9<--  05-21    138  |  98  |  7   ----------------------------<  65<L>  3.4<L>   |  22  |  0.58    Ca    8.3<L>      21 May 2018 07:17  Phos  2.7     05-21  Mg     1.6     05-21    TPro  6.6  /  Alb  4.1  /  TBili  0.9  /  DBili  x   /  AST  60<H>  /  ALT  37  /  AlkPhos  71  05-21    Creatinine Trend: 0.58<--, 0.55<--  LIVER FUNCTIONS - ( 21 May 2018 07:17 )  Alb: 4.1 g/dL / Pro: 6.6 g/dL / ALK PHOS: 71 U/L / ALT: 37 U/L / AST: 60 U/L / GGT: x           PT/INR - ( 20 May 2018 17:27 )   PT: 12.0 sec;   INR: 1.10 ratio         PTT - ( 20 May 2018 17:27 )  PTT:29.6 sec      RADIOLOGY & ADDITIONAL TESTS:    Imaging Personally Reviewed:    Consultant(s) Notes Reviewed:      Care Discussed with Consultants/Other Providers:

## 2018-05-22 NOTE — PROGRESS NOTE ADULT - ASSESSMENT
Impression:    1. Hematemesis: no clinical evidence of further bleeding. DDX Kasey Diaz tear, Esophagitis, peptic ulcer disease.    2. Alcohol withdrawal/alcoholic hepatitis (DF<32)    Recommendation:  -would prefer PPI infusion pending endoscopic evaluation  -given patient clinically stable, would defer endoscopic eval pending resolution of ethanol w/d  -ethanol cessation  -no indication for steroids for alc hep

## 2018-05-23 LAB
AMPHET UR-MCNC: NEGATIVE — SIGNIFICANT CHANGE UP
BARBITURATES, URINE.: NEGATIVE — SIGNIFICANT CHANGE UP
BASOPHILS # BLD AUTO: 0.1 K/UL — SIGNIFICANT CHANGE UP (ref 0–0.2)
BASOPHILS NFR BLD AUTO: 0.7 % — SIGNIFICANT CHANGE UP (ref 0–2)
BENZODIAZ UR-MCNC: NEGATIVE — SIGNIFICANT CHANGE UP
COCAINE METAB.OTHER UR-MCNC: NEGATIVE — SIGNIFICANT CHANGE UP
CREATININE, URINE THERAPEUTIC: >200 MG/DL — SIGNIFICANT CHANGE UP
EOSINOPHIL # BLD AUTO: 0.3 K/UL — SIGNIFICANT CHANGE UP (ref 0–0.5)
EOSINOPHIL NFR BLD AUTO: 4.8 % — SIGNIFICANT CHANGE UP (ref 0–6)
HCT VFR BLD CALC: 43.6 % — SIGNIFICANT CHANGE UP (ref 34.5–45)
HGB BLD-MCNC: 13.7 G/DL — SIGNIFICANT CHANGE UP (ref 11.5–15.5)
LYMPHOCYTES # BLD AUTO: 1.9 K/UL — SIGNIFICANT CHANGE UP (ref 1–3.3)
LYMPHOCYTES # BLD AUTO: 26.4 % — SIGNIFICANT CHANGE UP (ref 13–44)
MCHC RBC-ENTMCNC: 30.7 PG — SIGNIFICANT CHANGE UP (ref 27–34)
MCHC RBC-ENTMCNC: 31.4 GM/DL — LOW (ref 32–36)
MCV RBC AUTO: 97.9 FL — SIGNIFICANT CHANGE UP (ref 80–100)
METHADONE UR-MCNC: NEGATIVE — SIGNIFICANT CHANGE UP
METHAQUALONE UR QL: NEGATIVE — SIGNIFICANT CHANGE UP
METHAQUALONE UR-MCNC: NEGATIVE — SIGNIFICANT CHANGE UP
MONOCYTES # BLD AUTO: 0.6 K/UL — SIGNIFICANT CHANGE UP (ref 0–0.9)
MONOCYTES NFR BLD AUTO: 7.6 % — SIGNIFICANT CHANGE UP (ref 2–14)
NEUTROPHILS # BLD AUTO: 4.4 K/UL — SIGNIFICANT CHANGE UP (ref 1.8–7.4)
NEUTROPHILS NFR BLD AUTO: 60.5 % — SIGNIFICANT CHANGE UP (ref 43–77)
OPIATES UR-MCNC: NEGATIVE — SIGNIFICANT CHANGE UP
PCP UR-MCNC: NEGATIVE — SIGNIFICANT CHANGE UP
PLATELET # BLD AUTO: 153 K/UL — SIGNIFICANT CHANGE UP (ref 150–400)
PROPOXYPH UR QL: NEGATIVE — SIGNIFICANT CHANGE UP
RBC # BLD: 4.46 M/UL — SIGNIFICANT CHANGE UP (ref 3.8–5.2)
RBC # FLD: 11.4 % — SIGNIFICANT CHANGE UP (ref 10.3–14.5)
THC UR QL: NEGATIVE — SIGNIFICANT CHANGE UP
WBC # BLD: 7.2 K/UL — SIGNIFICANT CHANGE UP (ref 3.8–10.5)
WBC # FLD AUTO: 7.2 K/UL — SIGNIFICANT CHANGE UP (ref 3.8–10.5)

## 2018-05-23 PROCEDURE — 99232 SBSQ HOSP IP/OBS MODERATE 35: CPT

## 2018-05-23 PROCEDURE — 99233 SBSQ HOSP IP/OBS HIGH 50: CPT | Mod: GC

## 2018-05-23 RX ADMIN — Medication 1 MILLIGRAM(S): at 12:16

## 2018-05-23 RX ADMIN — PANTOPRAZOLE SODIUM 40 MILLIGRAM(S): 20 TABLET, DELAYED RELEASE ORAL at 05:36

## 2018-05-23 RX ADMIN — Medication 2 MILLIGRAM(S): at 21:07

## 2018-05-23 RX ADMIN — Medication 25 MILLIGRAM(S): at 17:22

## 2018-05-23 RX ADMIN — PANTOPRAZOLE SODIUM 40 MILLIGRAM(S): 20 TABLET, DELAYED RELEASE ORAL at 17:03

## 2018-05-23 RX ADMIN — Medication 2 MILLIGRAM(S): at 12:38

## 2018-05-23 RX ADMIN — Medication 50 MILLIGRAM(S): at 05:40

## 2018-05-23 RX ADMIN — Medication 2 MILLIGRAM(S): at 08:24

## 2018-05-23 RX ADMIN — Medication 1 TABLET(S): at 12:16

## 2018-05-23 RX ADMIN — Medication 50 MILLIGRAM(S): at 21:06

## 2018-05-23 RX ADMIN — Medication 105 MILLIGRAM(S): at 12:15

## 2018-05-23 RX ADMIN — Medication 2 MILLIGRAM(S): at 17:03

## 2018-05-23 RX ADMIN — Medication 1 PATCH: at 12:16

## 2018-05-23 NOTE — PROGRESS NOTE ADULT - PROBLEM SELECTOR PLAN 2
- Chronic hx of alcohol abuse. Currently daily drinker. Alcohol on admission 259 likely remain intoxicated overnight.  - [facility] symptomatic protocol ordred to prevent withdraw sx.   - Tachycardia resolved after 1L bolus. C/w banana bag 1L at 100cc/h overnight.  - Replete thiamine, folate and MV.  - SW consulted for detox resources.  - Now on a librium taper, tolerating well.

## 2018-05-23 NOTE — PROGRESS NOTE ADULT - ASSESSMENT
Impression:    1. Hematemesis: hgb now stable. DDX Kasey Diaz tear, Esophagitis, peptic ulcer disease.    2. Alcohol withdrawal/alcoholic hepatitis (DF<32)    Recommendation:  -would prefer PPI infusion pending endoscopic evaluation  -given patient clinically stable, would defer endoscopic eval pending resolution of ethanol w/d  -ethanol cessation  -no indication for steroids for alc hep Impression:    1. Hematemesis: hgb now stable. DDX Kasey Diaz tear, Esophagitis, peptic ulcer disease.    2. Alcohol withdrawal/alcoholic hepatitis (DF<32)    Recommendation:  -high dose PPI  -given patient clinically stable, would defer endoscopic eval pending resolution of ethanol w/d  -ethanol cessation  -no indication for steroids for alc hep Impression:    1. Hematemesis: hgb now stable. DDX Kasey Diaz tear, Esophagitis, peptic ulcer disease.    2. Alcohol withdrawal/alcoholic hepatitis (DF<32)    Recommendation:  -high dose PPI  -given patient clinically stable, would defer endoscopic eval pending resolution of ethanol w/d  -ethanol cessation  -no indication for steroids for alc hep  -trend hgb, LFT

## 2018-05-23 NOTE — PROGRESS NOTE ADULT - SUBJECTIVE AND OBJECTIVE BOX
Patient is a 27y old  Female who presents with a chief complaint of alcohol withdrawal (20 May 2018 20:37)      SUBJECTIVE / OVERNIGHT EVENTS:  pt states she had liquid BM yesterday with scant black material and this am. No abd pain unless presses on her own RLQ.  MEDICATIONS  (STANDING):  chlordiazePOXIDE 25 milliGRAM(s) Oral every 12 hours  chlordiazePOXIDE   Oral   folic acid 1 milliGRAM(s) Oral daily  multivitamin 1 Tablet(s) Oral daily  nicotine - 21 mG/24Hr(s) Patch 1 patch Transdermal daily  pantoprazole  Injectable 40 milliGRAM(s) IV Push two times a day  thiamine IVPB 500 milliGRAM(s) IV Intermittent daily  traZODone 50 milliGRAM(s) Oral at bedtime    MEDICATIONS  (PRN):  benzocaine 15 mG/menthol 3.6 mG Lozenge 1 Lozenge Oral four times a day PRN Sore Throat  LORazepam     Tablet 2 milliGRAM(s) Oral every 2 hours PRN Symptom-triggered 2 point increase in CIWA-Ar for CIWA < 7  LORazepam     Tablet 2 milliGRAM(s) Oral every 2 hours PRN Symptom-triggered 2 point increase in CIWA-Ar  LORazepam   Injectable 2 milliGRAM(s) IV Push every 1 hour PRN Symptom-triggered: each CIWA -Ar score 8 or GREATER  ondansetron Injectable 4 milliGRAM(s) IV Push every 6 hours PRN Nausea              PHYSICAL EXAM:  GENERAL: NAD, well-developed  HEAD:  Atraumatic, Normocephalic  EYES: EOMI, PERRLA, conjunctiva and sclera anicteric  NECK: Supple, No JVD  CHEST/LUNG: Clear to auscultation bilaterally; No wheeze  HEART: Regular rate and rhythm; No murmurs, rubs, or gallops  ABDOMEN: Soft, Nontender, Nondistended; Bowel sounds present, no hepatosplenomegaly, no rebound or guarding  EXTREMITIES:  2+ Peripheral Pulses, No clubbing, cyanosis, or edema  PSYCH: AAOx3  NEUROLOGY: sleepy  SKIN: No rashes or lesion    LABS:                        13.7   7.2   )-----------( 153      ( 23 May 2018 06:38 )             43.6     05-22    138  |  98  |  5<L>  ----------------------------<  80  3.5   |  21<L>  |  0.56    Ca    9.2      22 May 2018 10:52  Phos  2.8     05-22  Mg     1.7     05-22                  RADIOLOGY & ADDITIONAL TESTS:

## 2018-05-23 NOTE — PROGRESS NOTE ADULT - SUBJECTIVE AND OBJECTIVE BOX
Sidney Garcia  PGY-1 Resident Physician   Pager [phone] or [phone] from 7am to 7pm, after 7pm    Patient is a 27y old  Female who presents with a chief complaint of alcohol withdrawal (20 May 2018 20:37)    SUBJECTIVE / OVERNIGHT EVENTS:  No acute overnight events. Patient had anxiety received ativan. Endorsed one episode of vomiting, with black spots. Also endorsed dark colored stools. Patient denied fever, chills, chest pain, abdominal pain, diarrhea, constipation.     MEDICATIONS  (STANDING):  chlordiazePOXIDE 25 milliGRAM(s) Oral every 12 hours  chlordiazePOXIDE   Oral   folic acid 1 milliGRAM(s) Oral daily  multivitamin 1 Tablet(s) Oral daily  nicotine - 21 mG/24Hr(s) Patch 1 patch Transdermal daily  pantoprazole  Injectable 40 milliGRAM(s) IV Push two times a day  thiamine IVPB 500 milliGRAM(s) IV Intermittent daily  traZODone 50 milliGRAM(s) Oral at bedtime    MEDICATIONS  (PRN):  benzocaine 15 mG/menthol 3.6 mG Lozenge 1 Lozenge Oral four times a day PRN Sore Throat  LORazepam     Tablet 2 milliGRAM(s) Oral every 2 hours PRN Symptom-triggered 2 point increase in CIWA-Ar for CIWA < 7  LORazepam     Tablet 2 milliGRAM(s) Oral every 2 hours PRN Symptom-triggered 2 point increase in CIWA-Ar  LORazepam   Injectable 2 milliGRAM(s) IV Push every 1 hour PRN Symptom-triggered: each CIWA -Ar score 8 or GREATER  ondansetron Injectable 4 milliGRAM(s) IV Push every 6 hours PRN Nausea    Allergies    No Known Allergies    Intolerances    Vital Signs Last 24 Hrs  T(C): 36.6 (23 May 2018 05:30), Max: 37.2 (22 May 2018 15:26)  T(F): 97.8 (23 May 2018 05:30), Max: 99 (22 May 2018 20:38)  HR: 87 (23 May 2018 05:30) (80 - 108)  BP: 117/78 (23 May 2018 05:30) (106/65 - 152/108)  RR: 18 (23 May 2018 05:30) (16 - 20)  SpO2: 98% (23 May 2018 05:30) (97% - 100%)  Daily     Daily   CAPILLARY BLOOD GLUCOSE    I&O's Summary    22 May 2018 07:01  -  23 May 2018 07:00  --------------------------------------------------------  IN: 220 mL / OUT: 0 mL / NET: 220 mL    PHYSICAL EXAM:  GENERAL: NAD, well-developed  HEAD:  Atraumatic, Normocephalic  EYES: EOMI, PERRLA, conjunctiva and sclera clear  NECK: Supple, No JVD  CHEST/LUNG: Clear to auscultation bilaterally; No wheeze  HEART: Regular rate and rhythm; Normal S1 S2, No murmurs, rubs, or gallops  ABDOMEN: Soft, Nontender, Nondistended; Bowel sounds present  EXTREMITIES:  2+ Peripheral Pulses, No clubbing, cyanosis, or edema  PSYCH: AAOx3  NEUROLOGY: non-focal  SKIN: No rashes or lesions    LABS:             13.7   7.2   )-----------( 153      ( 23 May 2018 06:38 )             43.6     Hgb Trend: 13.7<--, 13.1<--, 13.1<--, 12.4<--, 12.4<--  05-22    138  |  98  |  5<L>  ----------------------------<  80  3.5   |  21<L>  |  0.56    Ca    9.2      22 May 2018 10:52  Phos  2.8     05-22  Mg     1.7     05-22    Creatinine Trend: 0.56<--, 0.58<--, 0.55<--    RADIOLOGY & ADDITIONAL TESTS:    Imaging Personally Reviewed:    Consultant(s) Notes Reviewed:      Care Discussed with Consultants/Other Providers:

## 2018-05-24 ENCOUNTER — TRANSCRIPTION ENCOUNTER (OUTPATIENT)
Age: 28
End: 2018-05-24

## 2018-05-24 VITALS — SYSTOLIC BLOOD PRESSURE: 114 MMHG | DIASTOLIC BLOOD PRESSURE: 78 MMHG | TEMPERATURE: 98 F | OXYGEN SATURATION: 98 %

## 2018-05-24 LAB
BLD GP AB SCN SERPL QL: NEGATIVE — SIGNIFICANT CHANGE UP
RH IG SCN BLD-IMP: POSITIVE — SIGNIFICANT CHANGE UP

## 2018-05-24 PROCEDURE — 86901 BLOOD TYPING SEROLOGIC RH(D): CPT

## 2018-05-24 PROCEDURE — 86900 BLOOD TYPING SEROLOGIC ABO: CPT

## 2018-05-24 PROCEDURE — 85027 COMPLETE CBC AUTOMATED: CPT

## 2018-05-24 PROCEDURE — 85610 PROTHROMBIN TIME: CPT

## 2018-05-24 PROCEDURE — 43235 EGD DIAGNOSTIC BRUSH WASH: CPT

## 2018-05-24 PROCEDURE — 99285 EMERGENCY DEPT VISIT HI MDM: CPT | Mod: 25

## 2018-05-24 PROCEDURE — 86703 HIV-1/HIV-2 1 RESULT ANTBDY: CPT

## 2018-05-24 PROCEDURE — 83735 ASSAY OF MAGNESIUM: CPT

## 2018-05-24 PROCEDURE — 80048 BASIC METABOLIC PNL TOTAL CA: CPT

## 2018-05-24 PROCEDURE — 80053 COMPREHEN METABOLIC PANEL: CPT

## 2018-05-24 PROCEDURE — 84703 CHORIONIC GONADOTROPIN ASSAY: CPT

## 2018-05-24 PROCEDURE — 85730 THROMBOPLASTIN TIME PARTIAL: CPT

## 2018-05-24 PROCEDURE — 84100 ASSAY OF PHOSPHORUS: CPT

## 2018-05-24 PROCEDURE — 96374 THER/PROPH/DIAG INJ IV PUSH: CPT

## 2018-05-24 PROCEDURE — 86850 RBC ANTIBODY SCREEN: CPT

## 2018-05-24 PROCEDURE — 80307 DRUG TEST PRSMV CHEM ANLYZR: CPT

## 2018-05-24 PROCEDURE — 83690 ASSAY OF LIPASE: CPT

## 2018-05-24 PROCEDURE — 71046 X-RAY EXAM CHEST 2 VIEWS: CPT

## 2018-05-24 PROCEDURE — 99239 HOSP IP/OBS DSCHRG MGMT >30: CPT

## 2018-05-24 RX ORDER — TRAZODONE HCL 50 MG
1 TABLET ORAL
Qty: 10 | Refills: 0
Start: 2018-05-24 | End: 2018-06-02

## 2018-05-24 RX ORDER — TRAZODONE HCL 50 MG
1 TABLET ORAL
Qty: 0 | Refills: 0 | COMMUNITY
Start: 2018-05-24

## 2018-05-24 RX ORDER — NICOTINE POLACRILEX 2 MG
1 GUM BUCCAL DAILY
Qty: 0 | Refills: 0 | Status: DISCONTINUED | OUTPATIENT
Start: 2018-05-24 | End: 2018-05-24

## 2018-05-24 RX ORDER — TRAZODONE HCL 50 MG
50 TABLET ORAL AT BEDTIME
Qty: 0 | Refills: 0 | Status: DISCONTINUED | OUTPATIENT
Start: 2018-05-24 | End: 2018-05-24

## 2018-05-24 RX ORDER — FOLIC ACID 0.8 MG
1 TABLET ORAL DAILY
Qty: 0 | Refills: 0 | Status: DISCONTINUED | OUTPATIENT
Start: 2018-05-24 | End: 2018-05-24

## 2018-05-24 RX ADMIN — Medication 1 MILLIGRAM(S): at 11:30

## 2018-05-24 RX ADMIN — Medication 1 PATCH: at 11:30

## 2018-05-24 RX ADMIN — Medication 1 TABLET(S): at 11:30

## 2018-05-24 RX ADMIN — PANTOPRAZOLE SODIUM 40 MILLIGRAM(S): 20 TABLET, DELAYED RELEASE ORAL at 06:22

## 2018-05-24 RX ADMIN — Medication 2 MILLIGRAM(S): at 11:30

## 2018-05-24 RX ADMIN — Medication 2 MILLIGRAM(S): at 04:58

## 2018-05-24 RX ADMIN — Medication 25 MILLIGRAM(S): at 06:20

## 2018-05-24 NOTE — PROGRESS NOTE ADULT - ATTENDING COMMENTS
Stable H&H with no sign of recurrent bleed  Pending EGD after resolution of withdrawal
no further signs of withdrawal, EGD performed, no findings, no stigmata of recent bleed.  Medically stable for discharge, patient has arranged outpatient rehab for substance abuse.  Total d/c time 35 minutes

## 2018-05-24 NOTE — PROGRESS NOTE ADULT - SUBJECTIVE AND OBJECTIVE BOX
Sidney Garcia  PGY-1 Resident Physician   Pager [phone] or [phone] from 7am to 7pm, after 7pm    Patient is a 27y old  Female who presents with a chief complaint of alcohol withdrawal (20 May 2018 20:37)    SUBJECTIVE / OVERNIGHT EVENTS:  Patient endorsed anxiety, got ativan. Slept well overnight. Patient endorsed abdominal pain RUQ, that comes and goes, not associated with food intake. Patient denied fever, chills, cough, chest pain, diarrhea, constipation.     MEDICATIONS  (STANDING):  chlordiazePOXIDE 25 milliGRAM(s) Oral once  chlordiazePOXIDE   Oral   folic acid 1 milliGRAM(s) Oral daily  multivitamin 1 Tablet(s) Oral daily  nicotine - 21 mG/24Hr(s) Patch 1 patch Transdermal daily  pantoprazole  Injectable 40 milliGRAM(s) IV Push two times a day  traZODone 50 milliGRAM(s) Oral at bedtime    MEDICATIONS  (PRN):  benzocaine 15 mG/menthol 3.6 mG Lozenge 1 Lozenge Oral four times a day PRN Sore Throat  LORazepam     Tablet 2 milliGRAM(s) Oral every 2 hours PRN Symptom-triggered 2 point increase in CIWA-Ar for CIWA < 7  LORazepam     Tablet 2 milliGRAM(s) Oral every 2 hours PRN Symptom-triggered 2 point increase in CIWA-Ar  LORazepam   Injectable 2 milliGRAM(s) IV Push every 1 hour PRN Symptom-triggered: each CIWA -Ar score 8 or GREATER  ondansetron Injectable 4 milliGRAM(s) IV Push every 6 hours PRN Nausea    Allergies  No Known Allergies  Intolerances    Vital Signs Last 24 Hrs  T(C): 37 (24 May 2018 04:59), Max: 37 (24 May 2018 04:59)  T(F): 98.6 (24 May 2018 04:59), Max: 98.6 (24 May 2018 04:59)  HR: 61 (24 May 2018 04:59) (61 - 97)  BP: 141/47 (24 May 2018 04:59) (107/74 - 141/47)  RR: 18 (24 May 2018 04:59) (18 - 18)  SpO2: 98% (24 May 2018 04:59) (98% - 100%)  Daily Height in cm: 170.18 (24 May 2018 07:02)    Daily Weight in k.8 (23 May 2018 09:37)  CAPILLARY BLOOD GLUCOSE      I&O's Summary    23 May 2018 07:01  -  24 May 2018 07:00  --------------------------------------------------------  IN: 820 mL / OUT: 0 mL / NET: 820 mL      PHYSICAL EXAM:  GENERAL: NAD, well-developed  HEAD:  Atraumatic, Normocephalic  EYES: EOMI, PERRLA, conjunctiva and sclera clear  NECK: Supple, No JVD  CHEST/LUNG: Clear to auscultation bilaterally; No wheeze  HEART: Regular rate and rhythm; Normal S1 S2, No murmurs, rubs, or gallops  ABDOMEN: Soft, Nontender, Nondistended; Bowel sounds present  EXTREMITIES:  2+ Peripheral Pulses, No clubbing, cyanosis, or edema  PSYCH: AAOx3  NEUROLOGY: non-focal  SKIN: No rashes or lesions    LABS:                        13.7   7.2   )-----------( 153      ( 23 May 2018 06:38 )             43.6     Hgb Trend: 13.7<--, 13.1<--, 13.1<--, 12.4<--, 12.4<--  0522    138  |  98  |  5<L>  ----------------------------<  80  3.5   |  21<L>  |  0.56    Ca    9.2      22 May 2018 10:52  Phos  2.8     -  Mg     1.7         Creatinine Trend: 0.56<--, 0.58<--, 0.55<--      RADIOLOGY & ADDITIONAL TESTS:    Imaging Personally Reviewed:    Consultant(s) Notes Reviewed:      Care Discussed with Consultants/Other Providers:

## 2018-05-24 NOTE — DISCHARGE NOTE ADULT - ADDITIONAL INSTRUCTIONS
Please follow up with the rehabilitation center for managing your alcohol use.   Please follow up with outpatient psychiatry for managing your anxiety and depression.   Please follow up with your primary care physician for follow up care.

## 2018-05-24 NOTE — DISCHARGE NOTE ADULT - CARE PROVIDER_API CALL
Christian Melgar), Gastroenterology; Internal Medicine  [address] [place] [address]  Phone: [phone]  Fax: [phone]

## 2018-05-24 NOTE — PROGRESS NOTE ADULT - PROBLEM SELECTOR PLAN 2
- Chronic hx of alcohol abuse. Currently daily drinker. Alcohol on admission 259 likely remain intoxicated overnight.  - [facility] symptomatic protocol ordered to prevent withdraw sx.   - Tachycardia resolved after 1L bolus. C/w banana bag 1L at 100cc/h overnight.  - Replete thiamine, folate and MV.  - SW consulted for detox resources.  - Now on a librium taper, tolerating well.

## 2018-05-24 NOTE — PROGRESS NOTE ADULT - SUBJECTIVE AND OBJECTIVE BOX
Pre-Endoscopy Evaluation      Referring Physician:  Dr. Dominique                                   Procedure: EGD    Indication for Procedure: Hematemesis     Pertinent History:    Sedation by Anesthesia [x ]    PAST MEDICAL & SURGICAL HISTORY:  Alcohol abuse  Anxiety  Depression  No significant past surgical history      PMH of Gastroparesis [ ]  Gastric Surgery [ ]  Gastric Outlet Obstruction [ ]    Allergies    No Known Allergies    Intolerances        Latex allergy: [ ] yes [x ] no    Medications:MEDICATIONS  (STANDING):  chlordiazePOXIDE 25 milliGRAM(s) Oral once  chlordiazePOXIDE   Oral   folic acid 1 milliGRAM(s) Oral daily  multivitamin 1 Tablet(s) Oral daily  nicotine - 21 mG/24Hr(s) Patch 1 patch Transdermal daily  pantoprazole  Injectable 40 milliGRAM(s) IV Push two times a day  traZODone 50 milliGRAM(s) Oral at bedtime    MEDICATIONS  (PRN):  benzocaine 15 mG/menthol 3.6 mG Lozenge 1 Lozenge Oral four times a day PRN Sore Throat  LORazepam     Tablet 2 milliGRAM(s) Oral every 2 hours PRN Symptom-triggered 2 point increase in CIWA-Ar for CIWA < 7  LORazepam     Tablet 2 milliGRAM(s) Oral every 2 hours PRN Symptom-triggered 2 point increase in CIWA-Ar  LORazepam   Injectable 2 milliGRAM(s) IV Push every 1 hour PRN Symptom-triggered: each CIWA -Ar score 8 or GREATER  ondansetron Injectable 4 milliGRAM(s) IV Push every 6 hours PRN Nausea      Smoking: [ ] yes  [ ] no    AICD/PPM: [ ] yes   [ ] no    Pertinent lab data:                        13.7   7.2   )-----------( 153      ( 23 May 2018 06:38 )             43.6     05-    138  |  98  |  5<L>  ----------------------------<  80  3.5   |  21<L>  |  0.56    Ca    9.2      22 May 2018 10:52  Phos  2.8       Mg     1.7                         Physical Examination:  Daily Height in cm: 170.18 (24 May 2018 07:02)    Daily Weight in k.8 (23 May 2018 09:37)  Vital Signs Last 24 Hrs  T(C): 36.6 (24 May 2018 07:37), Max: 37 (24 May 2018 04:59)  T(F): 97.8 (24 May 2018 07:37), Max: 98.6 (24 May 2018 04:59)  HR: 93 (24 May 2018 07:37) (61 - 97)  BP: 122/87 (24 May 2018 07:37) (107/74 - 141/47)  BP(mean): --  RR: 18 (24 May 2018 07:37) (18 - 18)  SpO2: 98% (24 May 2018 07:37) (98% - 100%)    BP:                 HR:                  SPO2:               Temperature:    Constitutional: NAD    HEENT: PERRLA, EOMI,       Neck:  No JVD    Respiratory: CTAB/L    Cardiovascular: S1 and S2    Gastrointestinal: BS+, soft, NT/ND    Extremities: No peripheral edema    Neurological: A/O x 3, no focal deficits    Psychiatric: Normal mood, normal affect    : No Bell    Skin: No rashes    Comments:    ASA Class: I [ ]  II [x]  III [ ]  IV [ ]    The patient is a suitable candidate for the planned procedure unless box checked [ ]  No, explain:

## 2018-05-24 NOTE — DISCHARGE NOTE ADULT - HOSPITAL COURSE
26yo F w/ hx of depression, anxiety, substance abuse(xanax, cocaine), alcohol abuse/withdraw (no ICU, seizure hx) presented with hematemesis, concerning for alcohol intoxication and upper GIB. During her hospital stay, patient did not have any more episodes of hematemesis or any episodes of BRBPR. Her hemoglobin was stable at 12.4 to 13.1; she did not need to be transfused. An endoscopy was ordered because of concerns for an upper GIB, suspicious for a Kasey-Diaz tear, gastritis, or ulcers. Endoscopy was normal with no signs of bleed, esophageal varices, or ulcers. Gastroenterolgists suspects a Kasey-Diaz tear that resolved with high dose PPI.         Given the patient's history of alcohol abuse, there were concerns for alcohol withdrawal. CIWA symptomatic protocol was put in place. Over the course of her stay, patient had a CIWA score that ranged from 0 to 6 with an average CIWA score of 2-3. She had mild to moderate anxiety, tremors in her lower extremities, agitation, and nausea. Patient was treated with librium taper, which she tolerated well. She was also given thiamine, folate, and MV. Patient expressed desire to pursue rehabilitation. Social work was consulted for detox resources.     While in the hospital, patient endorsed episodes of mild to moderate anxiety. She was given ativan, which she tolerated well. Patient was advised to follow up as an outpatient for her anxiety.     Patient also endorsed feeling depressed, which she was given Trazodone for. She was advised to follow up as an outpatient for her anxiety. 26yo F w/ hx of depression, anxiety, substance abuse(xanax, cocaine), alcohol abuse/withdraw (no ICU, seizure hx) presented with hematemesis, concerning for alcohol intoxication and upper GIB. During her hospital stay, patient did not have any more episodes of hematemesis or any episodes of BRBPR. Her hemoglobin was stable at 12.4 to 13.1; she did not need to be transfused. An endoscopy was ordered because of concerns for an upper GIB, suspicious for a Kasey-Diaz tear, gastritis, or ulcers. Endoscopy was normal with no signs of bleed, esophageal varices, or ulcers. Gastroenterolgists suspects a Kasey-Diaz tear that resolved with high dose PPI.    Given the patient's history of alcohol abuse, there were concerns for alcohol withdrawal. CIWA symptomatic protocol was put in place. Over the course of her stay, patient had a CIWA score that ranged from 0 to 6 with an average CIWA score of 2-3. She had mild to moderate anxiety, tremors in her lower extremities, agitation, and nausea. Patient was treated with librium taper, which she tolerated well. She was also given thiamine, folate, and MV. Patient expressed desire to pursue rehabilitation. Social work was consulted for detox resources.     While in the hospital, patient endorsed episodes of mild to moderate anxiety. She was given ativan, which she tolerated well. Patient was advised to follow up as an outpatient for her anxiety.     Patient also endorsed feeling depressed, which she was given Trazodone for. She was advised to follow up as an outpatient for her anxiety. 28yo F w/ hx of depression, anxiety, substance abuse(xanax, cocaine), alcohol abuse/withdraw (no ICU, seizure hx) presented with hematemesis, concerning for alcohol intoxication and upper GIB. During her hospital stay, patient did not have any more episodes of hematemesis or any episodes of BRBPR. Her hemoglobin was stable at 12.4 to 13.1; she did not need to be transfused. An endoscopy was ordered because of concerns for an upper GIB, suspicious for a Kasey-Diaz tear, gastritis, or ulcers. Endoscopy was normal with no signs of bleed, esophageal varices, or ulcers. Gastroenterolgists suspects a Kasey-Diaz tear that resolved with high dose PPI.    Given the patient's history of alcohol abuse, there were concerns for alcohol withdrawal. CIWA symptomatic protocol was put in place. Over the course of her stay, patient had a CIWA score that ranged from 0 to 6 with an average CIWA score of 2-3. She had mild to moderate anxiety, tremors in her lower extremities, agitation, and nausea. Patient was treated with librium taper, which she tolerated well. She was also given thiamine, folate, and MV. Patient expressed desire to pursue rehabilitation. Social work was consulted for detox resources.     While in the hospital, patient endorsed episodes of mild to moderate anxiety. She was given ativan, which she tolerated well. Patient was advised to follow up as an outpatient for her anxiety. Patient also endorsed feeling depressed, which she was given Trazodone for. She was advised to follow up as an outpatient for her anxiety.     Patient is medically stable to be discharge, and continue care as an outpatient.

## 2018-05-24 NOTE — PROGRESS NOTE ADULT - PROBLEM SELECTOR PLAN 1
- Patient reported vomiting blood. Likely from upper GI source.   - Hemodynamically stable, hgb stable.   - Pantoprazole 40mg IVP BID ordered.  - GI recs appreciated. Endoscopy on 5/24.

## 2018-05-24 NOTE — DISCHARGE NOTE ADULT - PLAN OF CARE
You were admitted for alcohol withdrawal that manifested as anxiety, tremors, nausea, and agitation. You were on a librium taper that you tolerated well. You were seen by a  to facilitate rehabilitation. You [place] to [place] up with. Please abstain from further alcohol use to prevent future alcohol adverse events. You have a history of anxiety. While here you had episodes of mild to moderate anxiety that was treated with ativan. Ativan helped manage your anxiety. Please follow up with outpatient psychiatry. You have a history of depression. On admission, you endorses feeling depressed, but denied having any thoughts of hurting yourself. You were given Trazodone for your depression. Please follow up with outpatient psychiatry. You were admitted for bloody vomiting. While in the hospital, you vomited once more but did not see any blood. On occasions, you suffered some nausea. You had an endoscopy which did not show an active source of bleeding or esophageal varices. According to the gastroenterologists, they suspect the bleed occurred due to a tear in the esophagus that has now resolved with treatment with a proton pump inhibitor while here. You no longer have to take a proton pump inhibitor. Please follow up outpatient. Follow up with your PCP within two weeks from discharge You were admitted for alcohol withdrawal that manifested as anxiety, tremors, nausea, and agitation. You were on a librium taper that you tolerated well. You were seen by a  to facilitate rehabilitation process. You have been in touch with a rehabilitation facility [place] that you plan to follow up with. Please abstain from further alcohol use to prevent future alcohol adverse events such as damage to your liver. You have a history of anxiety. While here you had episodes of mild to moderate anxiety that was treated with ativan. Ativan helped manage your anxiety while in the hospital. Please follow up with outpatient psychiatry to manage your anxiety as outpatient. You may benefit from using an SSRI.

## 2018-05-24 NOTE — DISCHARGE NOTE ADULT - OTHER SIGNIFICANT FINDINGS
< from: Upper Endoscopy (05.24.18 @ 08:52) >  Findings:       The examined esophagus was normal. No esophageal varices were seen.       The Z-line was regular and was found 40 cm from the incisors. No esophagitis or flavia magdaleno        tears were seen.       The entire examined stomach was normal.       The duodenal bulb and 2nd part of the duodenum were normal.                                                                                                        Impression:          - No active bleeding or stigmata of recent bleeding seen on exam. Likely                        etiology of hematemesis is flavia magdaleno tear which has healed with PPI                        therapy.                       - Normal EGD

## 2018-05-24 NOTE — DISCHARGE NOTE ADULT - CARE PLAN
Principal Discharge DX:	Alcohol withdrawal  Assessment and plan of treatment:	You were admitted for alcohol withdrawal that manifested as anxiety, tremors, nausea, and agitation. You were on a librium taper that you tolerated well. You were seen by a  to facilitate rehabilitation. You have been in touch with a rehabilitation facility [place] that you plan to follow up with. Please abstain from further alcohol use to prevent future alcohol adverse events.  Secondary Diagnosis:	Anxiety  Assessment and plan of treatment:	You have a history of anxiety. While here you had episodes of mild to moderate anxiety that was treated with ativan. Ativan helped manage your anxiety. Please follow up with outpatient psychiatry.  Secondary Diagnosis:	Depression  Assessment and plan of treatment:	You have a history of depression. On admission, you endorses feeling depressed, but denied having any thoughts of hurting yourself. You were given Trazodone for your depression. Please follow up with outpatient psychiatry.  Secondary Diagnosis:	Hematemesis  Assessment and plan of treatment:	You were admitted for bloody vomiting. While in the hospital, you vomited once more but did not see any blood. On occasions, you suffered some nausea. You had an endoscopy which did not show an active source of bleeding or esophageal varices. According to the gastroenterologists, they suspect the bleed occurred due to a tear in the esophagus that has now resolved with treatment with a proton pump inhibitor while here. You no longer have to take a proton pump inhibitor. Please follow up outpatient. Principal Discharge DX:	Alcohol withdrawal  Goal:	Follow up with your PCP within two weeks from discharge  Assessment and plan of treatment:	You were admitted for alcohol withdrawal that manifested as anxiety, tremors, nausea, and agitation. You were on a librium taper that you tolerated well. You were seen by a  to facilitate rehabilitation process. You have been in touch with a rehabilitation facility [place] that you plan to follow up with. Please abstain from further alcohol use to prevent future alcohol adverse events such as damage to your liver.  Secondary Diagnosis:	Anxiety  Assessment and plan of treatment:	You have a history of anxiety. While here you had episodes of mild to moderate anxiety that was treated with ativan. Ativan helped manage your anxiety while in the hospital. Please follow up with outpatient psychiatry to manage your anxiety as outpatient. You may benefit from using an SSRI.  Secondary Diagnosis:	Depression  Assessment and plan of treatment:	You have a history of depression. On admission, you endorses feeling depressed, but denied having any thoughts of hurting yourself. You were given Trazodone for your depression. Please follow up with outpatient psychiatry.  Secondary Diagnosis:	Hematemesis  Assessment and plan of treatment:	You were admitted for bloody vomiting. While in the hospital, you vomited once more but did not see any blood. On occasions, you suffered some nausea. You had an endoscopy which did not show an active source of bleeding or esophageal varices. According to the gastroenterologists, they suspect the bleed occurred due to a tear in the esophagus that has now resolved with treatment with a proton pump inhibitor while here. You no longer have to take a proton pump inhibitor. Please follow up outpatient.

## 2018-05-24 NOTE — DISCHARGE NOTE ADULT - MEDICATION SUMMARY - MEDICATIONS TO TAKE
I will START or STAY ON the medications listed below when I get home from the hospital:    traZODone 50 mg oral tablet  -- 1 tab(s) by mouth once a day (at bedtime)  -- Indication: For Depression    Multiple Vitamins oral tablet  -- 1 tab(s) by mouth once a day  -- Indication: For Vitamin

## 2018-05-24 NOTE — DISCHARGE NOTE ADULT - PATIENT PORTAL LINK FT
You can access the [facility] Patient Portal, offered by [facility], by registering with the following website: [email or web address]

## 2018-11-01 ENCOUNTER — OUTPATIENT (OUTPATIENT)
Dept: OUTPATIENT SERVICES | Facility: HOSPITAL | Age: 28
LOS: 1 days | End: 2018-11-01
Payer: MEDICAID

## 2018-11-01 PROCEDURE — G9001: CPT

## 2018-11-08 DIAGNOSIS — Z71.89 OTHER SPECIFIED COUNSELING: ICD-10-CM

## 2018-11-09 PROBLEM — F10.10 ALCOHOL ABUSE, UNCOMPLICATED: Chronic | Status: ACTIVE | Noted: 2018-05-20

## 2019-03-29 NOTE — PATIENT PROFILE ADULT. - NS TRANSFER DISPOSITION PATIENT BELONGINGS
Received call from Shivani. Informed Shivani,    Vitamin D normal reference range for our lab is 30 and too much can actually become toxic. I would be a bit concerned about someone who is not a medical provider recommending a high dose daily for a patient with no knowledge of their medical background.      He has had some memory concerns and was offered referral to the memory clinic in the past.  Allopurinol is not dosed on a prn basis and is given daily in order to work properly.    Shivani verbally understands.     with patient

## 2020-01-08 NOTE — ED BEHAVIORAL HEALTH ASSESSMENT NOTE - NS ED BHA BILLING ATTENDING WO NP TRAINEE
Avalos [id number]

## 2020-06-17 NOTE — ED PROVIDER NOTE - INPATIENT RESIDENT/ACP NOTIFIED DATE
June 25, 2020    Isamar Mathis  [address] [facility] [address]    Dear MsReema Delfina,    Your procedure for your right long finger is scheduled with Dr. Cornelius Beckman on September 22, 2020 at:    [facility]  [address] [place] [address]  [phone]    Please register at [facility] on September 22, 2020.    Due to OR instrumentation/equipment needs, we are unable to confirm times in advance.You will be contacted on the afternoon of September 18, 2020 with your arrival time. If we are unable to reach you,  a detailed message will be left for you. Please make sure to check your messages.     To better prepare for your surgery, please follow these instructions:    PLEASE SCHEDULE A PREOPERATIVE APPOINTMENT WITH YOUR PRIMARY CARE PHYSICIAN FOR SURGICAL CLEARANCE.  THIS APPOINTMENT NEEDS TO TAKE PLACE BETWEEN AUGUST 25 - SEPTEMBER 8, 2020 TO PROCEED WITH SURGERY.    Do not eat or drink for the 8 hours prior to your surgery.    You will need someone to drive you home and remain with you up to 24 hours after you have been discharged.    The following appointment(s) have been scheduled for you:    Pre-Procedure / Pre-Surgery COVID-19 Testing:    COVID10 testing is scheduled for September 20, 2020 at 3:00 pm, located at [facility].  The testing site is located on [address] just south of [facility],  please follow the signs.  This testing is drive through testing.      Please remember the following instructions for after your test and before surgery:  Once you leave the testing area, immediately go home and remain home until the day of your procedure to reduce your risk of any new exposure.  It is also recommended that members of your household limit their outside contact until your procedure.      Failure to self-isolate during this timeframe increases your risk of new COVID-19 exposure and might result in  cancellation of your procedure.  If you are in need of a work excuse, please contact our office ASAP for assistance.         Post-op with Anjelica Mcmullen NP at the [facility]st [facility], [address] on October 7, 2020 at 3:00 pm .    If you have any work related and/or disability forms that need to be completed, please contact the Forms Completion Department at [phone]. Forms can be dropped off at any of our [facility] locations. Please be advised that it can take 7 to 10 business days to complete these requests.  For questions regarding the procedure, medications, rehab, etc., please contact the nursing staff at [phone] ([facility]).  If you have any scheduling questions or need to reschedule, please contact me at the telephone number and extension listed below.       Thank you,    Anita at [phone]  Surgery Scheduler for Dr. Cornelius Beckman  [facility]    \"Help us grow our quality of service. We want to improve - and you can help us. You may receive a survey either in the mail or via e-mail. This is your opportunity to tell us what we did well, and where we could use some improvement. We value your input.\"                                                              Insurance Authorization Need to Know’s    Prior to your surgical procedure, our team will contact your Insurance Company to initiate a PreAuthorization request.      This is not a guarantee of payment from your insurance company, but rather a step taken to ensure that we have all of the information and documentation for them to confirm the procedure is one that is eligible for coverage under your plan.    We will contact you if we either need more information from you to fulfill the requirements of your insurance company, or if we need to discuss any concerns that may lead to postponement or cancellation of your procedure. If you have any questions regarding your surgery authorization, please  check with your insurance company or call our office for an update.    If you need information regarding your level of benefits or out-of-pocket expenses, please contact your insurance company directly.  They can also confirm for you whether or not we (the surgeon and the hospital/surgery center) are in your plan’s preferred network (aka ‘in-network’).    What to do if… My Insurance Changes:  If, at any time, your insurance company, plan or even card changes… Please call our office so that our team can be sure to update your records.  We will need to make sure to submit any PreAuth or roger to the correct, up-to-date insurance plan.      What to do if… My Insurance Requires A Referral:  If your insurance company requires a Referral for Specialty Care or to see a Specialist, you will need to confirm with them if you have one on file.    - If your insurance carrier does not have a referral, then you will need to contact your Primary Care Physician to have one directly submitted to your insurance company ASAP.    - Without a referral on file, your insurance company will not Pre-Authorize your surgery and may not cover any of your care with our specialty.    What to do if… I have a Workers Compensation (W/C) Claim:  If you have a W/C claim, please be sure to provide our reception team with the information you have regarding your claim ASAP.  We will contact your W/C carrier/adjustor to inform them of your upcoming surgery and check the status of your claim (open vs closed).  We will let you know if they advise of any concerns or issues with your claim.  - Even if you have an open W/C claim, please also provide us with your personal/family insurance.  We will want to be sure this plan is loaded into your account.  We always PreAuthorize with personal insurance as a back-up to W/C.  Otherwise, if W/C doesn’t cover something along the way, you will receive a bill for the services.    What to do if… I have Month-to-Month  Coverage/Premiums:  If you have an insurance plan that is paid for month to month, or is subject to plan change on a monthly basis, please be aware we cannot initiate PreAuthorization until just before the month of your surgery, as your insurance company will need to verify your premium payments/eligibility first.    What to do if… I Do Not Have Insurance Coverage or Have other Insurance/Billing Questions:  Please call our Patient Contact Center:  [phone] to speak with a team member about your billing needs, including possible coverage options, setting up payment plans, our fee schedule, etc.   03-Mar-2017 12:20

## 2020-07-01 NOTE — ED PROVIDER NOTE - MEDICAL DECISION MAKING DETAILS
PERIFCB to patient.    26 y.o. female hx of depression, anxiety and alcohol abuse, father is chronic alcoholic, mother devorced him and lives with mom. Was fired from work as  1 month ago because of heavy drinking and failed detox program 2x. Pt is crying during PE, asking for help, denies SI/HI. LMP 1 month ago. No fever, no chills, no ab pain, no urinary symptoms. Will obtain labs, CIWA protocol, psych consult and admit to medicine. ZR

## 2020-08-19 NOTE — PATIENT PROFILE ADULT. - CAREGIVER PHONE NUMBER
DISCHARGE SUMMARY REPORT  DATE OF ADMISSION:8/18/20  DATE OF DISCHARGE:8/19/20    DISCHARGE DIAGNOSIS: see below    HOSPITAL COURSE: 19 yo F h/o PTSD, recent miscarriage 1 mo ago who p/w abdominal pain x 6d.    PLAN:    # Intractable abdominal pain: unclear etiology  - CT a/p and RUQ US unrevealing  - LFT's and lipase all wnl  - Inflamm markers mildly elevated - unclear significance  - EGD w/ mild esophagitis s/p biopsy and duodenal biopsy to r/o celiac disease  - Continue PPI  - Pt already w/ bentyl and zofran prescription  - Discharged on pantoprazole  - No narcotics on discharge - discussed w/ pt  - Needs close f/u with GI  - Pt given educational info on better dietary choices    # Elevated BP: 2/2 pain. Improved  - Tx underlying pain and monitor BP    # Medical marijuana use:  - Recommended stopping marijuana use        VITALS:  Vital Signs (last 24 hrs)_____ Last Charted___________Minimum____________ Maximum____________  Temp    L 97.1 (AUG 19 10:29) L 97.1 (AUG 19 10:29) 98.8  (AUG 19 08:21)  Heart Rate   L 49 (AUG 19 11:09) L 46 (AUG 19 10:59) 105  (AUG 19 10:05)  Resp Rate       L 13 (AUG 19 11:09) L 13 (AUG 19 11:09) 20  (AUG 18 15:20)  SBP    119  (AUG 19 11:09) 97  (AUG 18 15:20) 165  (AUG 19 10:02)  DBP    76  (AUG 19 11:09) L 57 (AUG 18 15:20) 111  (AUG 19 10:14)      Physical Exam:  General: well developed, alert  HEENT: Normocephalic, atraumatic, mmm  Chest: Symmetric on expansion  Heart: S1, S2, no murmur no gallop  Lungs: Good air entry  Abdomen: obese, BS present, no significant tenderness of abdomen when distracted  Extremities: no edema  Neurologic: no focal deficit    Labs:   Labs (Last four charted values)  WBC                  9.2 (AUG 18)   Hgb                  12.2 (AUG 18)   Hct                  38 (AUG 18)   Plt                  254 (AUG 18)   Na                   140 (AUG 18)   K                    3.9 (AUG 18)   CO2                  25 (AUG 18)   Cl                   107 (AUG 18)   Cr                    0.72 (AUG 18)   BUN                  10 (AUG 18)   Glucose              90 (AUG 18)   Ca                   L 8.3 (AUG 18)      Radiology results:    CT a/p:  IMPRESSION: Appendix is normal.  There are no radiopaque calculi in  the pelvicalyceal systems or ureters on either side, especially the  right.  There are no changes of obstruction or hydronephrosis on  either side.  There is mild, diffuse infiltration of the mesenteric  fat.  This is a nonspecific finding since it can be related to  multiple etiologies, does not show any change from the previous study.  There are numerous small lymph nodes diffusely in the ileocolic  region and mesentery, which may raise the possibility of mesenteric  adenitis, this is also unchanged from the previous study.  Small  amount of free fluid in the low pelvis posteriorly, posterior to the  upper uterus is within normal limits for a female patient of this age.    RUQ US:  IMPRESSION: Normal ultrasound of gallbladder.      Echocardiogram Results: none      PROCEDURE HISTORY: see above      Pending results: none    DISCHARGE MEDICATION LIST   dicyclomine (Bentyl 10 mg oral capsule) 1 cap Oral 4 times a day.  ondansetron (Zofran 4 mg oral tablet) 1 tab(s) Oral every 8 hours.  pantoprazole (pantoprazole 40 mg oral EC tablet) 1 tab(s) Oral 2 times a day for 30 day. Refills: 0.      Allergies: NKA ?    @@  Primary Care Physician   Physician Name: Physician Name: Ravi SOLORIO, Marcos ROBLES    Specialty: Sascha SOLORIO, Hao         @  I spent 35 minutes completing this patient's discharge   [phone]

## 2020-12-09 NOTE — ED PROVIDER NOTE - DISCUSSED CLINICAL AND RADIOLOGICAL FINDINGS WITH, MDM
Spoke with MA in Dr. Annie Mayorga office, they are sending over walk test completed in August patient

## 2021-08-07 NOTE — PROGRESS NOTE BEHAVIORAL HEALTH - NSBHCONSULTFOLLOWAFTERCARE_PSY_A_CORE FT
Abdomen soft, non-tender, no guarding. + bowel sounds. Pt can be given referral for oupt psych services at [facility] on discharge. She wishes to continue substance abuse treatment in [place]

## 2021-09-21 ENCOUNTER — EMERGENCY (EMERGENCY)
Facility: HOSPITAL | Age: 31
LOS: 1 days | Discharge: HOME | End: 2021-09-21
Attending: STUDENT IN AN ORGANIZED HEALTH CARE EDUCATION/TRAINING PROGRAM
Payer: MEDICAID

## 2021-09-21 VITALS
WEIGHT: 139.99 LBS | HEART RATE: 87 BPM | RESPIRATION RATE: 18 BRPM | DIASTOLIC BLOOD PRESSURE: 87 MMHG | HEIGHT: 67 IN | TEMPERATURE: 98 F | OXYGEN SATURATION: 98 % | SYSTOLIC BLOOD PRESSURE: 121 MMHG

## 2021-09-21 PROCEDURE — 99283 EMERGENCY DEPT VISIT LOW MDM: CPT | Mod: 25

## 2021-09-21 PROCEDURE — 10060 I&D ABSCESS SIMPLE/SINGLE: CPT

## 2021-09-22 PROCEDURE — 99283 EMERGENCY DEPT VISIT LOW MDM: CPT | Mod: 25

## 2021-09-22 PROCEDURE — 10060 I&D ABSCESS SIMPLE/SINGLE: CPT

## 2021-09-22 RX ORDER — IBUPROFEN 200 MG
400 TABLET ORAL ONCE
Refills: 0 | Status: COMPLETED | OUTPATIENT
Start: 2021-09-22 | End: 2021-09-22

## 2021-09-22 RX ORDER — ACETAMINOPHEN 500 MG
650 TABLET ORAL ONCE
Refills: 0 | Status: COMPLETED | OUTPATIENT
Start: 2021-09-22 | End: 2021-09-22

## 2021-09-22 RX ORDER — LIDOCAINE HYDROCHLORIDE AND EPINEPHRINE 10; 10 MG/ML; UG/ML
10 INJECTION, SOLUTION INFILTRATION; PERINEURAL ONCE
Refills: 0 | Status: COMPLETED | OUTPATIENT
Start: 2021-09-22 | End: 2021-09-22

## 2021-09-22 RX ADMIN — LIDOCAINE HYDROCHLORIDE AND EPINEPHRINE 10 MILLILITER(S): 10; 10 INJECTION, SOLUTION INFILTRATION; PERINEURAL at 01:03

## 2021-09-22 RX ADMIN — Medication 650 MILLIGRAM(S): at 00:57

## 2021-09-22 RX ADMIN — Medication 400 MILLIGRAM(S): at 01:17

## 2021-09-22 RX ADMIN — Medication 1 TABLET(S): at 01:16

## 2021-09-22 NOTE — ED PROVIDER NOTE - NS ED ROS FT

## 2021-09-22 NOTE — ED PROVIDER NOTE - OBJECTIVE STATEMENT
jeni attending- 32yo f no pmhx pw right buttocks cyst for 8months, reports having it drained previously now having pain and drainage as well. no vaginal bleeding dc rectal bleeding or dc hx of fistulas or anal / rectal tract involvement with cyst. Denies recent trauma, fevers, chills, headache, dizziness, nausea, vomiting, dysuria, freq, hematuria, diarrhea, constipation, chest pain, shortness of breath, cough. declined hiv testing, no hx of UC/Crohns dz

## 2021-09-22 NOTE — ED PROVIDER NOTE - NSFOLLOWUPINSTRUCTIONS_ED_ALL_ED_FT
1. TAKE ALL MEDICATIONS AS DIRECTED.    2. FOR PAIN OR FEVER YOU CAN TAKE IBUPROFEN (MOTRIN, ADVIL) OR ACETAMINOPHEN (TYLENOL) AS NEEDED, AS DIRECTED ON PACKAGING.  3. FOLLOW UP WITH YOUR PRIMARY DOCTOR WITHIN 5 DAYS AS DIRECTED FOR REPEAT WOUND CHECK  4. IF YOU HAD LABS OR IMAGING DONE, YOU WERE GIVEN COPIES OF ALL LABS AND/OR IMAGING RESULTS FROM YOUR ER VISIT--PLEASE TAKE THEM WITH YOU TO YOUR FOLLOW UP APPOINTMENTS.  5. RETURN TO THE ER FOR ANY WORSENING SYMPTOMS OR CONCERNS.      Abscess Incision and Drainage    WHAT YOU NEED TO KNOW:    An abscess incision and drainage (I and D) is a procedure to drain pus from an abscess and clean it out so it can heal.    DISCHARGE INSTRUCTIONS:    Contact your healthcare provider if:   •The area around your abscess has red streaks or is warm and painful.       •You have a fever or chills.       •You have increased redness, swelling, or pain in your wound.      •Your wound does not start to heal after a few days.      •Your abscess returns.       •You have questions or concerns about your condition or care.      Medicines:   •NSAIDs, such as ibuprofen, help decrease swelling, pain, and fever. NSAIDs can cause stomach bleeding or kidney problems in certain people. If you take blood thinner medicine, always ask your healthcare provider if NSAIDs are safe for you. Always read the medicine label and follow directions.      •Take your medicine as directed. Contact your healthcare provider if you think your medicine is not helping or if you have side effects. Tell him or her if you are allergic to any medicine. Keep a list of the medicines, vitamins, and herbs you take. Include the amounts, and when and why you take them. Bring the list or the pill bottles to follow-up visits. Carry your medicine list with you in case of an emergency.      Care for your wound as directed:   •Do not remove your bandage unless your healthcare provider says it is okay. Keep the bandage clean and dry. Remove your bandage and clean the wound once your healthcare provider gives you directions.       •Apply heat on the bandage over your wound for 20 to 30 minutes every 2 hours for as many days as directed. This will increase blood flow to the area and help it heal.      •Elevate your wound above level of your heart as often as you can. This will help decrease swelling and pain. Prop your wounded area on pillows or blankets to keep it elevated comfortably.      Follow up with your healthcare provider as directed: You may need to return in 1 to 3 days to have the gauze in your wound removed and your wound examined. You may be taught how to change the gauze in your wound. Write down your questions so you remember to ask them during your visits.

## 2021-09-22 NOTE — ED ADULT NURSE NOTE - NSICDXFAMILYHX_GEN_ALL_CORE_FT
FAMILY HISTORY:  Sibling  Still living? Unknown  Family history of MS (multiple sclerosis), Age at diagnosis: Age Unknown

## 2021-09-22 NOTE — ED PROVIDER NOTE - PATIENT PORTAL LINK FT
You can access the FollowMyHealth Patient Portal offered by [facility] by registering at the following website: [email or web address]. By joining [employer]s FollowMyHealth portal, you will also be able to view your health information using other applications (apps) compatible with our system.

## 2021-09-22 NOTE — ED PROVIDER NOTE - CLINICAL SUMMARY MEDICAL DECISION MAKING FREE TEXT BOX
jeni attending- 32yo f no pmhx pw right buttocks cyst for 8months now with abscess or infected cyst on right buttocks, will i&d dc with abx wound care Follow up and pmd Follow up return precautions. unlikely fistulization or deep infection or IBD with exam and hx

## 2021-09-22 NOTE — ED PROVIDER NOTE - PHYSICAL EXAMINATION
Physical Exam:  Gen: NAD, AOx3, non-toxic appearing  Head: normal appearing  HEENT: normal conjunctiva, oral mucosa moist  Lung:  no respiratory distress, speaking in full sentences, clear to ascultation bilaterally     CV: regular rate and rhythm   Abd: soft, ND, NT  MSK: no visible deformities  buttocks- chaperone dr rodriguez- right buttocks abscess 2x2 cm draining purulent fluid ttp mild surrounding erythema without crepitus, no rectal involvement or rectal drainage   Neuro: No focal deficits  Skin: Warm  Psych: normal affect  ~Amadou Horowitz D.O. -ED Attending

## 2021-09-22 NOTE — ED ADULT NURSE NOTE - OBJECTIVE STATEMENT
32 y/o female presents to ED from home c/o R sided gluteal cyst x 8 months that is now "turning black."

## 2021-09-22 NOTE — ED PROCEDURE NOTE - ATTENDING CONTRIBUTION TO CARE
I, Amadou Horowitz, performed a history and physical exam of the patient and discussed their management with the resident and/or advanced care provider. I reviewed the resident and/or advanced care provider's note and agree with the documented findings and plan of care. I was present and available for all procedures.

## 2021-09-22 NOTE — ED PROVIDER NOTE - ATTENDING CONTRIBUTION TO CARE
I, Amadou Horowitz, performed a history and physical exam of the patient and discussed their management with the resident and/or advanced care provider. I reviewed the resident and/or advanced care provider's note and agree with the documented findings and plan of care. I was present and available for all procedures.    see my full note for details

## 2021-09-22 NOTE — ED PROVIDER NOTE - PROGRESS NOTE DETAILS
sp i&d patient feeling better, [place] ED Attending- Patient feels well, tolerating PO. Discussed  findings with patient. Patient feels comfortable going home. Gave home care and follow up instructions. Discussed which symptoms to look out for and when to return to the ED for further evaluation. Patient given opportunity to ask questions about their medical condition and had all questions answered.

## 2022-02-21 NOTE — PATIENT PROFILE ADULT. - SURGICAL SITE INCISION
Giuseppe Norwood returns today for follow up visit with ongoing pain.    HPI:  Treatment since last visit: MRI, manipulation  Location of symptoms: neck.  Improvement of symptoms: No .   Percentage of improvement .  New numbness, tingling, weakness No.  New bowel, bladder, balance changes: No.  Pain score 8/10.  OMT WAS HIGHLY THERAPEUTIC INITIALLY X 1 HOUR - COMPLETE RELIEF X 1 HOUR    ROS:   Reviewed as in HPI. All other ROS are negative.    PMH:  Patient Active Problem List   Diagnosis   • Lumbar back pain with radiculopathy affecting right lower extremity   • Chronic left shoulder pain   • Tobacco abuse   • DDD (degenerative disc disease), lumbar   • HNP (herniated nucleus pulposus), lumbar   • Lumbar discogenic pain syndrome   • Verruca   • Metatarsalgia of both feet   • Essential hypertension   • Regular astigmatism of left eye   • Photophobia of both eyes   • Other, mixed, or unspecified nondependent drug abuse, unspecified   • Other convulsions   • Other chronic allergic conjunctivitis   • Myopia   • Light sensitivity   • Infectious mononucleosis   • Depressive disorder, not elsewhere classified   • Allergic rhinitis   • Alcohol abuse, episodic   • Right elbow pain   • Lateral epicondylitis of right elbow   • Muscle tightness   • Muscle weakness   • Displacement of cervical intervertebral disc without myelopathy       ALLERGIES:   ALLERGIES:  Patient has no known allergies.    Current Outpatient Medications   Medication Sig Last Dose   • lisinopril (ZESTRIL) 5 MG tablet Take 1 tablet by mouth daily.    • cyclobenzaprine (FLEXERIL) 5 MG tablet TAKE 1 TO 2 TABLETS BY MOUTH EVERY NIGHT AS NEEDED FOR MUSCLE SPASM    • naproxen (NAPROSYN) 500 MG tablet Take 1 tablet by mouth 2 times daily as needed (pain).    • cetirizine (ZyrTEC) 5 MG tablet Take 5 mg by mouth daily.      No current facility-administered medications for this visit.         PHYSICAL EXAMINATION:  There were no vitals filed for this visit.    GENERAL: Well nourished, well developed. Awake, alert and oriented. NO DISTRESS  MUSCULOSKELETAL:   Gait: STABLE  BILATERAL ROTATION AND SIDEBENDING POS RIGHT NECK  SPURLING NEG  FULL AROM MYCHAL SHOULDERS - PAIN TO THE LEFT SHOULDER WITH REACHING OUT  No acute neurological changes.    ASSESSMENT:  1. RIGHT AXIAL NECK PAIN  1. CHRONIC X 8 YEARS AND RECENTLY PROGRESSED  2. LEFT SHOULDER/ARM PAIN  3. CERVICAL - NEURO INTACT  4. MRI C-SPINE 2022  1. C3/4 RIGHT HNP AND CIRCUMSCRIBED SIGNAL C/W FACET JOINT CYST - T2 AXIAL 21/92        PLAN:  Etiology of the patient's complaints was discussed. Appropriate diagnostics and therapeutics were offered.  1. DARLYN RETURNS FOR MRI REVIEW  2. HE HAS AN ISOLATED LEVEL OF PATHOLOGY = C3/4  3. I OFFER A NEUROSURGICAL REFERRAL BUT HE DECLINES  I RECOMMEND AGGRESSIVE TARGETED ANTI-INFLAMMATORY THERAPY VIA CONSERVATIVE INTERVENTIONAL MANAGEMENT.  PATIENT WISHES TO PROCEED.  RISKS AND BENEFITS WERE DISCUSSED.  RISKS AND BENEFITS OF INTERVENTIONAL MANAGEMENT (INJECTION THERAPIES) WERE DISCUSSED AND REVIEWED.  THE OBJECTIVE OF TREATMENT IS TO DECREASE PAIN AND INCREASE FUNCTIONALITY.  IF STEROID MEDICATION IS UTILIZED, THE PRIMARY THERAPEUTIC EFFECT IS ANTI-INFLAMMATORY IN NATURE.  THESE TREATMENTS CANNOT \"FIX\" THE PROBLEM.  INHERENT RISKS OF ANY INJECTION TREATMENT INCLUDE INFECTION, BLEEDING, INCREASED PAIN, ADVERSE EFFECTS TO BLOOD SUGAR CONTROL (DIABETES), ADVERSE EFFECTS TO BONE DENSITY (OSTEOPOROSIS) NERVE DAMAGE, PARALYSIS AND DEATH.  ADDITIONAL RISKS ASSOCIATED WITH SPINAL INJECTIONS INCLUDE SPINAL HEADACHE AND PNEUMOTHORAX.  TREATMENT FOR SPINAL HEADACHE INCLUDES REST, FLUIDS AND LAYING DOWN.  MORE AGGRESSIVELY, SPINAL HEADACHE CAN BE TREATED WITH AN EPIDURAL BLOOD PATCH.  RIGHT C3/4 FACET JOINT STEROID INJECTION  CONSIDER CERVICAL MASHA  CAUTION WITH EXTREMES OF CERVICAL MOTION, AS WELL AS HEAVY LIFTING, PUSHING, PULLING  MAINTAIN SMOKING CESSATION    Greater than 25 minutes were  spent with the patient and in review of medical records of which greater than half the time was spent in counseling and coordination of care.    Patient to call my office if they have any questions, concerns or problems.   Follow up: [facility]  SIGNED: Mario Alberto Acosta DO  2/21/2022  11:30 AM             no

## 2022-03-21 NOTE — ED ADULT TRIAGE NOTE - ACCOMPANIED BY
From: Ernesto Cantuvalencia  To: Omi Barrera  Sent: 3/19/2022 2:23 PM CDT  Subject: Bumps surgery     Lalit springer,     My wife Kathy is going to come with me to the appointment. Can I please have a relaxing pill or something? It’s going to be hard to see 5 cuts….    Thanks,     Ar.   
prescription entered  
Self

## 2022-09-07 NOTE — ED BEHAVIORAL HEALTH ASSESSMENT NOTE - AFFECT CONGRUENCE
Subjective  Patient seen and examined.  No further tarry stools . Ok to resume elequis per gi . Lower ext swelling still there    Review of Systems   Constitutional: Negative for chills and fever.   Eyes: Negative for visual disturbance.   Cardiovascular: Positive for leg swelling. Negative for chest pain and palpitations.   Gastrointestinal: Negative for abdominal pain, constipation, diarrhea and nausea.   Neurological: Negative for dizziness, weakness and headaches.   Psychiatric/Behavioral: Negative for agitation and confusion.       Objective    Last Recorded Vitals  Blood pressure 118/71, pulse 67, temperature 97.7 °F (36.5 °C), temperature source Oral, resp. rate 18, height 4' 11\" (1.499 m), weight 69 kg (152 lb 1.9 oz), SpO2 98 %.  Body mass index is 30.72 kg/m².    Physical Exam  Vitals reviewed.   Constitutional:       General: She is not in acute distress.     Appearance: Normal appearance.   HENT:      Head: Normocephalic.      Mouth/Throat:      Mouth: Mucous membranes are moist.      Neck: Neck supple.   Eyes:      Conjunctiva/sclera: Conjunctivae normal.   Cardiovascular:      Rate and Rhythm: Normal rate and regular rhythm.      Heart sounds: Normal heart sounds. No murmur heard.  Pulmonary:      Effort: No respiratory distress.      Breath sounds: No wheezing.   Abdominal:      General: There is no distension.      Tenderness: There is no abdominal tenderness. There is no guarding or rebound.   Musculoskeletal:         General: Swelling present.      Right lower leg: Edema present.      Left lower leg: Edema present.      Comments: 2 Plus pitting edema both lower extremities   Skin:     Findings: No bruising or rash.   Neurological:      General: No focal deficit present.      Mental Status: She is alert.   Psychiatric:         Mood and Affect: Mood normal.         Behavior: Behavior normal.           Intake/Output Summary (Last 24 hours) at 9/7/2022 1310  Last data filed at 9/6/2022 1800  Gross per  24 hour   Intake 610 ml   Output --   Net 610 ml        Labs   Recent Results (from the past 24 hour(s))   GLUCOSE, BEDSIDE - POINT OF CARE    Collection Time: 09/06/22  5:06 PM   Result Value Ref Range    GLUCOSE, BEDSIDE - POINT OF CARE 129 (H) 70 - 99 mg/dL   GLUCOSE, BEDSIDE - POINT OF CARE    Collection Time: 09/06/22  8:35 PM   Result Value Ref Range    GLUCOSE, BEDSIDE - POINT OF CARE 241 (H) 70 - 99 mg/dL   Basic Metabolic Panel    Collection Time: 09/07/22  6:29 AM   Result Value Ref Range    Fasting Status      Sodium 134 (L) 135 - 145 mmol/L    Potassium 4.8 3.4 - 5.1 mmol/L    Chloride 106 97 - 110 mmol/L    Carbon Dioxide 27 21 - 32 mmol/L    Anion Gap 6 (L) 7 - 19 mmol/L    Glucose 160 (H) 70 - 99 mg/dL    BUN 31 (H) 6 - 20 mg/dL    Creatinine 1.14 (H) 0.51 - 0.95 mg/dL    Glomerular Filtration Rate 49 (L) >=60    BUN/ Creatinine Ratio 27 (H) 7 - 25    Calcium 8.2 (L) 8.4 - 10.2 mg/dL   CBC No Differential    Collection Time: 09/07/22  6:29 AM   Result Value Ref Range    WBC 5.2 4.2 - 11.0 K/mcL    RBC 2.97 (L) 4.00 - 5.20 mil/mcL    HGB 8.6 (L) 12.0 - 15.5 g/dL    HCT 27.1 (L) 36.0 - 46.5 %    MCV 91.2 78.0 - 100.0 fl    MCH 29.0 26.0 - 34.0 pg    MCHC 31.7 (L) 32.0 - 36.5 g/dL     140 - 450 K/mcL    RDW-CV 15.5 (H) 11.0 - 15.0 %    RDW-SD 51.8 (H) 39.0 - 50.0 fL    NRBC 0 <=0 /100 WBC   GLUCOSE, BEDSIDE - POINT OF CARE    Collection Time: 09/07/22  8:00 AM   Result Value Ref Range    GLUCOSE, BEDSIDE - POINT OF CARE 140 (H) 70 - 99 mg/dL   GLUCOSE, BEDSIDE - POINT OF CARE    Collection Time: 09/07/22 11:25 AM   Result Value Ref Range    GLUCOSE, BEDSIDE - POINT OF CARE 203 (H) 70 - 99 mg/dL        Imaging  No results found.    EGD   Final Result          LAST ECHO/ECHO STRESS:  No valid procedures specified.  Results for orders placed or performed during the hospital encounter of 09/04/22   ECG   Result Value Ref Range    Ventricular Rate EKG/Min (BPM) 76     Atrial Rate (BPM) 77      QRS-Interval (MSEC) 106     QT-Interval (MSEC) 410     QTc 461     R Axis (Degrees) -21     T Axis (Degrees) 69     REPORT TEXT       Atrial fibrillation  Abnormal ECG  When compared with ECG of  20-AUG-2022 18:53,  ST no longer depressed in  Lateral leads  Nonspecific T wave abnormality has replaced inverted T waves in  Lateral leads  Confirmed by VIKKI PETERS MD ([id number]) on 9/5/2022 10:35:08 AM      \"}      Assessment and Plan    77 yo female with cad, afib on eliquis and asa with recent hospital admission for GI bleed 2/2 AVM, possible dieulafoy presenting with 1 episode of melena yesterday. Pt states that she was not dizzy or lightheaded, but was worried because she was told on discharge to monitor for black stool. She came to ED for further evaluation. No significant abdominal pain. No further BM since admission. Hgb 7.1.      EGD 8/22/22 showed gastritis, multiple oozing gastric angioectasias/erosions s/p APC which was successful, one oozing gastric angioectasia vs diulafoy s/p 2 clips and APC which was successful.     Melena  - EGD 8/22/22 on known history of AVM, PUD, gastritis,     . HgB and HCT is within accepted range.    . GI consulted,     . Continue PPI IV bid     . Plan for EGD     . Ok with clear liquid       Patient was on plavix last admission but was not discharged on plavix   09/06: c/o  of tarry stools awaiting to go for EGD     Egd: Non-bleeding gastric ulcers with no stigmata of bleeding from previous APC treatment  - Normal duodenal bulb and second portion of the duodenum.  IV BID PPI- will need to resume PO BID PPI for 8 weeks  Per gi - hold eliquis for now, can resume tomorrow if no signs of bleeding  09/07; on elequis . Cleared by gi for elequis. No further black stools     Symptomatic normocytic anemia with a history of chronic anemia  -No overt bleeding  -H&H on [place] 8/25-upon this admission it was 6/21   - S/P one unit of pRBC on 8/20- and one today  -No overt bleeding, patient  denied hematochezia hematemesis or melena  -FOBT is positive   - iron profile thyroid profile B12/folate level.ferritin level all reviewed   - PPI IV  - EGD tomorrow   09/06:  Egd: Non-bleeding gastric ulcers with no stigmata of bleeding from previous APC treatment  - Normal duodenal bulb and second portion of the duodenum.  IV BID PPI- will need to resume PO BID PPI for 8 weeks  Per gi - hold eliquis for now, can resume tomorrow if no signs of bleeding  09/07; on elequis. Monitor hb       chronic diastolic chf    hypertrophic cardiomyopathy confirmed by MRI  -proBNP on admission 12.274.  -volume status improved  -decrease Lasix  To 20 mg daily  -Monitor electrolytes urine output  -Strict intake and outtake  -Resume metoprolol for rate control.  09/06: Patient does have lower extremity pitting edema we will resume home dose of torsemide as she is not actively bleeding advised lower extremity elevation all the time and to cut down the salt and water intake     A. Fib  -Currently she is an A. fib rate is well controlled  -I will resume metoprolol  - continue holding Eliquis, asa   09/07: resume elequis      Moderate aortic valve stenosis and mild mitral valve stenosis.  Patient had right heart cath on 726 which was significant for wedge pressure of 24 and mild mitral stenosis PA pressure was 54/24.        History of coronary disease  -No evidence of acute coronary syndrome  -Troponin is negative  -EKG showed A. fib with rapid ventricular response and marked ST abnormalities possible lateral injury at 60 changes seen per the EKG done in July is a 19.  -continue atorvastatin metoprolol        Insulin-dependent diabetes  -Blood glucose is  within hospital range  -Patient is on Lantus 17 units at night and 3 units with meal  -start Lantus 10 units -Continue sliding scale and adjust as needed.   09/07: blood sugars mildly elevated     Hypothyroidism  -Continue home dose levothyroxine     Hyperlipidemia continue  atorvastatin        DVT Prophylaxis: elequis        CODE STATUS: FULL CODE    Code Status: Full Resuscitation     D/w leticia jang and alfonzo olsen   DVT Prophylaxis      Disposition:         Congruent

## 2023-02-20 NOTE — ED ADULT NURSE NOTE - NSALCOHOLQUITREADY_GEN_A_CORE_SD
----- Message from Yolette Valdes sent at 2/20/2023 10:52 AM CST -----  Regarding: vit d and flonase  Patient says he saw Dr Mary few weeks ago and he was supposed to have vit d called into [facility] but it wasn't called in. Also doc told him to get flonase and pharm told patient it may be cheaper if script for flonase was sent. Call patient back at [phone]     not motivated to quit

## 2023-08-03 NOTE — ED PROVIDER NOTE - CPE EDP GASTRO NORM
Thank you so much [place]. Who was it in billing in case there is another issu? I won't have to bother you then. normal...

## 2023-09-22 ENCOUNTER — INPATIENT (INPATIENT)
Facility: HOSPITAL | Age: 33
LOS: 2 days | Discharge: HOME | DRG: 897 | End: 2023-09-25
Attending: STUDENT IN AN ORGANIZED HEALTH CARE EDUCATION/TRAINING PROGRAM | Admitting: STUDENT IN AN ORGANIZED HEALTH CARE EDUCATION/TRAINING PROGRAM
Payer: MEDICAID

## 2023-09-22 VITALS
TEMPERATURE: 98 F | OXYGEN SATURATION: 99 % | RESPIRATION RATE: 20 BRPM | DIASTOLIC BLOOD PRESSURE: 95 MMHG | WEIGHT: 145.06 LBS | HEART RATE: 126 BPM | SYSTOLIC BLOOD PRESSURE: 157 MMHG | HEIGHT: 67 IN

## 2023-09-22 DIAGNOSIS — F17.200 NICOTINE DEPENDENCE, UNSPECIFIED, UNCOMPLICATED: ICD-10-CM

## 2023-09-22 DIAGNOSIS — F10.139 ALCOHOL ABUSE WITH WITHDRAWAL, UNSPECIFIED: ICD-10-CM

## 2023-09-22 DIAGNOSIS — F41.9 ANXIETY DISORDER, UNSPECIFIED: ICD-10-CM

## 2023-09-22 DIAGNOSIS — F10.10 ALCOHOL ABUSE, UNCOMPLICATED: ICD-10-CM

## 2023-09-22 DIAGNOSIS — R10.9 UNSPECIFIED ABDOMINAL PAIN: ICD-10-CM

## 2023-09-22 DIAGNOSIS — Z29.9 ENCOUNTER FOR PROPHYLACTIC MEASURES, UNSPECIFIED: ICD-10-CM

## 2023-09-22 DIAGNOSIS — F32.9 MAJOR DEPRESSIVE DISORDER, SINGLE EPISODE, UNSPECIFIED: ICD-10-CM

## 2023-09-22 DIAGNOSIS — M54.2 CERVICALGIA: ICD-10-CM

## 2023-09-22 LAB
ALBUMIN SERPL ELPH-MCNC: 4.4 G/DL — SIGNIFICANT CHANGE UP (ref 3.3–5)
ALP SERPL-CCNC: 87 U/L — SIGNIFICANT CHANGE UP (ref 40–120)
ALT FLD-CCNC: 15 U/L — SIGNIFICANT CHANGE UP (ref 10–45)
AMPHET UR-MCNC: NEGATIVE — SIGNIFICANT CHANGE UP
ANION GAP SERPL CALC-SCNC: 15 MMOL/L — SIGNIFICANT CHANGE UP (ref 5–17)
APPEARANCE UR: CLEAR — SIGNIFICANT CHANGE UP
AST SERPL-CCNC: 28 U/L — SIGNIFICANT CHANGE UP (ref 10–40)
BACTERIA # UR AUTO: ABNORMAL
BARBITURATES UR SCN-MCNC: NEGATIVE — SIGNIFICANT CHANGE UP
BASE EXCESS BLDV CALC-SCNC: 3.5 MMOL/L — HIGH (ref -2–3)
BASOPHILS # BLD AUTO: 0.08 K/UL — SIGNIFICANT CHANGE UP (ref 0–0.2)
BASOPHILS NFR BLD AUTO: 0.8 % — SIGNIFICANT CHANGE UP (ref 0–2)
BENZODIAZ UR-MCNC: NEGATIVE — SIGNIFICANT CHANGE UP
BILIRUB DIRECT SERPL-MCNC: 0.2 MG/DL — SIGNIFICANT CHANGE UP (ref 0–0.3)
BILIRUB INDIRECT FLD-MCNC: 0.5 MG/DL — SIGNIFICANT CHANGE UP (ref 0.2–1)
BILIRUB SERPL-MCNC: 0.7 MG/DL — SIGNIFICANT CHANGE UP (ref 0.2–1.2)
BILIRUB UR-MCNC: NEGATIVE — SIGNIFICANT CHANGE UP
BUN SERPL-MCNC: 7 MG/DL — SIGNIFICANT CHANGE UP (ref 7–23)
CA-I SERPL-SCNC: 1.14 MMOL/L — LOW (ref 1.15–1.33)
CALCIUM SERPL-MCNC: 8.8 MG/DL — SIGNIFICANT CHANGE UP (ref 8.4–10.5)
CHLORIDE BLDV-SCNC: 100 MMOL/L — SIGNIFICANT CHANGE UP (ref 96–108)
CHLORIDE SERPL-SCNC: 101 MMOL/L — SIGNIFICANT CHANGE UP (ref 96–108)
CO2 BLDV-SCNC: 29 MMOL/L — HIGH (ref 22–26)
CO2 SERPL-SCNC: 20 MMOL/L — LOW (ref 22–31)
COCAINE METAB.OTHER UR-MCNC: NEGATIVE — SIGNIFICANT CHANGE UP
COLOR SPEC: YELLOW — SIGNIFICANT CHANGE UP
CREAT SERPL-MCNC: 0.58 MG/DL — SIGNIFICANT CHANGE UP (ref 0.5–1.3)
DIFF PNL FLD: ABNORMAL
EGFR: 122 ML/MIN/1.73M2 — SIGNIFICANT CHANGE UP
EOSINOPHIL # BLD AUTO: 0.3 K/UL — SIGNIFICANT CHANGE UP (ref 0–0.5)
EOSINOPHIL NFR BLD AUTO: 2.9 % — SIGNIFICANT CHANGE UP (ref 0–6)
EPI CELLS # UR: 5 /HPF — SIGNIFICANT CHANGE UP
ETHANOL SERPL-MCNC: <10 MG/DL — SIGNIFICANT CHANGE UP (ref 0–10)
GAS PNL BLDV: 134 MMOL/L — LOW (ref 136–145)
GAS PNL BLDV: SIGNIFICANT CHANGE UP
GAS PNL BLDV: SIGNIFICANT CHANGE UP
GLUCOSE BLDV-MCNC: 101 MG/DL — HIGH (ref 70–99)
GLUCOSE SERPL-MCNC: 99 MG/DL — SIGNIFICANT CHANGE UP (ref 70–99)
GLUCOSE UR QL: NEGATIVE — SIGNIFICANT CHANGE UP
HCG SERPL-ACNC: <2 MIU/ML — SIGNIFICANT CHANGE UP
HCG SERPL-ACNC: <2 MIU/ML — SIGNIFICANT CHANGE UP
HCO3 BLDV-SCNC: 28 MMOL/L — SIGNIFICANT CHANGE UP (ref 22–29)
HCT VFR BLD CALC: 38.9 % — SIGNIFICANT CHANGE UP (ref 34.5–45)
HCT VFR BLDA CALC: 40 % — SIGNIFICANT CHANGE UP (ref 34.5–46.5)
HGB BLD CALC-MCNC: 13.4 G/DL — SIGNIFICANT CHANGE UP (ref 11.7–16.1)
HGB BLD-MCNC: 12.9 G/DL — SIGNIFICANT CHANGE UP (ref 11.5–15.5)
HYALINE CASTS # UR AUTO: 1 /LPF — SIGNIFICANT CHANGE UP (ref 0–2)
IMM GRANULOCYTES NFR BLD AUTO: 0.4 % — SIGNIFICANT CHANGE UP (ref 0–0.9)
KETONES UR-MCNC: SIGNIFICANT CHANGE UP
LACTATE BLDV-MCNC: 1.4 MMOL/L — SIGNIFICANT CHANGE UP (ref 0.5–2)
LEUKOCYTE ESTERASE UR-ACNC: NEGATIVE — SIGNIFICANT CHANGE UP
LIDOCAIN IGE QN: 22 U/L — SIGNIFICANT CHANGE UP (ref 7–60)
LYMPHOCYTES # BLD AUTO: 0.97 K/UL — LOW (ref 1–3.3)
LYMPHOCYTES # BLD AUTO: 9.4 % — LOW (ref 13–44)
MAGNESIUM SERPL-MCNC: 2 MG/DL — SIGNIFICANT CHANGE UP (ref 1.6–2.6)
MCHC RBC-ENTMCNC: 30.6 PG — SIGNIFICANT CHANGE UP (ref 27–34)
MCHC RBC-ENTMCNC: 33.2 GM/DL — SIGNIFICANT CHANGE UP (ref 32–36)
MCV RBC AUTO: 92.2 FL — SIGNIFICANT CHANGE UP (ref 80–100)
METHADONE UR-MCNC: NEGATIVE — SIGNIFICANT CHANGE UP
MONOCYTES # BLD AUTO: 0.65 K/UL — SIGNIFICANT CHANGE UP (ref 0–0.9)
MONOCYTES NFR BLD AUTO: 6.3 % — SIGNIFICANT CHANGE UP (ref 2–14)
NEUTROPHILS # BLD AUTO: 8.26 K/UL — HIGH (ref 1.8–7.4)
NEUTROPHILS NFR BLD AUTO: 80.2 % — HIGH (ref 43–77)
NITRITE UR-MCNC: NEGATIVE — SIGNIFICANT CHANGE UP
NRBC # BLD: 0 /100 WBCS — SIGNIFICANT CHANGE UP (ref 0–0)
OPIATES UR-MCNC: NEGATIVE — SIGNIFICANT CHANGE UP
OXYCODONE UR-MCNC: NEGATIVE — SIGNIFICANT CHANGE UP
PCO2 BLDV: 40 MMHG — SIGNIFICANT CHANGE UP (ref 39–42)
PCP SPEC-MCNC: SIGNIFICANT CHANGE UP
PCP UR-MCNC: NEGATIVE — SIGNIFICANT CHANGE UP
PH BLDV: 7.45 — HIGH (ref 7.32–7.43)
PH UR: 7 — SIGNIFICANT CHANGE UP (ref 5–8)
PHOSPHATE SERPL-MCNC: 2.6 MG/DL — SIGNIFICANT CHANGE UP (ref 2.5–4.5)
PLATELET # BLD AUTO: 298 K/UL — SIGNIFICANT CHANGE UP (ref 150–400)
PO2 BLDV: 46 MMHG — HIGH (ref 25–45)
POTASSIUM BLDV-SCNC: 4 MMOL/L — SIGNIFICANT CHANGE UP (ref 3.5–5.1)
POTASSIUM SERPL-MCNC: 4 MMOL/L — SIGNIFICANT CHANGE UP (ref 3.5–5.3)
POTASSIUM SERPL-SCNC: 4 MMOL/L — SIGNIFICANT CHANGE UP (ref 3.5–5.3)
PROT SERPL-MCNC: 7.2 G/DL — SIGNIFICANT CHANGE UP (ref 6–8.3)
PROT UR-MCNC: SIGNIFICANT CHANGE UP
RBC # BLD: 4.22 M/UL — SIGNIFICANT CHANGE UP (ref 3.8–5.2)
RBC # FLD: 12.3 % — SIGNIFICANT CHANGE UP (ref 10.3–14.5)
RBC CASTS # UR COMP ASSIST: 12 /HPF — HIGH (ref 0–4)
SAO2 % BLDV: 75.3 % — SIGNIFICANT CHANGE UP (ref 67–88)
SODIUM SERPL-SCNC: 136 MMOL/L — SIGNIFICANT CHANGE UP (ref 135–145)
SP GR SPEC: 1.02 — SIGNIFICANT CHANGE UP (ref 1.01–1.02)
THC UR QL: POSITIVE
UROBILINOGEN FLD QL: NEGATIVE — SIGNIFICANT CHANGE UP
WBC # BLD: 10.3 K/UL — SIGNIFICANT CHANGE UP (ref 3.8–10.5)
WBC # FLD AUTO: 10.3 K/UL — SIGNIFICANT CHANGE UP (ref 3.8–10.5)
WBC UR QL: 2 /HPF — SIGNIFICANT CHANGE UP (ref 0–5)

## 2023-09-22 PROCEDURE — 76705 ECHO EXAM OF ABDOMEN: CPT | Mod: 26

## 2023-09-22 PROCEDURE — 74177 CT ABD & PELVIS W/CONTRAST: CPT | Mod: 26

## 2023-09-22 PROCEDURE — 99223 1ST HOSP IP/OBS HIGH 75: CPT | Mod: GC

## 2023-09-22 PROCEDURE — 99285 EMERGENCY DEPT VISIT HI MDM: CPT

## 2023-09-22 RX ORDER — ACETAMINOPHEN 500 MG
650 TABLET ORAL EVERY 6 HOURS
Refills: 0 | Status: DISCONTINUED | OUTPATIENT
Start: 2023-09-22 | End: 2023-09-25

## 2023-09-22 RX ORDER — ONDANSETRON 8 MG/1
4 TABLET, FILM COATED ORAL ONCE
Refills: 0 | Status: COMPLETED | OUTPATIENT
Start: 2023-09-22 | End: 2023-09-22

## 2023-09-22 RX ORDER — METRONIDAZOLE 500 MG
500 TABLET ORAL EVERY 8 HOURS
Refills: 0 | Status: DISCONTINUED | OUTPATIENT
Start: 2023-09-23 | End: 2023-09-23

## 2023-09-22 RX ORDER — LANOLIN ALCOHOL/MO/W.PET/CERES
3 CREAM (GRAM) TOPICAL AT BEDTIME
Refills: 0 | Status: DISCONTINUED | OUTPATIENT
Start: 2023-09-22 | End: 2023-09-25

## 2023-09-22 RX ORDER — FOLIC ACID 0.8 MG
1 TABLET ORAL DAILY
Refills: 0 | Status: DISCONTINUED | OUTPATIENT
Start: 2023-09-22 | End: 2023-09-25

## 2023-09-22 RX ORDER — CIPROFLOXACIN LACTATE 400MG/40ML
VIAL (ML) INTRAVENOUS
Refills: 0 | Status: DISCONTINUED | OUTPATIENT
Start: 2023-09-22 | End: 2023-09-23

## 2023-09-22 RX ORDER — CIPROFLOXACIN LACTATE 400MG/40ML
400 VIAL (ML) INTRAVENOUS EVERY 12 HOURS
Refills: 0 | Status: DISCONTINUED | OUTPATIENT
Start: 2023-09-23 | End: 2023-09-23

## 2023-09-22 RX ORDER — GABAPENTIN 400 MG/1
1 CAPSULE ORAL
Refills: 0 | DISCHARGE

## 2023-09-22 RX ORDER — SODIUM CHLORIDE 9 MG/ML
1000 INJECTION INTRAMUSCULAR; INTRAVENOUS; SUBCUTANEOUS ONCE
Refills: 0 | Status: COMPLETED | OUTPATIENT
Start: 2023-09-22 | End: 2023-09-22

## 2023-09-22 RX ORDER — ONDANSETRON 8 MG/1
4 TABLET, FILM COATED ORAL EVERY 8 HOURS
Refills: 0 | Status: DISCONTINUED | OUTPATIENT
Start: 2023-09-22 | End: 2023-09-25

## 2023-09-22 RX ORDER — METRONIDAZOLE 500 MG
500 TABLET ORAL ONCE
Refills: 0 | Status: COMPLETED | OUTPATIENT
Start: 2023-09-22 | End: 2023-09-22

## 2023-09-22 RX ORDER — ENOXAPARIN SODIUM 100 MG/ML
40 INJECTION SUBCUTANEOUS EVERY 24 HOURS
Refills: 0 | Status: DISCONTINUED | OUTPATIENT
Start: 2023-09-22 | End: 2023-09-25

## 2023-09-22 RX ORDER — DESVENLAFAXINE 50 MG/1
1.5 TABLET, EXTENDED RELEASE ORAL
Refills: 0 | DISCHARGE

## 2023-09-22 RX ORDER — GABAPENTIN 400 MG/1
100 CAPSULE ORAL
Refills: 0 | Status: DISCONTINUED | OUTPATIENT
Start: 2023-09-22 | End: 2023-09-25

## 2023-09-22 RX ORDER — NALTREXONE HYDROCHLORIDE 50 MG/1
50 TABLET, FILM COATED ORAL DAILY
Refills: 0 | Status: DISCONTINUED | OUTPATIENT
Start: 2023-09-22 | End: 2023-09-25

## 2023-09-22 RX ORDER — VENLAFAXINE HCL 75 MG
150 CAPSULE, EXT RELEASE 24 HR ORAL DAILY
Refills: 0 | Status: DISCONTINUED | OUTPATIENT
Start: 2023-09-22 | End: 2023-09-25

## 2023-09-22 RX ORDER — NICOTINE POLACRILEX 2 MG
1 GUM BUCCAL DAILY
Refills: 0 | Status: DISCONTINUED | OUTPATIENT
Start: 2023-09-22 | End: 2023-09-25

## 2023-09-22 RX ORDER — NALTREXONE HYDROCHLORIDE 50 MG/1
1 TABLET, FILM COATED ORAL
Refills: 0 | DISCHARGE

## 2023-09-22 RX ORDER — CIPROFLOXACIN LACTATE 400MG/40ML
400 VIAL (ML) INTRAVENOUS ONCE
Refills: 0 | Status: COMPLETED | OUTPATIENT
Start: 2023-09-22 | End: 2023-09-22

## 2023-09-22 RX ORDER — METRONIDAZOLE 500 MG
TABLET ORAL
Refills: 0 | Status: DISCONTINUED | OUTPATIENT
Start: 2023-09-22 | End: 2023-09-23

## 2023-09-22 RX ORDER — TRAZODONE HCL 50 MG
50 TABLET ORAL AT BEDTIME
Refills: 0 | Status: DISCONTINUED | OUTPATIENT
Start: 2023-09-22 | End: 2023-09-25

## 2023-09-22 RX ORDER — THIAMINE MONONITRATE (VIT B1) 100 MG
500 TABLET ORAL THREE TIMES A DAY
Refills: 0 | Status: DISCONTINUED | OUTPATIENT
Start: 2023-09-22 | End: 2023-09-25

## 2023-09-22 RX ADMIN — Medication 200 MILLIGRAM(S): at 22:59

## 2023-09-22 RX ADMIN — Medication 2 MILLIGRAM(S): at 22:59

## 2023-09-22 RX ADMIN — Medication 100 MILLIGRAM(S): at 21:55

## 2023-09-22 RX ADMIN — GABAPENTIN 100 MILLIGRAM(S): 400 CAPSULE ORAL at 18:57

## 2023-09-22 RX ADMIN — SODIUM CHLORIDE 1000 MILLILITER(S): 9 INJECTION INTRAMUSCULAR; INTRAVENOUS; SUBCUTANEOUS at 10:24

## 2023-09-22 RX ADMIN — Medication 1 MILLIGRAM(S): at 10:24

## 2023-09-22 RX ADMIN — ONDANSETRON 4 MILLIGRAM(S): 8 TABLET, FILM COATED ORAL at 10:24

## 2023-09-22 RX ADMIN — Medication 2 MILLIGRAM(S): at 18:57

## 2023-09-22 NOTE — ED PROVIDER NOTE - OBJECTIVE STATEMENT
33-year-old female with past medical history of EtOH abuse with prior admissions for withdrawal (no ICU stays), polysubstance abuse presents the emergency department for concern of alcohol withdrawal.  Patient reports that she typically drinks around 12 beers per day last drink around 11 PM.  She notes that she has had intermittent pain in the right flank associated with vomiting for the last 5 days as well.  She decided to come to the emergency department for evaluation of the pain and because she wants in regards to stopping alcohol consumption.  She denies chest pain, shortness of breath, dysuria, hematuria.

## 2023-09-22 NOTE — H&P ADULT - NSHPPHYSICALEXAM_GEN_ALL_CORE
Physical Exam:    General: well-nourished; NAD  Skin: warm and dry, no rashes  Head: NC/AT  Eyes: PERRLA; EOM intact; conjunctiva clear  ENMT: MMM  Neck: Supple   Respiratory: no chest wall deformity, CTAB w/good aeration  Cardiovascular: RRR, S1/S2 normal; No m/r/g  Abdominal: Mild distention. Tender in LLQ, LUQ, L flank; tenderness + mcburneys point tenderness, + tenderness at murphies point  Extremities: full ROM, no tenderness, no edema  Neurological: A&Ox4, CNII-VII grossly intact, strength 5/5 in UE and LE, no asterixis, no tremor

## 2023-09-22 NOTE — H&P ADULT - PROBLEM SELECTOR PLAN 2
P/w 2 weeks of abdominal pain with 1 week of progressively worsening nausea and vomiting. Pain worse on R side. Unable to tolerate PO diet at home. Notably 1 episode of hematochezia 1 month ago lasting one day  - F/u CTAP w/ con   - RUQ us w/ R echogenic lesion likely hemangioma, otherwise unremarkable   - C/w zofran 4mg IV qh8 PRN N/V; qtc 455 9/22

## 2023-09-22 NOTE — H&P ADULT - NSTOBACCOSCREENHP_GEN_A_NCS
BILATERAL DIGITAL SCREENING MAMMOGRAM TOMOSYNTHESIS WITH CAD: 11/29/2017

CLINICAL HISTORY: Routine screening.  





TECHNIQUE:  Breast tomosynthesis in addition to standard 2D mammography was performed. Current study 
was also evaluated with a Computer Aided Detection (CAD) system.  



COMPARISON: Comparison is made to exams dated:  6/7/2017 mammogram, 12/7/2016 mammogram, 11/28/2016 m
ammogram, 11/23/2015 mammogram, 11/20/2014 mammogram, and 11/12/2013 mammogram - [facility].   



BREAST COMPOSITION:  The tissue of both breasts is heterogeneously dense, which may obscure small mas
ses.  



FINDINGS:  No suspicious masses, calcifications, or areas of architectural distortion are noted in ei
ther breast. There has been no significant interval change compared to prior exams.  Bilateral benign
-appearing calcifications are not significantly changed.  Small circumscribed benign appearing mass w
ith associated calcifications in the right central breast is stable compared to multiple prior exams.


 



IMPRESSION:  ACR BI-RADS CATEGORY 2: BENIGN

There is no mammographic evidence of malignancy. A 1 year screening mammogram is recommended.  The pa
tient will receive written notification of the results.  





Approximately 10% of breast cancers are not detected with mammography. A negative mammographic report
 should not delay biopsy if a clinically suggestive mass is present.



Fransisca Mustafa M.D.          

/:11/29/2017 14:24:27  



Imaging Technologist: Sary RAWLS(WEST)(STAS), [facility]

letter sent: Normal 1/2  

BI-RADS Code: ACR BI-RADS Category 2: Benign Yes

## 2023-09-22 NOTE — H&P ADULT - NSHPREVIEWOFSYSTEMS_GEN_ALL_CORE
REVIEW OF SYSTEMS:    Constitutional: Decreased PO; no fevers  Head: no trauma  Eyes: no change in vision, no discharge  ENT: no ear pain, no nasal congestion, no sore throat  Neck: no pain or stiffness  Respiratory: no cough, wheezing, SOB  Cardiovascular: no CP, palpitations; no edema  Gastrointestinal: +  abd pain, N/V, diarrhea/constipation, bloody stools  Genitourinary: no dysuria, hematuria  Neurological: no numbness or weakness, no HA  Musculoskeletal: no myalgias or arthralgias

## 2023-09-22 NOTE — H&P ADULT - NSHPSOCIALHISTORY_GEN_ALL_CORE
Drinks 12 beers per day. Smokes 1 pack of cigarettes per day (15 pack year hx). Smokes marijuana 3-4 times per month. Lives at home with partner.

## 2023-09-22 NOTE — H&P ADULT - PROBLEM SELECTOR PLAN 3
Pt w/ hx of depression, on prestique at home  - C/w home prestique 150mg qd  - c/w trazadone 50mg qd Pt w/ hx of depression, on prestique at home  - C/w start venlafaxine 150mg qd (prestique not available)  - c/w trazadone 50mg qd No

## 2023-09-22 NOTE — ED ADULT NURSE NOTE - NSFALLUNIVINTERV_ED_ALL_ED
Bed/Stretcher in lowest position, wheels locked, appropriate side rails in place/Call bell, personal items and telephone in reach/Instruct patient to call for assistance before getting out of bed/chair/stretcher/Non-slip footwear applied when patient is off stretcher/[place] to call system/Physically safe environment - no spills, clutter or unnecessary equipment/Purposeful proactive rounding/Room/bathroom lighting operational, light cord in reach

## 2023-09-22 NOTE — H&P ADULT - NSICDXPASTMEDICALHX_GEN_ALL_CORE_FT
PAST MEDICAL HISTORY:  Alcohol abuse     Anxiety     Depression     MS (multiple sclerosis)      PAST MEDICAL HISTORY:  Alcohol abuse     Anxiety     Depression

## 2023-09-22 NOTE — H&P ADULT - HISTORY OF PRESENT ILLNESS
32 yo female pmh anxiety, depression, alcohol abuse pw vomiting and concern for alcohol withdrawal. Pt reports has been vomiting for past week. Increased in frequency and has been constant last 3 to 4 days w/ inability to tolerate PO. 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 Also reports R flank/abdominal pain 3-4 weeks. Has increased in severity through this time and is now 7/10. Denies hematuria, dysuria, increased urinary frequency. Pt states that drinks 12 beers per day. Last drink 11PM last night. Has been admitted 3-4 times for alcohol withdrawal. No previous ICU admissions or seizures. Does report hx hallucination. No current hallucinations or tremors. Has had some diarrhea over last week. Reports blood in stool 1 day approx 1 month prior. Blood present w/ wiping. Intermitted constipation w/ pain when pushing. Reports feel like has hemorrhoids. Denies fevers, chest pain, SOB.     ED course: Vitals /95 hr 126, RR 20, O2 99% on room air, temp 36.7, pt recived 1L NS bolus, 1mg ativan IV zofran 4mg 1x.

## 2023-09-22 NOTE — ED ADULT NURSE NOTE - OBJECTIVE STATEMENT
33y F BIB self from home p/w R sided abdominal discomfort, n/v and alcohol withdrawal. Pt is axo4, ambulates independently at baseline. Pmh of ETOH abuse w/ previous hospitalizations for withdrawal. Pt mentions that she typically drinks around 12 beers per day, last drink around 11 PM. Preferably drinks beer but admits to whiskey use interchangeably. Also states that she has had intermittent pain in the right flank associated with vomiting for the last 5 days. Patient undressed and placed into gown, call bell in hand and side rails up with bed in lowest position for safety. blanket provided. Comfort and safety provided. Placed on cardiac monitor, nsr.

## 2023-09-22 NOTE — H&P ADULT - ATTENDING COMMENTS
33 year old female with PMH anxiety, depression and alcohol abuse who presents with 2 weeks of nausea and vomiting as well as alcohol withdrawal. The patient states that over this time period, she has had 12 beers daily and occasionally more with her last drink around 11PM on 9/21. Will start Ativan taper, thiamine, folate, multivitamin and place social work consult. For her abdominal pain, CT abdomen/pelvis is pending, will follow up results. Potentially related to alcohol use. She has not tolerated much PO prior to coming to the ED, will start on clear liquid diet and advance as tolerated. Rest of plan as above.

## 2023-09-22 NOTE — H&P ADULT - ASSESSMENT
34 yo female pmh anxiety, depression, alcohol abuse pw vomiting, abd pain, and concern for alcohol withdrawal.

## 2023-09-22 NOTE — ED PROVIDER NOTE - ATTENDING APP SHARED VISIT CONTRIBUTION OF CARE
34 yo female hx of ETOH abuse presents for withdrawal.  last drink last PM.  tremulous, tachycardic here.  CIWA ordered, IV ativan.  CBC, CMP, admit for further management.

## 2023-09-22 NOTE — H&P ADULT - PROBLEM SELECTOR PLAN 1
Pt w/ hx of chronic alcohol abuse. Drinks 12 beers per day, last drink approx 18 hours prior  - CIWA symptom triggered ativan protocol  - Ativan taper  - Start thiamine 500 TID x5 days  - Start folate 1mg qd  - Start multivitamin   - SBIRT consult placed

## 2023-09-22 NOTE — PATIENT PROFILE ADULT - DOES PATIENT HAVE ADVANCE DIRECTIVE
August 17, 2021      Prince THAYER [place]  [id number] [id number]        To Whom It May Concern,     Please excuse Prince from work due to medical illness.       Sincerely,        Susan Haase, APRN CNP          
No

## 2023-09-22 NOTE — H&P ADULT - NSHPLABSRESULTS_GEN_ALL_CORE
.  LABS:                         12.9   10.30 )-----------( 298      ( 22 Sep 2023 10:24 )             38.9         136  |  101  |  7   ----------------------------<  99  4.0   |  20<L>  |  0.58    Ca    8.8      22 Sep 2023 10:24  Phos  2.6       Mg     2.0         TPro  7.2  /  Alb  4.4  /  TBili  0.7  /  DBili  0.2  /  AST  28  /  ALT  15  /  AlkPhos  87        Urinalysis Basic - ( 22 Sep 2023 13:13 )    Color: Yellow / Appearance: Clear / S.022 / pH: x  Gluc: x / Ketone: Trace  / Bili: Negative / Urobili: Negative   Blood: x / Protein: Trace / Nitrite: Negative   Leuk Esterase: Negative / RBC: 12 /hpf / WBC 2 /HPF   Sq Epi: x / Non Sq Epi: x / Bacteria: Few            RADIOLOGY, EKG & ADDITIONAL TESTS: Reviewed.

## 2023-09-22 NOTE — ED PROVIDER NOTE - PHYSICAL EXAMINATION
CONSTITUTIONAL: Patient is awake, alert and oriented x 3. Patient is anxious appearing and in no acute distress  HEAD: NCAT  NECK: supple, FROM  LUNGS: CTA b/l, no wheezing or rales   HEART: Tachycardic, regular rhythm.+S1S2 no murmurs  ABDOMEN: Soft, non-distended, + ttp RUQ, R flank and R CVA, no rebound or guarding  EXTREMITY: no edema or calf tenderness b/l, FROM upper and lower ext b/l  SKIN: with no rash or lesions  NEURO: No focal deficits

## 2023-09-22 NOTE — ED PROVIDER NOTE - NS ED ATTENDING STATEMENT MOD
This was a shared visit with the [facility]. I reviewed and verified the documentation and independently performed the documented:

## 2023-09-23 ENCOUNTER — TRANSCRIPTION ENCOUNTER (OUTPATIENT)
Age: 33
End: 2023-09-23

## 2023-09-23 LAB
ALBUMIN SERPL ELPH-MCNC: 3.8 G/DL — SIGNIFICANT CHANGE UP (ref 3.3–5)
ALP SERPL-CCNC: 80 U/L — SIGNIFICANT CHANGE UP (ref 40–120)
ALT FLD-CCNC: 10 U/L — SIGNIFICANT CHANGE UP (ref 10–45)
AMMONIA BLD-MCNC: 41 UMOL/L — SIGNIFICANT CHANGE UP (ref 11–55)
ANION GAP SERPL CALC-SCNC: 12 MMOL/L — SIGNIFICANT CHANGE UP (ref 5–17)
AST SERPL-CCNC: 29 U/L — SIGNIFICANT CHANGE UP (ref 10–40)
BASOPHILS # BLD AUTO: 0.04 K/UL — SIGNIFICANT CHANGE UP (ref 0–0.2)
BASOPHILS NFR BLD AUTO: 0.7 % — SIGNIFICANT CHANGE UP (ref 0–2)
BILIRUB DIRECT SERPL-MCNC: 0.2 MG/DL — SIGNIFICANT CHANGE UP (ref 0–0.3)
BILIRUB INDIRECT FLD-MCNC: 0.4 MG/DL — SIGNIFICANT CHANGE UP (ref 0.2–1)
BILIRUB SERPL-MCNC: 0.6 MG/DL — SIGNIFICANT CHANGE UP (ref 0.2–1.2)
BUN SERPL-MCNC: 5 MG/DL — LOW (ref 7–23)
CALCIUM SERPL-MCNC: 8.4 MG/DL — SIGNIFICANT CHANGE UP (ref 8.4–10.5)
CHLORIDE SERPL-SCNC: 103 MMOL/L — SIGNIFICANT CHANGE UP (ref 96–108)
CO2 SERPL-SCNC: 22 MMOL/L — SIGNIFICANT CHANGE UP (ref 22–31)
CREAT SERPL-MCNC: 0.6 MG/DL — SIGNIFICANT CHANGE UP (ref 0.5–1.3)
CULTURE RESULTS: SIGNIFICANT CHANGE UP
EGFR: 121 ML/MIN/1.73M2 — SIGNIFICANT CHANGE UP
EOSINOPHIL # BLD AUTO: 0.47 K/UL — SIGNIFICANT CHANGE UP (ref 0–0.5)
EOSINOPHIL NFR BLD AUTO: 8.5 % — HIGH (ref 0–6)
GLUCOSE SERPL-MCNC: 86 MG/DL — SIGNIFICANT CHANGE UP (ref 70–99)
HCT VFR BLD CALC: 34 % — LOW (ref 34.5–45)
HGB BLD-MCNC: 11.3 G/DL — LOW (ref 11.5–15.5)
IMM GRANULOCYTES NFR BLD AUTO: 0.4 % — SIGNIFICANT CHANGE UP (ref 0–0.9)
LYMPHOCYTES # BLD AUTO: 1.5 K/UL — SIGNIFICANT CHANGE UP (ref 1–3.3)
LYMPHOCYTES # BLD AUTO: 27 % — SIGNIFICANT CHANGE UP (ref 13–44)
MAGNESIUM SERPL-MCNC: 1.9 MG/DL — SIGNIFICANT CHANGE UP (ref 1.6–2.6)
MCHC RBC-ENTMCNC: 30.8 PG — SIGNIFICANT CHANGE UP (ref 27–34)
MCHC RBC-ENTMCNC: 33.2 GM/DL — SIGNIFICANT CHANGE UP (ref 32–36)
MCV RBC AUTO: 92.6 FL — SIGNIFICANT CHANGE UP (ref 80–100)
MONOCYTES # BLD AUTO: 0.58 K/UL — SIGNIFICANT CHANGE UP (ref 0–0.9)
MONOCYTES NFR BLD AUTO: 10.4 % — SIGNIFICANT CHANGE UP (ref 2–14)
NEUTROPHILS # BLD AUTO: 2.95 K/UL — SIGNIFICANT CHANGE UP (ref 1.8–7.4)
NEUTROPHILS NFR BLD AUTO: 53 % — SIGNIFICANT CHANGE UP (ref 43–77)
NRBC # BLD: 0 /100 WBCS — SIGNIFICANT CHANGE UP (ref 0–0)
PHOSPHATE SERPL-MCNC: 3 MG/DL — SIGNIFICANT CHANGE UP (ref 2.5–4.5)
PLATELET # BLD AUTO: 252 K/UL — SIGNIFICANT CHANGE UP (ref 150–400)
POTASSIUM SERPL-MCNC: 3.6 MMOL/L — SIGNIFICANT CHANGE UP (ref 3.5–5.3)
POTASSIUM SERPL-SCNC: 3.6 MMOL/L — SIGNIFICANT CHANGE UP (ref 3.5–5.3)
PROT SERPL-MCNC: 6.2 G/DL — SIGNIFICANT CHANGE UP (ref 6–8.3)
RBC # BLD: 3.67 M/UL — LOW (ref 3.8–5.2)
RBC # FLD: 12.2 % — SIGNIFICANT CHANGE UP (ref 10.3–14.5)
SODIUM SERPL-SCNC: 137 MMOL/L — SIGNIFICANT CHANGE UP (ref 135–145)
SPECIMEN SOURCE: SIGNIFICANT CHANGE UP
WBC # BLD: 5.56 K/UL — SIGNIFICANT CHANGE UP (ref 3.8–10.5)
WBC # FLD AUTO: 5.56 K/UL — SIGNIFICANT CHANGE UP (ref 3.8–10.5)

## 2023-09-23 PROCEDURE — 99232 SBSQ HOSP IP/OBS MODERATE 35: CPT | Mod: GC

## 2023-09-23 RX ORDER — SODIUM CHLORIDE 9 MG/ML
500 INJECTION, SOLUTION INTRAVENOUS
Refills: 0 | Status: DISCONTINUED | OUTPATIENT
Start: 2023-09-23 | End: 2023-09-25

## 2023-09-23 RX ORDER — NICOTINE POLACRILEX 2 MG
2 GUM BUCCAL
Refills: 0 | Status: DISCONTINUED | OUTPATIENT
Start: 2023-09-23 | End: 2023-09-25

## 2023-09-23 RX ADMIN — Medication 200 MILLIGRAM(S): at 07:33

## 2023-09-23 RX ADMIN — Medication 1 TABLET(S): at 11:55

## 2023-09-23 RX ADMIN — Medication 50 MILLIGRAM(S): at 21:13

## 2023-09-23 RX ADMIN — Medication 2 MILLIGRAM(S): at 14:08

## 2023-09-23 RX ADMIN — Medication 100 MILLIGRAM(S): at 13:34

## 2023-09-23 RX ADMIN — NALTREXONE HYDROCHLORIDE 50 MILLIGRAM(S): 50 TABLET, FILM COATED ORAL at 11:55

## 2023-09-23 RX ADMIN — Medication 1 MILLIGRAM(S): at 11:55

## 2023-09-23 RX ADMIN — Medication 1 PATCH: at 11:54

## 2023-09-23 RX ADMIN — Medication 100 MILLIGRAM(S): at 06:04

## 2023-09-23 RX ADMIN — Medication 2 MILLIGRAM(S): at 09:58

## 2023-09-23 RX ADMIN — Medication 2 MILLIGRAM(S): at 02:59

## 2023-09-23 RX ADMIN — Medication 150 MILLIGRAM(S): at 11:55

## 2023-09-23 RX ADMIN — Medication 1.5 MILLIGRAM(S): at 21:13

## 2023-09-23 RX ADMIN — Medication 105 MILLIGRAM(S): at 08:38

## 2023-09-23 RX ADMIN — Medication 1 PATCH: at 19:10

## 2023-09-23 RX ADMIN — GABAPENTIN 100 MILLIGRAM(S): 400 CAPSULE ORAL at 06:04

## 2023-09-23 RX ADMIN — Medication 105 MILLIGRAM(S): at 00:31

## 2023-09-23 RX ADMIN — GABAPENTIN 100 MILLIGRAM(S): 400 CAPSULE ORAL at 17:23

## 2023-09-23 RX ADMIN — SODIUM CHLORIDE 50 MILLILITER(S): 9 INJECTION, SOLUTION INTRAVENOUS at 10:37

## 2023-09-23 RX ADMIN — Medication 2 MILLIGRAM(S): at 06:23

## 2023-09-23 RX ADMIN — Medication 1.5 MILLIGRAM(S): at 17:23

## 2023-09-23 RX ADMIN — ENOXAPARIN SODIUM 40 MILLIGRAM(S): 100 INJECTION SUBCUTANEOUS at 11:54

## 2023-09-23 RX ADMIN — Medication 105 MILLIGRAM(S): at 13:34

## 2023-09-23 RX ADMIN — Medication 105 MILLIGRAM(S): at 21:16

## 2023-09-23 RX ADMIN — Medication 2 MILLIGRAM(S): at 20:09

## 2023-09-23 NOTE — PROGRESS NOTE ADULT - ATTENDING COMMENTS
ATTENDING STATEMENT:  Hospital length of stay: 2d    Assessment & Plan:   GAMALIEL BOWMAN is a 33y Female pmh anxiety, depression, alcohol abuse pw vomiting, abd pain, and concern for alcohol withdrawal.    Alcohol withdrawal syndrome    - CIWA protocol. Switch from IV to oral ativan taper    Dr. Doni Tracy  Hospitalist

## 2023-09-23 NOTE — PROGRESS NOTE ADULT - ASSESSMENT
32 yo female pmh anxiety, depression, alcohol abuse pw vomiting, abd pain, and concern for alcohol withdrawal. 34 yo female pmh anxiety, depression, alcohol abuse pw vomiting, abd pain, and concern for alcohol withdrawal.

## 2023-09-23 NOTE — DISCHARGE NOTE PROVIDER - NSDCFUADDAPPT_GEN_ALL_CORE_FT
APPTS ARE READY TO BE MADE: [ ] YES    Best Family or Patient Contact (if needed):    Additional Information about above appointments (if needed): [phone]    1: PCP Eddie Armstrong  2: F/u w/ pts Psychiatist    Other comments or requests:    APPTS ARE READY TO BE MADE: [X] YES    Best Family or Patient Contact (if needed):    Additional Information about above appointments (if needed): [phone]    1: PCP Eddie Armstrong  2: F/u w/ pts Psychiatist    Other comments or requests:    APPTS ARE READY TO BE MADE: [X] YES    Best Family or Patient Contact (if needed):    Additional Information about above appointments (if needed): [phone]    1: PCP Eddie Armstrong  2: F/u w/ pts Psychiatist    Other comments or requests:   Patient was previously scheduled for an appointment on 09/27 at [address] [place] [address] with Dr. Morgan.

## 2023-09-23 NOTE — DISCHARGE NOTE PROVIDER - HOSPITAL COURSE
Discharge Summary     Discharge diagnoses:   Alcohol withdrawal    Hospital Course:   For full details, please see H&P, progress notes, consult notes and ancillary notes. Briefly, HPI:  34 yo female pmh anxiety, depression, alcohol abuse pw vomiting and concern for alcohol withdrawal. Pt reports has been vomiting for past week. Increased in frequency and has been constant last 3 to 4 days w/ inability to tolerate PO. 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 Also reports R flank/abdominal pain 3-4 weeks. Has increased in severity through this time and is now 7/10. Denies hematuria, dysuria, increased urinary frequency. Pt states that drinks 12 beers per day. Last drink 11PM last night. Has been admitted 3-4 times for alcohol withdrawal. No previous ICU admissions or seizures. Does report hx hallucination. No current hallucinations or tremors. Has had some diarrhea over last week. Reports blood in stool 1 day approx 1 month prior. Blood present w/ wiping. Intermitted constipation w/ pain when pushing. Reports feel like has hemorrhoids. Denies fevers, chest pain, SOB.     ED course: Vitals /95 hr 126, RR 20, O2 99% on room air, temp 36.7, pt recived 1L NS bolus, 1mg ativan IV zofran 4mg 1x. (22 Sep 2023 16:49)  .     The patient's hospital course will be summarized in a problem based format.    # Alcohol abuse w/ withdrawal  Pt presented w/ concern for alcohol withdrawal. Has hx of chronic alcohol abuse. Drinks 12 beers per day, last drink approx 18 hours prior to admission. Pt was started on symptom triggered CIWA ativan protocol and ativan taper. Intially w/ IV ativan but transsition to PO after approx 24 hours. Pt was also started on thiamine, folate, and multivitamen. Pts CIWAs remained in the 2-4 range durng admission. The importance of refraining from excessive alcohol use was epxressed to pt.     # Abdominal Pain  Pt p/w 2 weeks of abdominal pain with 1 week of progressively worsening nausea and vomiting assocaited w/ decreased PO intake. Pain worse on R side. Notably 1 episode of hematochezia 1 month ago lasting one day approx 1 month prior to admission. A RUQ US was performed and showed a R echogenic lesion likely consistant w/ hemangioma otherwise unremarkable. A CTAP w/ con was performed and was w/o acute intra-abdominal pathology. Pt was given zofran 4mg IV q8h PRN for N/V. Pts abd pain and N/V improved while inpt.    On day of discharge, patient is clinically stable with no new exam findings or acute symptoms compared to prior. The patient was seen by the attending physician on the date of discharge and deemed stable and acceptable for discharge.     Discharge follow up action items:   1. F/u w/ PCP in next 1-2 weeks  2. F/u w/ psych in next 1-2 weeks  3. medication changes: none    Discharge Summary     Discharge diagnoses:   Alcohol withdrawal    Hospital Course:   For full details, please see H&P, progress notes, consult notes and ancillary notes. Briefly, HPI:  34 yo female pmh anxiety, depression, alcohol abuse pw vomiting and concern for alcohol withdrawal. Pt reports has been vomiting for past week. Increased in frequency and has been constant last 3 to 4 days w/ inability to tolerate PO. 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 Also reports R flank/abdominal pain 3-4 weeks. Has increased in severity through this time and is now 7/10. Denies hematuria, dysuria, increased urinary frequency. Pt states that drinks 12 beers per day. Last drink 11PM last night. Has been admitted 3-4 times for alcohol withdrawal. No previous ICU admissions or seizures. Does report hx hallucination. No current hallucinations or tremors. Has had some diarrhea over last week. Reports blood in stool 1 day approx 1 month prior. Blood present w/ wiping. Intermitted constipation w/ pain when pushing. Reports feel like has hemorrhoids. Denies fevers, chest pain, SOB.     ED course: Vitals /95 hr 126, RR 20, O2 99% on room air, temp 36.7, pt recived 1L NS bolus, 1mg ativan IV zofran 4mg 1x. (22 Sep 2023 16:49)  .     The patient's hospital course will be summarized in a problem based format.    # Alcohol abuse w/ withdrawal  Pt presented w/ concern for alcohol withdrawal. Has hx of chronic alcohol abuse. Drinks 12 beers per day, last drink approx 18 hours prior to admission. Pt was started on symptom triggered CIWA ativan protocol and ativan taper. Intially w/ IV ativan but transsition to PO after approx 24 hours. Pt was also started on thiamine, folate, and multivitamen. Pts CIWAs remained in the 2-4 range during admission. The importance of refraining from excessive alcohol use was expressed to pt. SW provided mental health services to the patient. Patient has follow up with counselor within 1 week of discharge.     # Abdominal Pain  Pt p/w 2 weeks of abdominal pain with 1 week of progressively worsening nausea and vomiting assocaited w/ decreased PO intake. Pain worse on R side. Notably 1 episode of hematochezia 1 month ago lasting one day approx 1 month prior to admission. A RUQ US was performed and showed a R echogenic lesion likely consistant w/ hemangioma otherwise unremarkable. A CTAP w/ con was performed and was w/o acute intra-abdominal pathology. Pt was given zofran 4mg IV q8h PRN for N/V. Pts abd pain and N/V improved while inpt.    On day of discharge, patient is clinically stable with no new exam findings or acute symptoms compared to prior. The patient was seen by the attending physician on the date of discharge and deemed stable and acceptable for discharge.     Discharge follow up action items:   1. F/u w/ PCP in next 1-2 weeks  2. F/u w/ psych in next 1-2 weeks  3. medication changes: none

## 2023-09-23 NOTE — DISCHARGE NOTE PROVIDER - NSDCCPCAREPLAN_GEN_ALL_CORE_FT
PRINCIPAL DISCHARGE DIAGNOSIS  Diagnosis: Alcohol abuse with withdrawal, unspecified  Assessment and Plan of Treatment: You came to the hospital with abdominal pain, vomiting, and concern for alcohol withdrawal. You were closely monitored and started on a taper of the medication Ativan and improved.  You were counciled on the importance of avoiding excessive alcohol use.  Please follow up with your primary care provider and psychiatrist within the next 1-2 weeks.   Please return to the emergency department if you experince severe abdmoinal pain, intractable vomiting, seizures, hallucinations, chest pain, shortness of breath.     PRINCIPAL DISCHARGE DIAGNOSIS  Diagnosis: Alcohol abuse with withdrawal, unspecified  Assessment and Plan of Treatment: You came to the hospital with abdominal pain, vomiting, and concern for alcohol withdrawal. You were closely monitored and started on a taper of the medication Ativan and improved.  You were counciled on the importance of avoiding excessive alcohol use.  Please follow up with your primary care provider and psychiatrist within the next 1-2 weeks. Please follow up with your counselor within 1 week. You were provided mental health resources by [facility].   Please return to the emergency department if you experince severe abdmoinal pain, intractable vomiting, seizures, hallucinations, chest pain, shortness of breath.

## 2023-09-23 NOTE — DISCHARGE NOTE PROVIDER - NSDCMRMEDTOKEN_GEN_ALL_CORE_FT
gabapentin 100 mg oral capsule: 1 cap(s) orally 2 times a day  naltrexone 50 mg oral tablet: 1 tab(s) orally once a day  Pristiq 100 mg oral tablet, extended release: 1.5 tab(s) orally once a day  traZODone 50 mg oral tablet: 1 tab(s) orally once a day (at bedtime)

## 2023-09-23 NOTE — PROGRESS NOTE ADULT - PROBLEM SELECTOR PLAN 1
Pt w/ hx of chronic alcohol abuse. Drinks 12 beers per day, last drink approx 18 hours prior  - CIWA symptom triggered ativan protocol  - Ativan taper  - Start thiamine 500 TID x5 days  - Start folate 1mg qd  - Start multivitamin   - SBIRT consult placed Pt w/ hx of chronic alcohol abuse. Drinks 12 beers per day, last drink approx 18 hours prior  - CIWA symptom triggered ativan PO protocol  - Ativan PO taper  - CIWAs 2-4 overnight  - cw thiamine 500 TID x5 days  - cw folate 1mg qd  - cw multivitamin   - SBIRT consult placed

## 2023-09-24 LAB
ALBUMIN SERPL ELPH-MCNC: 4 G/DL — SIGNIFICANT CHANGE UP (ref 3.3–5)
ALP SERPL-CCNC: 77 U/L — SIGNIFICANT CHANGE UP (ref 40–120)
ALT FLD-CCNC: 12 U/L — SIGNIFICANT CHANGE UP (ref 10–45)
ANION GAP SERPL CALC-SCNC: 12 MMOL/L — SIGNIFICANT CHANGE UP (ref 5–17)
AST SERPL-CCNC: 22 U/L — SIGNIFICANT CHANGE UP (ref 10–40)
BASOPHILS # BLD AUTO: 0.02 K/UL — SIGNIFICANT CHANGE UP (ref 0–0.2)
BASOPHILS NFR BLD AUTO: 0.2 % — SIGNIFICANT CHANGE UP (ref 0–2)
BILIRUB SERPL-MCNC: 0.4 MG/DL — SIGNIFICANT CHANGE UP (ref 0.2–1.2)
BUN SERPL-MCNC: 5 MG/DL — LOW (ref 7–23)
CALCIUM SERPL-MCNC: 8.9 MG/DL — SIGNIFICANT CHANGE UP (ref 8.4–10.5)
CHLORIDE SERPL-SCNC: 103 MMOL/L — SIGNIFICANT CHANGE UP (ref 96–108)
CO2 SERPL-SCNC: 23 MMOL/L — SIGNIFICANT CHANGE UP (ref 22–31)
CREAT SERPL-MCNC: 0.64 MG/DL — SIGNIFICANT CHANGE UP (ref 0.5–1.3)
EGFR: 119 ML/MIN/1.73M2 — SIGNIFICANT CHANGE UP
EOSINOPHIL # BLD AUTO: 0.59 K/UL — HIGH (ref 0–0.5)
EOSINOPHIL NFR BLD AUTO: 7.3 % — HIGH (ref 0–6)
GLUCOSE SERPL-MCNC: 80 MG/DL — SIGNIFICANT CHANGE UP (ref 70–99)
HCT VFR BLD CALC: 36.5 % — SIGNIFICANT CHANGE UP (ref 34.5–45)
HGB BLD-MCNC: 11.9 G/DL — SIGNIFICANT CHANGE UP (ref 11.5–15.5)
IMM GRANULOCYTES NFR BLD AUTO: 0.2 % — SIGNIFICANT CHANGE UP (ref 0–0.9)
LYMPHOCYTES # BLD AUTO: 2.09 K/UL — SIGNIFICANT CHANGE UP (ref 1–3.3)
LYMPHOCYTES # BLD AUTO: 26 % — SIGNIFICANT CHANGE UP (ref 13–44)
MAGNESIUM SERPL-MCNC: 1.9 MG/DL — SIGNIFICANT CHANGE UP (ref 1.6–2.6)
MCHC RBC-ENTMCNC: 30.5 PG — SIGNIFICANT CHANGE UP (ref 27–34)
MCHC RBC-ENTMCNC: 32.6 GM/DL — SIGNIFICANT CHANGE UP (ref 32–36)
MCV RBC AUTO: 93.6 FL — SIGNIFICANT CHANGE UP (ref 80–100)
MONOCYTES # BLD AUTO: 0.5 K/UL — SIGNIFICANT CHANGE UP (ref 0–0.9)
MONOCYTES NFR BLD AUTO: 6.2 % — SIGNIFICANT CHANGE UP (ref 2–14)
NEUTROPHILS # BLD AUTO: 4.83 K/UL — SIGNIFICANT CHANGE UP (ref 1.8–7.4)
NEUTROPHILS NFR BLD AUTO: 60.1 % — SIGNIFICANT CHANGE UP (ref 43–77)
NRBC # BLD: 0 /100 WBCS — SIGNIFICANT CHANGE UP (ref 0–0)
PHOSPHATE SERPL-MCNC: 3.7 MG/DL — SIGNIFICANT CHANGE UP (ref 2.5–4.5)
PLATELET # BLD AUTO: 272 K/UL — SIGNIFICANT CHANGE UP (ref 150–400)
POTASSIUM SERPL-MCNC: 3.8 MMOL/L — SIGNIFICANT CHANGE UP (ref 3.5–5.3)
POTASSIUM SERPL-SCNC: 3.8 MMOL/L — SIGNIFICANT CHANGE UP (ref 3.5–5.3)
PROT SERPL-MCNC: 6.6 G/DL — SIGNIFICANT CHANGE UP (ref 6–8.3)
RBC # BLD: 3.9 M/UL — SIGNIFICANT CHANGE UP (ref 3.8–5.2)
RBC # FLD: 12.2 % — SIGNIFICANT CHANGE UP (ref 10.3–14.5)
SODIUM SERPL-SCNC: 138 MMOL/L — SIGNIFICANT CHANGE UP (ref 135–145)
WBC # BLD: 8.05 K/UL — SIGNIFICANT CHANGE UP (ref 3.8–10.5)
WBC # FLD AUTO: 8.05 K/UL — SIGNIFICANT CHANGE UP (ref 3.8–10.5)

## 2023-09-24 PROCEDURE — 99232 SBSQ HOSP IP/OBS MODERATE 35: CPT | Mod: GC

## 2023-09-24 RX ADMIN — Medication 2 MILLIGRAM(S): at 17:36

## 2023-09-24 RX ADMIN — Medication 1 MILLIGRAM(S): at 11:50

## 2023-09-24 RX ADMIN — Medication 50 MILLIGRAM(S): at 21:52

## 2023-09-24 RX ADMIN — Medication 1 MILLIGRAM(S): at 17:35

## 2023-09-24 RX ADMIN — Medication 1 PATCH: at 19:56

## 2023-09-24 RX ADMIN — Medication 2 MILLIGRAM(S): at 11:09

## 2023-09-24 RX ADMIN — Medication 1 PATCH: at 11:49

## 2023-09-24 RX ADMIN — Medication 1.5 MILLIGRAM(S): at 06:09

## 2023-09-24 RX ADMIN — Medication 105 MILLIGRAM(S): at 21:52

## 2023-09-24 RX ADMIN — Medication 150 MILLIGRAM(S): at 11:50

## 2023-09-24 RX ADMIN — Medication 105 MILLIGRAM(S): at 14:45

## 2023-09-24 RX ADMIN — GABAPENTIN 100 MILLIGRAM(S): 400 CAPSULE ORAL at 06:09

## 2023-09-24 RX ADMIN — Medication 1 MILLIGRAM(S): at 21:52

## 2023-09-24 RX ADMIN — Medication 1 TABLET(S): at 11:49

## 2023-09-24 RX ADMIN — Medication 1.5 MILLIGRAM(S): at 02:09

## 2023-09-24 RX ADMIN — ENOXAPARIN SODIUM 40 MILLIGRAM(S): 100 INJECTION SUBCUTANEOUS at 11:50

## 2023-09-24 RX ADMIN — Medication 1.5 MILLIGRAM(S): at 14:45

## 2023-09-24 RX ADMIN — Medication 1 PATCH: at 12:14

## 2023-09-24 RX ADMIN — Medication 105 MILLIGRAM(S): at 06:09

## 2023-09-24 RX ADMIN — Medication 1.5 MILLIGRAM(S): at 09:22

## 2023-09-24 RX ADMIN — GABAPENTIN 100 MILLIGRAM(S): 400 CAPSULE ORAL at 17:36

## 2023-09-24 RX ADMIN — NALTREXONE HYDROCHLORIDE 50 MILLIGRAM(S): 50 TABLET, FILM COATED ORAL at 11:10

## 2023-09-24 RX ADMIN — Medication 1 PATCH: at 07:33

## 2023-09-24 NOTE — PROGRESS NOTE ADULT - ASSESSMENT
32 yo female pmh anxiety, depression, alcohol abuse pw vomiting, abd pain, and concern for alcohol withdrawal.

## 2023-09-24 NOTE — PROGRESS NOTE ADULT - TIME BILLING
- Ordering, reviewing, and interpreting labs, testing, and imaging.  - Independently obtaining a review of systems and performing a physical exam  - Reviewing consultant documentation/recommendations in addition to discussing plan of care with consultants.  - Counselling and educating patient and family regarding interpretation of aforementioned items and plan of care.
- Ordering, reviewing, and interpreting labs, testing, and imaging.  - Independently obtaining a review of systems and performing a physical exam  - Reviewing consultant documentation/recommendations in addition to discussing plan of care with consultants.  - Counselling and educating patient and family regarding interpretation of aforementioned items and plan of care.

## 2023-09-24 NOTE — PROGRESS NOTE ADULT - ATTENDING COMMENTS
ATTENDING STATEMENT:  Hospital length of stay: 2d    Assessment & Plan:   GAMALIEL BOWMAN is a 33y Female pmh anxiety, depression, alcohol abuse pw vomiting, abd pain, and concern for alcohol withdrawal.    Alcohol withdrawal syndrome  Urine THC +    - CIWA protocol. Oral ativan taper  - d/c tomorrow if stable on pending PO ativan doses  - SBIRT eval for EtOH abuse and THC    Dr. Doni Tracy  Hospitalist ATTENDING STATEMENT:  Hospital length of stay: 3d    Assessment & Plan:   GAMALIEL BOWMAN is a 33y Female pmh anxiety, depression, alcohol abuse pw vomiting, abd pain, and concern for alcohol withdrawal.    Alcohol withdrawal syndrome  Urine THC +    - CIWA protocol. Oral ativan taper  - d/c tomorrow if stable on pending PO ativan doses  - SBIRT eval for EtOH abuse and THC    Dr. Doni Tracy  Hospitalist

## 2023-09-24 NOTE — SBIRT NOTE ADULT - NSSBIRTBRIEFINTDET_GEN_A_CORE
Patient preference is to follow up with the outpatient etoh counselor at [facility], [phone].

## 2023-09-24 NOTE — PROGRESS NOTE ADULT - SUBJECTIVE AND OBJECTIVE BOX
PROGRESS NOTE:     Patient is a 33y old  Female who presents with a chief complaint of Vomiting alcohol withdrawal (23 Sep 2023 18:43)      SUBJECTIVE / OVERNIGHT EVENTS:    ADDITIONAL REVIEW OF SYSTEMS:    MEDICATIONS  (STANDING):  enoxaparin Injectable 40 milliGRAM(s) SubCutaneous every 24 hours  folic acid 1 milliGRAM(s) Oral daily  gabapentin 100 milliGRAM(s) Oral two times a day  lactated ringers. 500 milliLiter(s) (50 mL/Hr) IV Continuous <Continuous>  LORazepam     Tablet   Oral   LORazepam     Tablet 1.5 milliGRAM(s) Oral every 4 hours  multivitamin 1 Tablet(s) Oral daily  naltrexone 50 milliGRAM(s) Oral daily  nicotine -   7 mG/24Hr(s) Patch 1 Patch Transdermal daily  thiamine IVPB 500 milliGRAM(s) IV Intermittent three times a day  traZODone 50 milliGRAM(s) Oral at bedtime  venlafaxine XR. 150 milliGRAM(s) Oral daily    MEDICATIONS  (PRN):  acetaminophen     Tablet .. 650 milliGRAM(s) Oral every 6 hours PRN Temp greater or equal to 38C (100.4F), Mild Pain (1 - 3)  aluminum hydroxide/magnesium hydroxide/simethicone Suspension 30 milliLiter(s) Oral every 4 hours PRN Dyspepsia  LORazepam     Tablet 2 milliGRAM(s) Oral every 2 hours PRN CIWA-Ar score increase by 2 points and a total score of 7 or less  LORazepam     Tablet 2 milliGRAM(s) Oral every 1 hour PRN CIWA-Ar score 8 or greater  LORazepam     Tablet 2 milliGRAM(s) Oral every 1 hour PRN Symptom-triggered: each CIWA -Ar score 8 or GREATER  melatonin 3 milliGRAM(s) Oral at bedtime PRN Insomnia  nicotine  Polacrilex Lozenge 2 milliGRAM(s) Oral every 2 hours PRN Breakthrough cravings  ondansetron Injectable 4 milliGRAM(s) IV Push every 8 hours PRN Nausea and/or Vomiting      CAPILLARY BLOOD GLUCOSE        I&O's Summary    23 Sep 2023 07:01  -  24 Sep 2023 07:00  --------------------------------------------------------  IN: 650 mL / OUT: 2 mL / NET: 648 mL        PHYSICAL EXAM:  Vital Signs Last 24 Hrs  T(C): 36.8 (24 Sep 2023 00:05), Max: 37.2 (23 Sep 2023 09:43)  T(F): 98.3 (24 Sep 2023 00:05), Max: 98.9 (23 Sep 2023 09:43)  HR: 78 (24 Sep 2023 00:05) (71 - 85)  BP: 105/68 (24 Sep 2023 00:05) (102/70 - 125/92)  BP(mean): --  RR: 18 (24 Sep 2023 00:05) (18 - 18)  SpO2: 98% (24 Sep 2023 00:05) (98% - 100%)    Parameters below as of 24 Sep 2023 00:05  Patient On (Oxygen Delivery Method): room air        CONSTITUTIONAL: NAD, well-developed  RESPIRATORY: Normal respiratory effort; lungs are clear to auscultation bilaterally  CARDIOVASCULAR: Regular rate and rhythm, normal S1 and S2, no murmur/rub/gallop; No lower extremity edema; Peripheral pulses are 2+ bilaterally  ABDOMEN: Nontender to palpation, normoactive bowel sounds, no rebound/guarding; No hepatosplenomegaly  MUSCULOSKELETAL: no clubbing or cyanosis of digits; no joint swelling or tenderness to palpation  PSYCH: A+O to person, place, and time; affect appropriate    LABS:                        11.3   5.56  )-----------( 252      ( 23 Sep 2023 07:24 )             34.0     09-23    137  |  103  |  5<L>  ----------------------------<  86  3.6   |  22  |  0.60    Ca    8.4      23 Sep 2023 07:11  Phos  3.0     09-23  Mg     1.9     09-23    TPro  6.2  /  Alb  3.8  /  TBili  0.6  /  DBili  0.2  /  AST  29  /  ALT  10  /  AlkPhos  80  09-23          Urinalysis Basic - ( 23 Sep 2023 07:11 )    Color: x / Appearance: x / SG: x / pH: x  Gluc: 86 mg/dL / Ketone: x  / Bili: x / Urobili: x   Blood: x / Protein: x / Nitrite: x   Leuk Esterase: x / RBC: x / WBC x   Sq Epi: x / Non Sq Epi: x / Bacteria: x        Culture - Urine (collected 22 Sep 2023 13:13)  Source: Clean Catch Clean Catch (Midstream)  Final Report (23 Sep 2023 14:17):    <10,000 CFU/mL Normal Urogenital Rebekah        RADIOLOGY & ADDITIONAL TESTS:  Results Reviewed:   Imaging Personally Reviewed:  Electrocardiogram Personally Reviewed:    COORDINATION OF CARE:  Care Discussed with Consultants/Other Providers [Y/N]:  Prior or Outpatient Records Reviewed [Y/N]:      ******************************  Authored By: Dayton Romano MD PGY2  Internal Medicine  Pager: [phone] [facility]; [id number] [facility]  MS Teams Preferred  ******************************   PROGRESS NOTE:     Patient is a 33y old  Female who presents with a chief complaint of Vomiting alcohol withdrawal (23 Sep 2023 18:43)      SUBJECTIVE / OVERNIGHT EVENTS: No acute issues overnight per night team. This morning pt tearful about her overall situation, but denies any hallucination, tremors, anxiety/agitation. notes continued but much milder R sided abdominal pain. Tolerating PO diet well, BMs wnl. CIWA 0 on my exam.    ADDITIONAL REVIEW OF SYSTEMS:    MEDICATIONS  (STANDING):  enoxaparin Injectable 40 milliGRAM(s) SubCutaneous every 24 hours  folic acid 1 milliGRAM(s) Oral daily  gabapentin 100 milliGRAM(s) Oral two times a day  lactated ringers. 500 milliLiter(s) (50 mL/Hr) IV Continuous <Continuous>  LORazepam     Tablet   Oral   LORazepam     Tablet 1.5 milliGRAM(s) Oral every 4 hours  multivitamin 1 Tablet(s) Oral daily  naltrexone 50 milliGRAM(s) Oral daily  nicotine -   7 mG/24Hr(s) Patch 1 Patch Transdermal daily  thiamine IVPB 500 milliGRAM(s) IV Intermittent three times a day  traZODone 50 milliGRAM(s) Oral at bedtime  venlafaxine XR. 150 milliGRAM(s) Oral daily    MEDICATIONS  (PRN):  acetaminophen     Tablet .. 650 milliGRAM(s) Oral every 6 hours PRN Temp greater or equal to 38C (100.4F), Mild Pain (1 - 3)  aluminum hydroxide/magnesium hydroxide/simethicone Suspension 30 milliLiter(s) Oral every 4 hours PRN Dyspepsia  LORazepam     Tablet 2 milliGRAM(s) Oral every 2 hours PRN CIWA-Ar score increase by 2 points and a total score of 7 or less  LORazepam     Tablet 2 milliGRAM(s) Oral every 1 hour PRN CIWA-Ar score 8 or greater  LORazepam     Tablet 2 milliGRAM(s) Oral every 1 hour PRN Symptom-triggered: each CIWA -Ar score 8 or GREATER  melatonin 3 milliGRAM(s) Oral at bedtime PRN Insomnia  nicotine  Polacrilex Lozenge 2 milliGRAM(s) Oral every 2 hours PRN Breakthrough cravings  ondansetron Injectable 4 milliGRAM(s) IV Push every 8 hours PRN Nausea and/or Vomiting      CAPILLARY BLOOD GLUCOSE        I&O's Summary    23 Sep 2023 07:01  -  24 Sep 2023 07:00  --------------------------------------------------------  IN: 650 mL / OUT: 2 mL / NET: 648 mL        PHYSICAL EXAM:  Vital Signs Last 24 Hrs  T(C): 36.8 (24 Sep 2023 00:05), Max: 37.2 (23 Sep 2023 09:43)  T(F): 98.3 (24 Sep 2023 00:05), Max: 98.9 (23 Sep 2023 09:43)  HR: 78 (24 Sep 2023 00:05) (71 - 85)  BP: 105/68 (24 Sep 2023 00:05) (102/70 - 125/92)  BP(mean): --  RR: 18 (24 Sep 2023 00:05) (18 - 18)  SpO2: 98% (24 Sep 2023 00:05) (98% - 100%)    Parameters below as of 24 Sep 2023 00:05  Patient On (Oxygen Delivery Method): room air        General: well-nourished; NAD  Skin: warm and dry, no rashes  Head: NC/AT  Eyes: PERRLA; EOM intact; conjunctiva clear  ENMT: MMM  Neck: Supple   Respiratory: no chest wall deformity, CTAB w/good aeration  Cardiovascular: RRR, S1/S2 normal; No m/r/g  Abdominal: Mild distention. Tender in LLQ, LUQ, L flank; tenderness + mcburneys point tenderness, + tenderness at murphies point  Extremities: full ROM, no tenderness, no edema  Neurological: A&Ox4, CNII-VII grossly intact, strength 5/5 in UE and LE, no asterixis, no tremor    LABS:                        11.3   5.56  )-----------( 252      ( 23 Sep 2023 07:24 )             34.0     09-23    137  |  103  |  5<L>  ----------------------------<  86  3.6   |  22  |  0.60    Ca    8.4      23 Sep 2023 07:11  Phos  3.0     09-23  Mg     1.9     09-23    TPro  6.2  /  Alb  3.8  /  TBili  0.6  /  DBili  0.2  /  AST  29  /  ALT  10  /  AlkPhos  80  09-23          Urinalysis Basic - ( 23 Sep 2023 07:11 )    Color: x / Appearance: x / SG: x / pH: x  Gluc: 86 mg/dL / Ketone: x  / Bili: x / Urobili: x   Blood: x / Protein: x / Nitrite: x   Leuk Esterase: x / RBC: x / WBC x   Sq Epi: x / Non Sq Epi: x / Bacteria: x        Culture - Urine (collected 22 Sep 2023 13:13)  Source: Clean Catch Clean Catch (Midstream)  Final Report (23 Sep 2023 14:17):    <10,000 CFU/mL Normal Urogenital Rebekah        RADIOLOGY & ADDITIONAL TESTS:  Results Reviewed:   Imaging Personally Reviewed:  Electrocardiogram Personally Reviewed:    COORDINATION OF CARE:  Care Discussed with Consultants/Other Providers [Y/N]:  Prior or Outpatient Records Reviewed [Y/N]:      ******************************  Authored By: Dayton Romano MD PGY2  Internal Medicine  Pager: [phone] [facility]; [id number] [facility]  MS Teams Preferred  ******************************

## 2023-09-24 NOTE — PROGRESS NOTE ADULT - PROBLEM SELECTOR PLAN 1
Pt w/ hx of chronic alcohol abuse. Drinks 12 beers per day, last drink approx 18 hours prior  - CIWA symptom triggered ativan PO protocol  - Ativan PO taper  - CIWAs 2-4 overnight  - cw thiamine 500 TID x5 days  - cw folate 1mg qd  - cw multivitamin   - SBIRT consult placed

## 2023-09-25 ENCOUNTER — TRANSCRIPTION ENCOUNTER (OUTPATIENT)
Age: 33
End: 2023-09-25

## 2023-09-25 VITALS
OXYGEN SATURATION: 96 % | DIASTOLIC BLOOD PRESSURE: 94 MMHG | HEART RATE: 100 BPM | TEMPERATURE: 98 F | SYSTOLIC BLOOD PRESSURE: 135 MMHG | RESPIRATION RATE: 18 BRPM

## 2023-09-25 LAB
ALBUMIN SERPL ELPH-MCNC: 4.5 G/DL — SIGNIFICANT CHANGE UP (ref 3.3–5)
ALP SERPL-CCNC: 84 U/L — SIGNIFICANT CHANGE UP (ref 40–120)
ALT FLD-CCNC: 15 U/L — SIGNIFICANT CHANGE UP (ref 10–45)
ANION GAP SERPL CALC-SCNC: 14 MMOL/L — SIGNIFICANT CHANGE UP (ref 5–17)
ANION GAP SERPL CALC-SCNC: 15 MMOL/L — SIGNIFICANT CHANGE UP (ref 5–17)
AST SERPL-CCNC: 22 U/L — SIGNIFICANT CHANGE UP (ref 10–40)
BASOPHILS # BLD AUTO: 0.02 K/UL — SIGNIFICANT CHANGE UP (ref 0–0.2)
BASOPHILS NFR BLD AUTO: 0.2 % — SIGNIFICANT CHANGE UP (ref 0–2)
BILIRUB SERPL-MCNC: 0.5 MG/DL — SIGNIFICANT CHANGE UP (ref 0.2–1.2)
BUN SERPL-MCNC: 6 MG/DL — LOW (ref 7–23)
BUN SERPL-MCNC: 6 MG/DL — LOW (ref 7–23)
CALCIUM SERPL-MCNC: 9.7 MG/DL — SIGNIFICANT CHANGE UP (ref 8.4–10.5)
CALCIUM SERPL-MCNC: 9.8 MG/DL — SIGNIFICANT CHANGE UP (ref 8.4–10.5)
CHLORIDE SERPL-SCNC: 100 MMOL/L — SIGNIFICANT CHANGE UP (ref 96–108)
CHLORIDE SERPL-SCNC: 101 MMOL/L — SIGNIFICANT CHANGE UP (ref 96–108)
CO2 SERPL-SCNC: 21 MMOL/L — LOW (ref 22–31)
CO2 SERPL-SCNC: 22 MMOL/L — SIGNIFICANT CHANGE UP (ref 22–31)
CREAT SERPL-MCNC: 0.57 MG/DL — SIGNIFICANT CHANGE UP (ref 0.5–1.3)
CREAT SERPL-MCNC: 0.61 MG/DL — SIGNIFICANT CHANGE UP (ref 0.5–1.3)
EGFR: 120 ML/MIN/1.73M2 — SIGNIFICANT CHANGE UP
EGFR: 122 ML/MIN/1.73M2 — SIGNIFICANT CHANGE UP
EOSINOPHIL # BLD AUTO: 0.48 K/UL — SIGNIFICANT CHANGE UP (ref 0–0.5)
EOSINOPHIL NFR BLD AUTO: 4.7 % — SIGNIFICANT CHANGE UP (ref 0–6)
GLUCOSE SERPL-MCNC: 89 MG/DL — SIGNIFICANT CHANGE UP (ref 70–99)
GLUCOSE SERPL-MCNC: 89 MG/DL — SIGNIFICANT CHANGE UP (ref 70–99)
HCT VFR BLD CALC: 37.7 % — SIGNIFICANT CHANGE UP (ref 34.5–45)
HGB BLD-MCNC: 12.6 G/DL — SIGNIFICANT CHANGE UP (ref 11.5–15.5)
IMM GRANULOCYTES NFR BLD AUTO: 0.5 % — SIGNIFICANT CHANGE UP (ref 0–0.9)
LYMPHOCYTES # BLD AUTO: 2.2 K/UL — SIGNIFICANT CHANGE UP (ref 1–3.3)
LYMPHOCYTES # BLD AUTO: 21.7 % — SIGNIFICANT CHANGE UP (ref 13–44)
MAGNESIUM SERPL-MCNC: 1.9 MG/DL — SIGNIFICANT CHANGE UP (ref 1.6–2.6)
MAGNESIUM SERPL-MCNC: 2 MG/DL — SIGNIFICANT CHANGE UP (ref 1.6–2.6)
MCHC RBC-ENTMCNC: 30.5 PG — SIGNIFICANT CHANGE UP (ref 27–34)
MCHC RBC-ENTMCNC: 33.4 GM/DL — SIGNIFICANT CHANGE UP (ref 32–36)
MCV RBC AUTO: 91.3 FL — SIGNIFICANT CHANGE UP (ref 80–100)
MONOCYTES # BLD AUTO: 0.51 K/UL — SIGNIFICANT CHANGE UP (ref 0–0.9)
MONOCYTES NFR BLD AUTO: 5 % — SIGNIFICANT CHANGE UP (ref 2–14)
NEUTROPHILS # BLD AUTO: 6.86 K/UL — SIGNIFICANT CHANGE UP (ref 1.8–7.4)
NEUTROPHILS NFR BLD AUTO: 67.9 % — SIGNIFICANT CHANGE UP (ref 43–77)
NRBC # BLD: 0 /100 WBCS — SIGNIFICANT CHANGE UP (ref 0–0)
PHOSPHATE SERPL-MCNC: 3.7 MG/DL — SIGNIFICANT CHANGE UP (ref 2.5–4.5)
PHOSPHATE SERPL-MCNC: 3.8 MG/DL — SIGNIFICANT CHANGE UP (ref 2.5–4.5)
PLATELET # BLD AUTO: 290 K/UL — SIGNIFICANT CHANGE UP (ref 150–400)
POTASSIUM SERPL-MCNC: 3.8 MMOL/L — SIGNIFICANT CHANGE UP (ref 3.5–5.3)
POTASSIUM SERPL-MCNC: 3.8 MMOL/L — SIGNIFICANT CHANGE UP (ref 3.5–5.3)
POTASSIUM SERPL-SCNC: 3.8 MMOL/L — SIGNIFICANT CHANGE UP (ref 3.5–5.3)
POTASSIUM SERPL-SCNC: 3.8 MMOL/L — SIGNIFICANT CHANGE UP (ref 3.5–5.3)
PROT SERPL-MCNC: 7.5 G/DL — SIGNIFICANT CHANGE UP (ref 6–8.3)
RBC # BLD: 4.13 M/UL — SIGNIFICANT CHANGE UP (ref 3.8–5.2)
RBC # FLD: 12.2 % — SIGNIFICANT CHANGE UP (ref 10.3–14.5)
SODIUM SERPL-SCNC: 136 MMOL/L — SIGNIFICANT CHANGE UP (ref 135–145)
SODIUM SERPL-SCNC: 137 MMOL/L — SIGNIFICANT CHANGE UP (ref 135–145)
WBC # BLD: 10.12 K/UL — SIGNIFICANT CHANGE UP (ref 3.8–10.5)
WBC # FLD AUTO: 10.12 K/UL — SIGNIFICANT CHANGE UP (ref 3.8–10.5)

## 2023-09-25 PROCEDURE — 83605 ASSAY OF LACTIC ACID: CPT

## 2023-09-25 PROCEDURE — 84702 CHORIONIC GONADOTROPIN TEST: CPT

## 2023-09-25 PROCEDURE — 76705 ECHO EXAM OF ABDOMEN: CPT

## 2023-09-25 PROCEDURE — 82435 ASSAY OF BLOOD CHLORIDE: CPT

## 2023-09-25 PROCEDURE — 85014 HEMATOCRIT: CPT

## 2023-09-25 PROCEDURE — 99285 EMERGENCY DEPT VISIT HI MDM: CPT | Mod: 25

## 2023-09-25 PROCEDURE — 82947 ASSAY GLUCOSE BLOOD QUANT: CPT

## 2023-09-25 PROCEDURE — 74177 CT ABD & PELVIS W/CONTRAST: CPT

## 2023-09-25 PROCEDURE — 80076 HEPATIC FUNCTION PANEL: CPT

## 2023-09-25 PROCEDURE — 84295 ASSAY OF SERUM SODIUM: CPT

## 2023-09-25 PROCEDURE — 82330 ASSAY OF CALCIUM: CPT

## 2023-09-25 PROCEDURE — 82803 BLOOD GASES ANY COMBINATION: CPT

## 2023-09-25 PROCEDURE — 36415 COLL VENOUS BLD VENIPUNCTURE: CPT

## 2023-09-25 PROCEDURE — 85018 HEMOGLOBIN: CPT

## 2023-09-25 PROCEDURE — 99239 HOSP IP/OBS DSCHRG MGMT >30: CPT | Mod: GC

## 2023-09-25 PROCEDURE — 83690 ASSAY OF LIPASE: CPT

## 2023-09-25 PROCEDURE — 84100 ASSAY OF PHOSPHORUS: CPT

## 2023-09-25 PROCEDURE — 82140 ASSAY OF AMMONIA: CPT

## 2023-09-25 PROCEDURE — 96375 TX/PRO/DX INJ NEW DRUG ADDON: CPT

## 2023-09-25 PROCEDURE — 81001 URINALYSIS AUTO W/SCOPE: CPT

## 2023-09-25 PROCEDURE — 96374 THER/PROPH/DIAG INJ IV PUSH: CPT

## 2023-09-25 PROCEDURE — 83735 ASSAY OF MAGNESIUM: CPT

## 2023-09-25 PROCEDURE — 84132 ASSAY OF SERUM POTASSIUM: CPT

## 2023-09-25 PROCEDURE — 80048 BASIC METABOLIC PNL TOTAL CA: CPT

## 2023-09-25 PROCEDURE — 85025 COMPLETE CBC W/AUTO DIFF WBC: CPT

## 2023-09-25 PROCEDURE — 82248 BILIRUBIN DIRECT: CPT

## 2023-09-25 PROCEDURE — 80053 COMPREHEN METABOLIC PANEL: CPT

## 2023-09-25 PROCEDURE — 80307 DRUG TEST PRSMV CHEM ANLYZR: CPT

## 2023-09-25 PROCEDURE — 87086 URINE CULTURE/COLONY COUNT: CPT

## 2023-09-25 RX ADMIN — Medication 105 MILLIGRAM(S): at 11:13

## 2023-09-25 RX ADMIN — Medication 150 MILLIGRAM(S): at 11:14

## 2023-09-25 RX ADMIN — Medication 105 MILLIGRAM(S): at 05:02

## 2023-09-25 RX ADMIN — Medication 3 MILLIGRAM(S): at 01:15

## 2023-09-25 RX ADMIN — Medication 2 MILLIGRAM(S): at 07:32

## 2023-09-25 RX ADMIN — Medication 1 MILLIGRAM(S): at 05:02

## 2023-09-25 RX ADMIN — Medication 2 MILLIGRAM(S): at 11:14

## 2023-09-25 RX ADMIN — Medication 1 MILLIGRAM(S): at 01:02

## 2023-09-25 RX ADMIN — GABAPENTIN 100 MILLIGRAM(S): 400 CAPSULE ORAL at 11:13

## 2023-09-25 RX ADMIN — Medication 1 MILLIGRAM(S): at 11:13

## 2023-09-25 RX ADMIN — GABAPENTIN 100 MILLIGRAM(S): 400 CAPSULE ORAL at 05:02

## 2023-09-25 RX ADMIN — Medication 1 TABLET(S): at 11:14

## 2023-09-25 RX ADMIN — NALTREXONE HYDROCHLORIDE 50 MILLIGRAM(S): 50 TABLET, FILM COATED ORAL at 11:14

## 2023-09-25 NOTE — PROGRESS NOTE ADULT - PROBLEM SELECTOR PLAN 2
P/w 2 weeks of abdominal pain with 1 week of progressively worsening nausea and vomiting. Pain worse on R side. Unable to tolerate PO diet at home. Notably 1 episode of hematochezia 1 month ago lasting one day  - F/u CTAP w/ con   - RUQ us w/ R echogenic lesion likely hemangioma, otherwise unremarkable   - C/w zofran 4mg IV qh8 PRN N/V; qtc 455 9/22 P/w 2 weeks of abdominal pain with 1 week of progressively worsening nausea and vomiting. Pain worse on R side. Unable to tolerate PO diet at home. Notably 1 episode of hematochezia 1 month ago lasting one day  - CTAP w/ con neg   - RUQ us w/ R echogenic lesion likely hemangioma, otherwise unremarkable   - C/w zofran 4mg IV qh8 PRN N/V; qtc 455 9/22

## 2023-09-25 NOTE — DISCHARGE NOTE NURSING/CASE MANAGEMENT/SOCIAL WORK - NSDCFUADDAPPT_GEN_ALL_CORE_FT
APPTS ARE READY TO BE MADE: [ ] YES    Best Family or Patient Contact (if needed):    Additional Information about above appointments (if needed): [phone]    1: PCP Eddie Armstrong  2: F/u w/ pts Psychiatist    Other comments or requests:

## 2023-09-25 NOTE — PROGRESS NOTE ADULT - PROBLEM SELECTOR PLAN 6
Pt w/ hx of daily tobacco use  - Start nicotine patch 7mG/24hr Pt w/ hx of daily tobacco use  - c/w nicotine patch 7mG/24hr

## 2023-09-25 NOTE — PROGRESS NOTE ADULT - PROBLEM SELECTOR PLAN 4
Pt w/ hx of anxiety  - c/w venlafaxine 150mg qd

## 2023-09-25 NOTE — PROGRESS NOTE ADULT - SUBJECTIVE AND OBJECTIVE BOX
***************************************************************  Eliecer Lynn, PGY2  Internal Medicine   TEAMS Preferred  ***************************************************************    GAMALIEL BOWMAN  33y  MRN: [id number]  09-22-23 (3d)    Patient is a 33y old  Female who presents with a chief complaint of Vomiting alcohol withdrawal (24 Sep 2023 07:18)      SUBJECTIVE / OVERNIGHT EVENTS:   No acute overnight events. Pt seen and examined at bedside. Denies fevers, chills, CP, SOB, Abdominal pain, N/V, Constipation, Diarrhea. Last BM     12 Point ROS negative with the exception of the above    MEDICATIONS  (STANDING):  enoxaparin Injectable 40 milliGRAM(s) SubCutaneous every 24 hours  folic acid 1 milliGRAM(s) Oral daily  gabapentin 100 milliGRAM(s) Oral two times a day  lactated ringers. 500 milliLiter(s) (50 mL/Hr) IV Continuous <Continuous>  LORazepam     Tablet 1 milliGRAM(s) Oral every 4 hours  LORazepam     Tablet 0.5 milliGRAM(s) Oral every 4 hours  LORazepam     Tablet   Oral   multivitamin 1 Tablet(s) Oral daily  naltrexone 50 milliGRAM(s) Oral daily  nicotine -   7 mG/24Hr(s) Patch 1 Patch Transdermal daily  thiamine IVPB 500 milliGRAM(s) IV Intermittent three times a day  traZODone 50 milliGRAM(s) Oral at bedtime  venlafaxine XR. 150 milliGRAM(s) Oral daily    MEDICATIONS  (PRN):  acetaminophen     Tablet .. 650 milliGRAM(s) Oral every 6 hours PRN Temp greater or equal to 38C (100.4F), Mild Pain (1 - 3)  aluminum hydroxide/magnesium hydroxide/simethicone Suspension 30 milliLiter(s) Oral every 4 hours PRN Dyspepsia  LORazepam     Tablet 2 milliGRAM(s) Oral every 2 hours PRN CIWA-Ar score increase by 2 points and a total score of 7 or less  LORazepam     Tablet 2 milliGRAM(s) Oral every 1 hour PRN CIWA-Ar score 8 or greater  LORazepam     Tablet 2 milliGRAM(s) Oral every 1 hour PRN Symptom-triggered: each CIWA -Ar score 8 or GREATER  melatonin 3 milliGRAM(s) Oral at bedtime PRN Insomnia  nicotine  Polacrilex Lozenge 2 milliGRAM(s) Oral every 2 hours PRN Breakthrough cravings  ondansetron Injectable 4 milliGRAM(s) IV Push every 8 hours PRN Nausea and/or Vomiting      OBJECTIVE:  Vital Signs Last 24 Hrs  T(C): 36.6 (25 Sep 2023 00:16), Max: 37.2 (24 Sep 2023 20:23)  T(F): 97.8 (25 Sep 2023 00:16), Max: 99 (24 Sep 2023 20:23)  HR: 89 (25 Sep 2023 00:16) (89 - 98)  BP: 138/77 (25 Sep 2023 00:16) (133/101 - 138/77)  BP(mean): --  RR: 18 (25 Sep 2023 00:16) (18 - 18)  SpO2: 97% (25 Sep 2023 00:16) (97% - 98%)    Parameters below as of 25 Sep 2023 00:16  Patient On (Oxygen Delivery Method): room air        I&O's Summary    23 Sep 2023 07:01  -  24 Sep 2023 07:00  --------------------------------------------------------  IN: 650 mL / OUT: 2 mL / NET: 648 mL        PHYSICAL EXAM:  GENERAL: Laying comfortably, NAD  HEENT: NCAT, PERRLA, EOMI, no scleral icterus, no LAD  NECK: No JVD, supple  LUNG: CTABL; No wheezes, crackles, or rhonchi  HEART: RRR; normal S1/S2; No murmurs, rubs, or gallops  ABDOMEN: +BS, soft, nontender, nondistended, no HSM; No rebound, guarding, or rigidity  EXTREMITIES:  No LE edema b/l, 2+ Peripheral Pulses, No clubbing or cyanosis  NEUROLOGY: AOx3, non-focal, strength 5/5 in all extremities, sensation intact  PSYCH: calm and cooperative  SKIN: No rashes or lesions    LABS:                        12.6   10.12 )-----------( 290      ( 25 Sep 2023 06:31 )             37.7     Auto Eosinophil # 0.48  / Auto Eosinophil % 4.7   / Auto Neutrophil # 6.86  / Auto Neutrophil % 67.9  / BANDS % x                            11.9   8.05  )-----------( 272      ( 24 Sep 2023 07:13 )             36.5     Auto Eosinophil # 0.59  / Auto Eosinophil % 7.3   / Auto Neutrophil # 4.83  / Auto Neutrophil % 60.1  / BANDS % x                            11.3   5.56  )-----------( 252      ( 23 Sep 2023 07:24 )             34.0     Auto Eosinophil # 0.47  / Auto Eosinophil % 8.5   / Auto Neutrophil # 2.95  / Auto Neutrophil % 53.0  / BANDS % x        09-24    138  |  103  |  5<L>  ----------------------------<  80  3.8   |  23  |  0.64  09-23    137  |  103  |  5<L>  ----------------------------<  86  3.6   |  22  |  0.60  09-22    136  |  101  |  7   ----------------------------<  99  4.0   |  20<L>  |  0.58    Ca    8.9      24 Sep 2023 07:07  Ca    8.4      23 Sep 2023 07:11  Ca    8.8      22 Sep 2023 10:24  Phos  3.7     09-24  Mg     1.9     09-24    TPro  6.6  /  Alb  4.0  /  TBili  0.4  /  DBili  x   /  AST  22  /  ALT  12  /  AlkPhos  77  09-24  TPro  6.2  /  Alb  3.8  /  TBili  0.6  /  DBili  0.2  /  AST  29  /  ALT  10  /  AlkPhos  80  09-23  TPro  7.2  /  Alb  4.4  /  TBili  0.7  /  DBili  0.2  /  AST  28  /  ALT  15  /  AlkPhos  87  09-22          Urinalysis Basic - ( 24 Sep 2023 07:07 )    Color: x / Appearance: x / SG: x / pH: x  Gluc: 80 mg/dL / Ketone: x  / Bili: x / Urobili: x   Blood: x / Protein: x / Nitrite: x   Leuk Esterase: x / RBC: x / WBC x   Sq Epi: x / Non Sq Epi: x / Bacteria: x          CAPILLARY BLOOD GLUCOSE            RADIOLOGY & ADDITIONAL TESTS:  CT Abdomen and Pelvis w/ IV Cont:   ACC: [id number] EXAM:  CT ABDOMEN AND PELVIS IC   ORDERED BY:  JOSE LUIS BOND     PROCEDURE DATE:  09/22/2023          INTERPRETATION:  CLINICAL INFORMATION: Abdominal pain.    COMPARISON: None.    CONTRAST/COMPLICATIONS:  IV Contrast: Omnipaque 350  90 cc administered   10 cc discarded  Oral Contrast: NONE  Complications: None reported at time of study completion    PROCEDURE:  CT of the Abdomen and Pelvis was performed.  Sagittal and coronal reformats were performed.    FINDINGS:  LOWER CHEST: Within normal limits.    LIVER: Within normal limits.  BILE DUCTS: Normal caliber.  GALLBLADDER: Within normal limits.  SPLEEN: Within normal limits.  PANCREAS: Within normal limits.  ADRENALS: Within normal limits.  KIDNEYS/URETERS: Within normallimits.    BLADDER: Within normal limits.  REPRODUCTIVE ORGANS: Uterine fibroids. Involuting left ovarian cyst.    BOWEL: No bowel obstruction. Appendix is normal.  PERITONEUM: Trace fluid in the pelvis.  VESSELS: Within normal limits.  RETROPERITONEUM/LYMPH NODES: No lymphadenopathy.  ABDOMINAL WALL: Tiny fat-containing umbilical hernia.  BONES: Within normal limits.    IMPRESSION:  No acute intra-abdominal or pelvic pathology.        --- End of Report ---           CHANTELLE ESCUDERO MD; Resident Radiologist  This document has been electronically signed.  LENA DIAZ MD; Attending Radiologist  This document has been electronically signed. Sep 23 2023  8:56AM (09-22-23 @ 18:14)     ***************************************************************  Eliecer Lynn, PGY2  Internal Medicine   TEAMS Preferred  ***************************************************************    GAMALIEL BOWMAN  33y  MRN: [id number]  09-22-23 (3d)    Patient is a 33y old  Female who presents with a chief complaint of Vomiting alcohol withdrawal (24 Sep 2023 07:18)      SUBJECTIVE / OVERNIGHT EVENTS:   No acute overnight events. CIWA 5 this am due to psychomotor agitation. Pt seen and examined at bedside. Denies fevers, chills, CP, SOB, N/V. Reports improved abdominal pain. States she is very anxious as she is worried about going to an outpatient treatment facility v rehab program. She is also agitated as she is craving cigarettes; declining increased nicotine patch dose. Patient became tearful that only one family member checked on her while she was here.     12 Point ROS negative with the exception of the above    MEDICATIONS  (STANDING):  enoxaparin Injectable 40 milliGRAM(s) SubCutaneous every 24 hours  folic acid 1 milliGRAM(s) Oral daily  gabapentin 100 milliGRAM(s) Oral two times a day  lactated ringers. 500 milliLiter(s) (50 mL/Hr) IV Continuous <Continuous>  LORazepam     Tablet 1 milliGRAM(s) Oral every 4 hours  LORazepam     Tablet 0.5 milliGRAM(s) Oral every 4 hours  LORazepam     Tablet   Oral   multivitamin 1 Tablet(s) Oral daily  naltrexone 50 milliGRAM(s) Oral daily  nicotine -   7 mG/24Hr(s) Patch 1 Patch Transdermal daily  thiamine IVPB 500 milliGRAM(s) IV Intermittent three times a day  traZODone 50 milliGRAM(s) Oral at bedtime  venlafaxine XR. 150 milliGRAM(s) Oral daily    MEDICATIONS  (PRN):  acetaminophen     Tablet .. 650 milliGRAM(s) Oral every 6 hours PRN Temp greater or equal to 38C (100.4F), Mild Pain (1 - 3)  aluminum hydroxide/magnesium hydroxide/simethicone Suspension 30 milliLiter(s) Oral every 4 hours PRN Dyspepsia  LORazepam     Tablet 2 milliGRAM(s) Oral every 2 hours PRN CIWA-Ar score increase by 2 points and a total score of 7 or less  LORazepam     Tablet 2 milliGRAM(s) Oral every 1 hour PRN CIWA-Ar score 8 or greater  LORazepam     Tablet 2 milliGRAM(s) Oral every 1 hour PRN Symptom-triggered: each CIWA -Ar score 8 or GREATER  melatonin 3 milliGRAM(s) Oral at bedtime PRN Insomnia  nicotine  Polacrilex Lozenge 2 milliGRAM(s) Oral every 2 hours PRN Breakthrough cravings  ondansetron Injectable 4 milliGRAM(s) IV Push every 8 hours PRN Nausea and/or Vomiting      OBJECTIVE:  Vital Signs Last 24 Hrs  T(C): 36.6 (25 Sep 2023 00:16), Max: 37.2 (24 Sep 2023 20:23)  T(F): 97.8 (25 Sep 2023 00:16), Max: 99 (24 Sep 2023 20:23)  HR: 89 (25 Sep 2023 00:16) (89 - 98)  BP: 138/77 (25 Sep 2023 00:16) (133/101 - 138/77)  BP(mean): --  RR: 18 (25 Sep 2023 00:16) (18 - 18)  SpO2: 97% (25 Sep 2023 00:16) (97% - 98%)    Parameters below as of 25 Sep 2023 00:16  Patient On (Oxygen Delivery Method): room air        I&O's Summary    23 Sep 2023 07:01  -  24 Sep 2023 07:00  --------------------------------------------------------  IN: 650 mL / OUT: 2 mL / NET: 648 mL        PHYSICAL EXAM:  GENERAL: Laying comfortably, NAD  HEENT: NCAT, PERRLA, EOMI, no scleral icterus, no LAD  LUNG: CTABL; No wheezes, crackles, or rhonchi  HEART: RRR; normal S1/S2; No murmurs, rubs, or gallops  ABDOMEN: +BS, soft, nontender, nondistended, no HSM; No rebound, guarding, or rigidity  EXTREMITIES:  No LE edema b/l, 2+ Peripheral Pulses, No clubbing or cyanosis  NEUROLOGY: AOx3, non-focal, strength 5/5 in all extremities, sensation intact  PSYCH: calm and cooperative  SKIN: No rashes or lesions    LABS:                        12.6   10.12 )-----------( 290      ( 25 Sep 2023 06:31 )             37.7     Auto Eosinophil # 0.48  / Auto Eosinophil % 4.7   / Auto Neutrophil # 6.86  / Auto Neutrophil % 67.9  / BANDS % x                            11.9   8.05  )-----------( 272      ( 24 Sep 2023 07:13 )             36.5     Auto Eosinophil # 0.59  / Auto Eosinophil % 7.3   / Auto Neutrophil # 4.83  / Auto Neutrophil % 60.1  / BANDS % x                            11.3   5.56  )-----------( 252      ( 23 Sep 2023 07:24 )             34.0     Auto Eosinophil # 0.47  / Auto Eosinophil % 8.5   / Auto Neutrophil # 2.95  / Auto Neutrophil % 53.0  / BANDS % x        09-24    138  |  103  |  5<L>  ----------------------------<  80  3.8   |  23  |  0.64  09-23    137  |  103  |  5<L>  ----------------------------<  86  3.6   |  22  |  0.60  09-22    136  |  101  |  7   ----------------------------<  99  4.0   |  20<L>  |  0.58    Ca    8.9      24 Sep 2023 07:07  Ca    8.4      23 Sep 2023 07:11  Ca    8.8      22 Sep 2023 10:24  Phos  3.7     09-24  Mg     1.9     09-24    TPro  6.6  /  Alb  4.0  /  TBili  0.4  /  DBili  x   /  AST  22  /  ALT  12  /  AlkPhos  77  09-24  TPro  6.2  /  Alb  3.8  /  TBili  0.6  /  DBili  0.2  /  AST  29  /  ALT  10  /  AlkPhos  80  09-23  TPro  7.2  /  Alb  4.4  /  TBili  0.7  /  DBili  0.2  /  AST  28  /  ALT  15  /  AlkPhos  87  09-22          Urinalysis Basic - ( 24 Sep 2023 07:07 )    Color: x / Appearance: x / SG: x / pH: x  Gluc: 80 mg/dL / Ketone: x  / Bili: x / Urobili: x   Blood: x / Protein: x / Nitrite: x   Leuk Esterase: x / RBC: x / WBC x   Sq Epi: x / Non Sq Epi: x / Bacteria: x          CAPILLARY BLOOD GLUCOSE            RADIOLOGY & ADDITIONAL TESTS:  CT Abdomen and Pelvis w/ IV Cont:   ACC: [id number] EXAM:  CT ABDOMEN AND PELVIS IC   ORDERED BY:  JOSE LUIS BOND     PROCEDURE DATE:  09/22/2023          INTERPRETATION:  CLINICAL INFORMATION: Abdominal pain.    COMPARISON: None.    CONTRAST/COMPLICATIONS:  IV Contrast: Omnipaque 350  90 cc administered   10 cc discarded  Oral Contrast: NONE  Complications: None reported at time of study completion    PROCEDURE:  CT of the Abdomen and Pelvis was performed.  Sagittal and coronal reformats were performed.    FINDINGS:  LOWER CHEST: Within normal limits.    LIVER: Within normal limits.  BILE DUCTS: Normal caliber.  GALLBLADDER: Within normal limits.  SPLEEN: Within normal limits.  PANCREAS: Within normal limits.  ADRENALS: Within normal limits.  KIDNEYS/URETERS: Within normallimits.    BLADDER: Within normal limits.  REPRODUCTIVE ORGANS: Uterine fibroids. Involuting left ovarian cyst.    BOWEL: No bowel obstruction. Appendix is normal.  PERITONEUM: Trace fluid in the pelvis.  VESSELS: Within normal limits.  RETROPERITONEUM/LYMPH NODES: No lymphadenopathy.  ABDOMINAL WALL: Tiny fat-containing umbilical hernia.  BONES: Within normal limits.    IMPRESSION:  No acute intra-abdominal or pelvic pathology.        --- End of Report ---           CHANTELLE ESCUDERO MD; Resident Radiologist  This document has been electronically signed.  LENA DIAZ MD; Attending Radiologist  This document has been electronically signed. Sep 23 2023  8:56AM (09-22-23 @ 18:14)

## 2023-09-25 NOTE — PROVIDER CONTACT NOTE (OTHER) - BACKGROUND
Admitted with Alcohol with abuse and withdrawals. PmHX with anxiety, depression and Presenting with vomiting.

## 2023-09-25 NOTE — DISCHARGE NOTE NURSING/CASE MANAGEMENT/SOCIAL WORK - NSDCPEFALRISK_GEN_ALL_CORE
For information on Fall & Injury Prevention, visit: [email or web address] OR  [email or web address] OR  https://www.cdc.gov/steadi/patient.html

## 2023-09-25 NOTE — PROGRESS NOTE ADULT - ATTENDING COMMENTS
33y Female pmh anxiety, depression, alcohol abuse a/w alcohol withdrawal.    Alcohol withdrawal    Urine THC +    - CIWA has been 0-1. score of 5 noted this AM but mostly from anxiety - patient is a smoker and feels that she just needs a cig. Gets dizziness w/ the patch   - Has good outpatient f/u with psych and outpatient alcohol rehab program    Stable for dc home, advised to f/u with psych and outpatient counselor. Patient is motivated to quit   Time spent on discharge plannin min

## 2023-09-25 NOTE — PROGRESS NOTE ADULT - PROBLEM SELECTOR PLAN 7
Diet: Clears; advance as tolerated   DVT: lovenox Diet: Clears; advance as tolerated   DVT: lovenox    Plan to discharge today

## 2023-09-25 NOTE — PROGRESS NOTE ADULT - PROBLEM SELECTOR PLAN 1
Pt w/ hx of chronic alcohol abuse. Drinks 12 beers per day, last drink approx 18 hours prior  - CIWA symptom triggered ativan PO protocol  - Ativan PO taper  - CIWAs 2-4 overnight  - cw thiamine 500 TID x5 days  - cw folate 1mg qd  - cw multivitamin   - SBIRT consult placed Pt w/ hx of chronic alcohol abuse. Drinks 12 beers per day, last drink approx 18 hours prior  - CIWA symptom triggered ativan PO protocol  - Ativan PO taper  - CIWAs 5 this am per RN, 1-2 on my exam.  - cw thiamine 500 TID x5 days  - cw folate 1mg qd  - cw multivitamin   - SBIRT consult placed

## 2023-09-25 NOTE — PROGRESS NOTE ADULT - PROBLEM SELECTOR PLAN 5
Pt w/ hx of neck pain, on gabapentin at home  - C/w home gabapentin 100mg BID

## 2023-09-25 NOTE — DISCHARGE NOTE NURSING/CASE MANAGEMENT/SOCIAL WORK - NSDCVIVACCINE_GEN_ALL_CORE_FT
influenza, injectable, quadrivalent, preservative free; 05-Jan-2017 16:32; Mary Byrd (RN); [employer]; 74Y32; IntraMuscular; Deltoid Left.; 0.5 milliLiter(s); VIS (VIS Published: 07-Aug-2015, VIS Presented: 05-Jan-2017);

## 2023-09-25 NOTE — DISCHARGE NOTE NURSING/CASE MANAGEMENT/SOCIAL WORK - PATIENT PORTAL LINK FT
You can access the FollowMyHealth Patient Portal offered by [facility] by registering at the following website: [email or web address]. By joining [employer]’s FollowMyHealth portal, you will also be able to view your health information using other applications (apps) compatible with our system.

## 2023-10-18 NOTE — ED PROVIDER NOTE - NSALCOHOLFREQ_GEN_A_CORE_SD
"HPI obtained from chart review: 66 yo M active smoker w/ COPD, CAD s/p CABG x1 in 2006, paroxysmal Afib s/p cardioversion 3/2022 (on Eliquis, Metoprolol, and Amiodarone), HFpEF (EF 65%; not currently taking diuretics), hiatal hernia with reflux, HTN on Hydralazine, CKD4 s/p kidney transplants in 1992 and 2005 on Tacrolimus and Prednisone, presented to the ED with his wife complaining about worsening shortness of breath/DAUGHERTY; infrequent, nonproductive cough; and severe, sharp L posterior heel pain that started a few days ago. Per the patient, he woke up last night "gasping for air," short of breath, and with substernal chest pain radiating to his back. The chest pain resolved with Omeprazole; denies chest pain at this time. The shortness of breath and infrequent, nonproductive cough typically occur at rest and are worse with walking. The severe, sharp L posterior heel pain is worse with movement and walking, and has not responded to leg elevation. Of note, he has a history of tenosynovitis. He noted significant, watery diarrhea associated with stomach pain all day yesterday that resolved with Peptobismol and water; denies diarrhea this morning. Denies fevers, chills, nausea, vomiting, constipation, dysuria, hematuria, palpitations, headache, or vision changes.      His presenting vital signs were a temperature of 98.3, pulse rate of 57-84 bpm, resp rate of 20-28, blood pressure ranging from 156-169/69-74, MAP of 104-107, and SpO2 % on RA. Troponin (0.081), Magnesium (nml), CBC (Hgb 8.2, platelets 127), CMP (CO2 19, BUN 33, Cr 4.1), and BNP (1,168) were ordered. CXR showed RLL consolidation. XR L ankle showed calcaneal enthesopathy. CTAP showed trace bilateral pleural effusions, bibasilar dependent pulmonary opacities, new prominence/thickening of the L iliac fossa transplant kidney's ureter. Started on CTX, AZT, and Lasix          " daily

## 2023-11-03 NOTE — H&P ADULT - PROBLEM SELECTOR PROBLEM 5
Forms completed and signed by provider.   Fax back to: [phone]  Confirmed via Right Fax that transmission was successful.       Placed in Misc file      Jessica Wick CMA  Adult Endocrinology   [facility]           Neck pain

## 2024-03-13 NOTE — ED ADULT NURSE NOTE - PT NEEDS ASSIST
SWING BED WEEKLY TEAM SHEET     Kareem Rey   3/13/2024 WEEK #     Care Management    Issues to be resolved before discharge: Increased strength/balance/mobility     Family Education: Patient/staff to update family     Discharge Plan: Home with  HC   Patient/Family Adjustment     Goals of previous week:   [] 1st team   [] met   [] partially met    [x] not met             Why goals were not met: Continued weakness     Skilled Level of Care Remains   [x] yes   [] no    Estimated length of stay: Insurance review due 3/15/24    Patient/Family Concerns/input: Patient hoping for an extension     Patient/Family  Signature _________________  3/13/2024       Care Management Signature:  Joelle Myers, RN    no

## 2024-05-06 NOTE — ED ADULT NURSE NOTE - BREATHING, MLM
Physical Therapy Visit    Visit Type: Daily Treatment Note  Visit: 6  Referring Provider: Radha Lomax MD  Medical Diagnosis (from order): M51.36 - DDD (degenerative disc disease), lumbar  M50.30 - DDD (degenerative disc disease), cervical     Diagnosis Precautions: Osteoporosis  Hx breast cancer  R ANASTASIYA - 2018   R TKA - Nov 2021  L ANASTASIYA - 2021  L TKA - April 2022  LATEX ALLERGY    Chart reviewed at time of initial evaluation (relevant co-morbidities, allergies, tests and medications listed):           SUBJECTIVE                                                                                                               5/6/24: Yesterday had to sit with the heating pad most of the day on the back of the neck and the right leg, not sure why. Today she feels better. She has been doing the bike but it bothers her that her left knee crunches all the time.     Evaluation:  Seen previously in therapy in 2022 for pain in left knee, right hip and lower back. Returning to therapy as she continues to have pain in the left knee, feels crunching in the knee worst in the morning. Feels more hunched over when she is walking. Also reports now having pain and arthritis in the neck. Has been having more pain in the back recently. She continues to be limited by shortness of breath without known cardiac or pulmonary cause. Sometimes she even gets fatigued and has to rest walking from her condo to the elevator. Denies any radiating pain in the legs. Has some numbness/tingling in hands which is worse in the morning and with repetitive movements. Saw rheumatologist recently and had new lumbar and cervical x-rays done. Uses walker occasionally for longer distances (such as going to art museum), limited with using it due to wrist pain. Patient has lost 22# but does not feel like it has helped, pain might be worse. Had PT in 2023 for balance issues, did not feel like it improved much.     Physical activity: exercise bike 30 minutes 3-4  days per week    Patient Goals: decreased pain.      OBJECTIVE                                                                                                                    Observation   Crepitus near patella with left knee with flexion/extension  Pain left cervical sidebend/rotation                    Treatment     Therapeutic Exercise  Performed to develop strength, increase endurance, and improve range of motion for increased independence and improved functional mobility with gait, posture, standing tolerance, and household chores    - Dash stretch 2 x 1 min ea  - Bilateral knee flexion on swiss ball 15x   - Supine cervical rotation 10x   - Pulleys flexion/abduction/book opener 10x ea     Not performed today:  - Cervical sidebending 10x 5\" ea  - Shoulder rolls 2 x 10   - Shoulder shrugs 10x  - Bridge 2 x 10  - Hip flexor/calf stretch on step 10x ea  - Standing hip abduction/extension 2 x 10 ea  - Supine clamshell vs ring 3 x 10   - Walking overground in clinic 2 x 100m         Manual Therapy   Performed in order to address range of motion deficits and tissue extensibility for improved mobility and increased independence with gait, posture, and tasks of daily living    - STM to bilateral upper trapezius and bilateral cervical paraspinals in supine  - STM to left quads/iliotibial band  - Grade III patellar mobilizations, emphasis medial/lateral     Skilled input: verbal instruction/cues, tactile instruction/cues and as detailed above    Writer verbally educated and received verbal consent for hand placement, positioning of patient, and techniques to be performed today from patient       ASSESSMENT                                                                                                            Today continued focusing on addressing soft tissue/ROM restrictions of the cervical spine and of the left knee to reduce crepitus sensation with knee flexion/extension when using stationary bike at home.  Initially reports some increased soreness with left knee ROM following patellar and soft tissue mobilization, however this improved after ROM on swiss ball and reports no significant pain with ambulation at end of session.   Education:   - Present and ready to learn: patient  - Results of above outlined education: Verbalizes understanding and Demonstrates understanding    PLAN                                                                                                                           Suggestions for next session as indicated: Cervical ROM/postural strengthening.  Continue walking program/gait training  Hip external rotation strengthening, progression of abd/ext strengthening.  Continue improving hip extension and reducing anterior muscle restrictions.  Progressive functional strengthening.  Monitor HR/SaO2/dyspnea during aerobic exercise.       Therapy procedure time and total treatment time can be found documented on the Time Entry flowsheet     Spontaneous, unlabored and symmetrical

## 2024-07-17 NOTE — H&P ADULT - REASON FOR ADMISSION
Pt needs refill on seizure medication. Please review and send, thanks    alcohol withdrawal Preparation Of Recipient Site - Flap Takedown: The eschar and granulation tissue was removed surgically with sharp dissection to facilitate appropriate healing after division and inset of the proximal and distal interpolation flap.

## 2024-09-06 NOTE — ED PROVIDER NOTE - MUSCULOSKELETAL, MLM
Detail Level: Detailed
Strength appropriate for age. Full active range of motion of all 4 extremities.

## 2024-10-15 NOTE — H&P ADULT - TIME-BASED
structure, function, activity limitation and / or participation in recreation  LOW Complexity : Stable, uncomplicated  AM-PAC  LOW      Based on the above components, the patient evaluation is determined to be of the following complexity level: Low               
74
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
## 2025-03-19 ENCOUNTER — EMERGENCY (EMERGENCY)
Facility: HOSPITAL | Age: 35
LOS: 1 days | Discharge: HOME | End: 2025-03-19
Attending: EMERGENCY MEDICINE
Payer: COMMERCIAL

## 2025-03-19 VITALS
HEIGHT: 67 IN | HEART RATE: 126 BPM | RESPIRATION RATE: 17 BRPM | DIASTOLIC BLOOD PRESSURE: 89 MMHG | WEIGHT: 154.98 LBS | TEMPERATURE: 98 F | SYSTOLIC BLOOD PRESSURE: 148 MMHG | OXYGEN SATURATION: 99 %

## 2025-03-19 VITALS
SYSTOLIC BLOOD PRESSURE: 140 MMHG | OXYGEN SATURATION: 100 % | RESPIRATION RATE: 18 BRPM | DIASTOLIC BLOOD PRESSURE: 71 MMHG | HEART RATE: 103 BPM | TEMPERATURE: 97 F

## 2025-03-19 LAB
ALBUMIN SERPL ELPH-MCNC: 3.9 G/DL — SIGNIFICANT CHANGE UP (ref 3.5–5)
ALP SERPL-CCNC: 102 U/L — SIGNIFICANT CHANGE UP (ref 40–120)
ALT FLD-CCNC: 20 U/L DA — SIGNIFICANT CHANGE UP (ref 10–60)
ANION GAP SERPL CALC-SCNC: 8 MMOL/L — SIGNIFICANT CHANGE UP (ref 5–17)
APPEARANCE UR: CLEAR — SIGNIFICANT CHANGE UP
AST SERPL-CCNC: 25 U/L — SIGNIFICANT CHANGE UP (ref 10–40)
BACTERIA # UR AUTO: ABNORMAL /HPF
BILIRUB SERPL-MCNC: 0.3 MG/DL — SIGNIFICANT CHANGE UP (ref 0.2–1.2)
BILIRUB UR-MCNC: NEGATIVE — SIGNIFICANT CHANGE UP
BUN SERPL-MCNC: 3 MG/DL — LOW (ref 7–18)
CALCIUM SERPL-MCNC: 9.3 MG/DL — SIGNIFICANT CHANGE UP (ref 8.4–10.5)
CHLORIDE SERPL-SCNC: 101 MMOL/L — SIGNIFICANT CHANGE UP (ref 96–108)
CO2 SERPL-SCNC: 27 MMOL/L — SIGNIFICANT CHANGE UP (ref 22–31)
COLOR SPEC: YELLOW — SIGNIFICANT CHANGE UP
CREAT SERPL-MCNC: 0.57 MG/DL — SIGNIFICANT CHANGE UP (ref 0.5–1.3)
DIFF PNL FLD: ABNORMAL
EGFR: 121 ML/MIN/1.73M2 — SIGNIFICANT CHANGE UP
EGFR: 121 ML/MIN/1.73M2 — SIGNIFICANT CHANGE UP
EPI CELLS # UR: PRESENT
GLUCOSE SERPL-MCNC: 120 MG/DL — HIGH (ref 70–99)
GLUCOSE UR QL: NEGATIVE MG/DL — SIGNIFICANT CHANGE UP
HCG SERPL-ACNC: <1 MIU/ML — SIGNIFICANT CHANGE UP
HCT VFR BLD CALC: 39.2 % — SIGNIFICANT CHANGE UP (ref 34.5–45)
HGB BLD-MCNC: 13 G/DL — SIGNIFICANT CHANGE UP (ref 11.5–15.5)
HIV 1 & 2 AB SERPL IA.RAPID: SIGNIFICANT CHANGE UP
KETONES UR-MCNC: NEGATIVE MG/DL — SIGNIFICANT CHANGE UP
LEUKOCYTE ESTERASE UR-ACNC: NEGATIVE — SIGNIFICANT CHANGE UP
MCHC RBC-ENTMCNC: 30.7 PG — SIGNIFICANT CHANGE UP (ref 27–34)
MCHC RBC-ENTMCNC: 33.2 G/DL — SIGNIFICANT CHANGE UP (ref 32–36)
MCV RBC AUTO: 92.7 FL — SIGNIFICANT CHANGE UP (ref 80–100)
NITRITE UR-MCNC: NEGATIVE — SIGNIFICANT CHANGE UP
NRBC BLD AUTO-RTO: 0 /100 WBCS — SIGNIFICANT CHANGE UP (ref 0–0)
PH UR: 5.5 — SIGNIFICANT CHANGE UP (ref 5–8)
PLATELET # BLD AUTO: 378 K/UL — SIGNIFICANT CHANGE UP (ref 150–400)
POTASSIUM SERPL-MCNC: 3.4 MMOL/L — LOW (ref 3.5–5.3)
POTASSIUM SERPL-SCNC: 3.4 MMOL/L — LOW (ref 3.5–5.3)
PROT SERPL-MCNC: 8.2 G/DL — SIGNIFICANT CHANGE UP (ref 6–8.3)
PROT UR-MCNC: NEGATIVE MG/DL — SIGNIFICANT CHANGE UP
RBC # BLD: 4.23 M/UL — SIGNIFICANT CHANGE UP (ref 3.8–5.2)
RBC # FLD: 12.6 % — SIGNIFICANT CHANGE UP (ref 10.3–14.5)
RBC CASTS # UR COMP ASSIST: 0 /HPF — SIGNIFICANT CHANGE UP (ref 0–4)
SODIUM SERPL-SCNC: 136 MMOL/L — SIGNIFICANT CHANGE UP (ref 135–145)
SP GR SPEC: 1.01 — SIGNIFICANT CHANGE UP (ref 1–1.03)
T PALLIDUM AB TITR SER: NEGATIVE — SIGNIFICANT CHANGE UP
UROBILINOGEN FLD QL: 0.2 MG/DL — SIGNIFICANT CHANGE UP (ref 0.2–1)
WBC # BLD: 12.81 K/UL — HIGH (ref 3.8–10.5)
WBC # FLD AUTO: 12.81 K/UL — HIGH (ref 3.8–10.5)
WBC UR QL: 4 /HPF — SIGNIFICANT CHANGE UP (ref 0–5)

## 2025-03-19 PROCEDURE — 80074 ACUTE HEPATITIS PANEL: CPT

## 2025-03-19 PROCEDURE — 87591 N.GONORRHOEAE DNA AMP PROB: CPT

## 2025-03-19 PROCEDURE — 87661 TRICHOMONAS VAGINALIS AMPLIF: CPT

## 2025-03-19 PROCEDURE — 96372 THER/PROPH/DIAG INJ SC/IM: CPT

## 2025-03-19 PROCEDURE — 99285 EMERGENCY DEPT VISIT HI MDM: CPT | Mod: 25

## 2025-03-19 PROCEDURE — 86703 HIV-1/HIV-2 1 RESULT ANTBDY: CPT

## 2025-03-19 PROCEDURE — 36415 COLL VENOUS BLD VENIPUNCTURE: CPT

## 2025-03-19 PROCEDURE — 81001 URINALYSIS AUTO W/SCOPE: CPT

## 2025-03-19 PROCEDURE — 99053 MED SERV 10PM-8AM 24 HR FAC: CPT

## 2025-03-19 PROCEDURE — 87491 CHLMYD TRACH DNA AMP PROBE: CPT

## 2025-03-19 PROCEDURE — 86780 TREPONEMA PALLIDUM: CPT

## 2025-03-19 PROCEDURE — 80053 COMPREHEN METABOLIC PANEL: CPT

## 2025-03-19 PROCEDURE — 85027 COMPLETE CBC AUTOMATED: CPT

## 2025-03-19 PROCEDURE — 84702 CHORIONIC GONADOTROPIN TEST: CPT

## 2025-03-19 PROCEDURE — 99284 EMERGENCY DEPT VISIT MOD MDM: CPT

## 2025-03-19 RX ORDER — ONDANSETRON HCL/PF 4 MG/2 ML
1 VIAL (ML) INJECTION
Qty: 1 | Refills: 0
Start: 2025-03-19 | End: 2025-03-23

## 2025-03-19 RX ORDER — ONDANSETRON HCL/PF 4 MG/2 ML
4 VIAL (ML) INJECTION ONCE
Refills: 0 | Status: DISCONTINUED | OUTPATIENT
Start: 2025-03-19 | End: 2025-03-19

## 2025-03-19 RX ORDER — DOXYCYCLINE HYCLATE 100 MG
100 TABLET ORAL ONCE
Refills: 0 | Status: COMPLETED | OUTPATIENT
Start: 2025-03-19 | End: 2025-03-19

## 2025-03-19 RX ORDER — CEFTRIAXONE 500 MG/1
500 INJECTION, POWDER, FOR SOLUTION INTRAMUSCULAR; INTRAVENOUS ONCE
Refills: 0 | Status: DISCONTINUED | OUTPATIENT
Start: 2025-03-19 | End: 2025-03-19

## 2025-03-19 RX ORDER — RALTEGRAVIR 400 MG/1
400 TABLET, FILM COATED ORAL ONCE
Refills: 0 | Status: COMPLETED | OUTPATIENT
Start: 2025-03-19 | End: 2025-03-19

## 2025-03-19 RX ORDER — DOXYCYCLINE HYCLATE 100 MG
1 TABLET ORAL
Qty: 28 | Refills: 0
Start: 2025-03-19 | End: 2025-04-01

## 2025-03-19 RX ORDER — METRONIDAZOLE 250 MG
500 TABLET ORAL ONCE
Refills: 0 | Status: COMPLETED | OUTPATIENT
Start: 2025-03-19 | End: 2025-03-19

## 2025-03-19 RX ORDER — ONDANSETRON HCL/PF 4 MG/2 ML
4 VIAL (ML) INJECTION ONCE
Refills: 0 | Status: COMPLETED | OUTPATIENT
Start: 2025-03-19 | End: 2025-03-19

## 2025-03-19 RX ORDER — RALTEGRAVIR 400 MG/1
1 TABLET, FILM COATED ORAL
Qty: 42 | Refills: 0
Start: 2025-03-19 | End: 2025-04-08

## 2025-03-19 RX ORDER — ULIPRISTAL ACETATE 30 MG/1
30 TABLET ORAL ONCE
Refills: 0 | Status: COMPLETED | OUTPATIENT
Start: 2025-03-19 | End: 2025-03-19

## 2025-03-19 RX ORDER — CEFTRIAXONE 500 MG/1
1000 INJECTION, POWDER, FOR SOLUTION INTRAMUSCULAR; INTRAVENOUS ONCE
Refills: 0 | Status: DISCONTINUED | OUTPATIENT
Start: 2025-03-19 | End: 2025-03-19

## 2025-03-19 RX ORDER — CEFTRIAXONE 500 MG/1
500 INJECTION, POWDER, FOR SOLUTION INTRAMUSCULAR; INTRAVENOUS ONCE
Refills: 0 | Status: COMPLETED | OUTPATIENT
Start: 2025-03-19 | End: 2025-03-19

## 2025-03-19 RX ADMIN — Medication 100 MILLIGRAM(S): at 10:46

## 2025-03-19 RX ADMIN — RALTEGRAVIR 400 MILLIGRAM(S): 400 TABLET, FILM COATED ORAL at 12:34

## 2025-03-19 RX ADMIN — ULIPRISTAL ACETATE 30 MILLIGRAM(S): 30 TABLET ORAL at 12:34

## 2025-03-19 RX ADMIN — CEFTRIAXONE 500 MILLIGRAM(S): 500 INJECTION, POWDER, FOR SOLUTION INTRAMUSCULAR; INTRAVENOUS at 12:33

## 2025-03-19 RX ADMIN — Medication 500 MILLIGRAM(S): at 09:46

## 2025-03-19 RX ADMIN — Medication 4 MILLIGRAM(S): at 10:46

## 2025-03-19 RX ADMIN — Medication 1 TABLET(S): at 12:33
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
## 2025-03-19 NOTE — ED PROVIDER NOTE - PATIENT PORTAL LINK FT
You can access the FollowMyHealth Patient Portal offered by [facility] by registering at the following website: [email or web address]. By joining [employer]’s FollowMyHealth portal, you will also be able to view your health information using other applications (apps) compatible with our system.
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
## 2025-03-19 NOTE — ED PROVIDER NOTE - PHYSICAL EXAMINATION
Exam: INCOMPLETE/REQUIRES EDITING  General: Patient tearful, tachycardic vital signs within normal limits  HEENT: airway patent with moist mucous membranes  Cardiac: RRR S1/S2 with strong peripheral pulses  Respiratory: lungs clear without respiratory distress  GI: abdomen soft, non tender, non distended  Neuro: no gross neurologic deficits  Skin: warm, well perfused  Psych: normal mood and affect
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
## 2025-03-20 LAB
C TRACH RRNA SPEC QL NAA+PROBE: SIGNIFICANT CHANGE UP
HAV IGM SER-ACNC: SIGNIFICANT CHANGE UP
HBV CORE IGM SER-ACNC: SIGNIFICANT CHANGE UP
HBV SURFACE AG SER-ACNC: SIGNIFICANT CHANGE UP
HCV AB S/CO SERPL IA: 0.16 S/CO — SIGNIFICANT CHANGE UP (ref 0–0.79)
HCV AB SERPL-IMP: SIGNIFICANT CHANGE UP
N GONORRHOEA RRNA SPEC QL NAA+PROBE: SIGNIFICANT CHANGE UP
SPECIMEN SOURCE: SIGNIFICANT CHANGE UP
SPECIMEN SOURCE: SIGNIFICANT CHANGE UP
T VAGINALIS RRNA SPEC QL NAA+PROBE: SIGNIFICANT CHANGE UP